# Patient Record
Sex: FEMALE | Race: OTHER | URBAN - METROPOLITAN AREA
[De-identification: names, ages, dates, MRNs, and addresses within clinical notes are randomized per-mention and may not be internally consistent; named-entity substitution may affect disease eponyms.]

---

## 2018-11-02 ENCOUNTER — EMERGENCY (EMERGENCY)
Facility: HOSPITAL | Age: 83
LOS: 1 days | Discharge: OTHER INSTITUTION | End: 2018-11-02
Attending: EMERGENCY MEDICINE
Payer: SELF-PAY

## 2018-11-02 ENCOUNTER — INPATIENT (INPATIENT)
Facility: HOSPITAL | Age: 83
LOS: 12 days | Discharge: HOME | DRG: 64 | End: 2018-11-15
Attending: PSYCHIATRY & NEUROLOGY | Admitting: PSYCHIATRY & NEUROLOGY
Payer: MEDICAID

## 2018-11-02 VITALS
WEIGHT: 135.58 LBS | TEMPERATURE: 98 F | RESPIRATION RATE: 18 BRPM | OXYGEN SATURATION: 100 % | SYSTOLIC BLOOD PRESSURE: 122 MMHG | DIASTOLIC BLOOD PRESSURE: 65 MMHG | HEART RATE: 70 BPM

## 2018-11-02 VITALS
RESPIRATION RATE: 16 BRPM | SYSTOLIC BLOOD PRESSURE: 125 MMHG | DIASTOLIC BLOOD PRESSURE: 55 MMHG | HEART RATE: 65 BPM | OXYGEN SATURATION: 97 %

## 2018-11-02 VITALS
OXYGEN SATURATION: 100 % | TEMPERATURE: 96 F | HEART RATE: 74 BPM | SYSTOLIC BLOOD PRESSURE: 147 MMHG | DIASTOLIC BLOOD PRESSURE: 76 MMHG

## 2018-11-02 DIAGNOSIS — I63.412 CEREBRAL INFARCTION DUE TO EMBOLISM OF LEFT MIDDLE CEREBRAL ARTERY: ICD-10-CM

## 2018-11-02 DIAGNOSIS — Z90.49 ACQUIRED ABSENCE OF OTHER SPECIFIED PARTS OF DIGESTIVE TRACT: Chronic | ICD-10-CM

## 2018-11-02 DIAGNOSIS — I63.9 CEREBRAL INFARCTION, UNSPECIFIED: ICD-10-CM

## 2018-11-02 LAB
ALBUMIN SERPL ELPH-MCNC: 3.8 G/DL — SIGNIFICANT CHANGE UP (ref 3.3–5)
ALBUMIN SERPL ELPH-MCNC: 4.1 G/DL — SIGNIFICANT CHANGE UP (ref 3.3–5.2)
ALP SERPL-CCNC: 87 U/L — SIGNIFICANT CHANGE UP (ref 40–120)
ALP SERPL-CCNC: 88 U/L — SIGNIFICANT CHANGE UP (ref 40–120)
ALT FLD-CCNC: 18 U/L — SIGNIFICANT CHANGE UP
ALT FLD-CCNC: 18 U/L — SIGNIFICANT CHANGE UP (ref 10–45)
ANION GAP SERPL CALC-SCNC: 10 MMOL/L — SIGNIFICANT CHANGE UP (ref 5–17)
ANION GAP SERPL CALC-SCNC: 13 MMOL/L — SIGNIFICANT CHANGE UP (ref 5–17)
APTT BLD: 19.5 SEC — LOW (ref 27.5–36.3)
APTT BLD: 25.7 SEC — LOW (ref 27.5–36.3)
AST SERPL-CCNC: 27 U/L — SIGNIFICANT CHANGE UP
AST SERPL-CCNC: 27 U/L — SIGNIFICANT CHANGE UP (ref 10–40)
BASOPHILS # BLD AUTO: 0 K/UL — SIGNIFICANT CHANGE UP (ref 0–0.2)
BASOPHILS # BLD AUTO: 0 K/UL — SIGNIFICANT CHANGE UP (ref 0–0.2)
BASOPHILS NFR BLD AUTO: 0.3 % — SIGNIFICANT CHANGE UP (ref 0–2)
BASOPHILS NFR BLD AUTO: 0.4 % — SIGNIFICANT CHANGE UP (ref 0–2)
BILIRUB SERPL-MCNC: 0.4 MG/DL — SIGNIFICANT CHANGE UP (ref 0.2–1.2)
BILIRUB SERPL-MCNC: 0.4 MG/DL — SIGNIFICANT CHANGE UP (ref 0.4–2)
BLD GP AB SCN SERPL QL: NEGATIVE — SIGNIFICANT CHANGE UP
BUN SERPL-MCNC: 26 MG/DL — HIGH (ref 7–23)
BUN SERPL-MCNC: 29 MG/DL — HIGH (ref 8–20)
CALCIUM SERPL-MCNC: 10.3 MG/DL — HIGH (ref 8.6–10.2)
CALCIUM SERPL-MCNC: 9.8 MG/DL — SIGNIFICANT CHANGE UP (ref 8.4–10.5)
CHLORIDE SERPL-SCNC: 104 MMOL/L — SIGNIFICANT CHANGE UP (ref 96–108)
CHLORIDE SERPL-SCNC: 107 MMOL/L — SIGNIFICANT CHANGE UP (ref 98–107)
CO2 SERPL-SCNC: 21 MMOL/L — LOW (ref 22–29)
CO2 SERPL-SCNC: 21 MMOL/L — LOW (ref 22–31)
CREAT SERPL-MCNC: 0.78 MG/DL — SIGNIFICANT CHANGE UP (ref 0.5–1.3)
CREAT SERPL-MCNC: 0.78 MG/DL — SIGNIFICANT CHANGE UP (ref 0.5–1.3)
EOSINOPHIL # BLD AUTO: 0 K/UL — SIGNIFICANT CHANGE UP (ref 0–0.5)
EOSINOPHIL # BLD AUTO: 0.1 K/UL — SIGNIFICANT CHANGE UP (ref 0–0.5)
EOSINOPHIL NFR BLD AUTO: 0.2 % — SIGNIFICANT CHANGE UP (ref 0–6)
EOSINOPHIL NFR BLD AUTO: 1.2 % — SIGNIFICANT CHANGE UP (ref 0–6)
GLUCOSE SERPL-MCNC: 103 MG/DL — HIGH (ref 70–99)
GLUCOSE SERPL-MCNC: 110 MG/DL — SIGNIFICANT CHANGE UP (ref 70–115)
HCT VFR BLD CALC: 35.2 % — SIGNIFICANT CHANGE UP (ref 34.5–45)
HCT VFR BLD CALC: 36 % — LOW (ref 37–47)
HGB BLD-MCNC: 11.7 G/DL — SIGNIFICANT CHANGE UP (ref 11.5–15.5)
HGB BLD-MCNC: 11.8 G/DL — LOW (ref 12–16)
INR BLD: 0.99 RATIO — SIGNIFICANT CHANGE UP (ref 0.88–1.16)
INR BLD: 1.02 RATIO — SIGNIFICANT CHANGE UP (ref 0.88–1.16)
LYMPHOCYTES # BLD AUTO: 1 K/UL — SIGNIFICANT CHANGE UP (ref 1–3.3)
LYMPHOCYTES # BLD AUTO: 3.1 K/UL — SIGNIFICANT CHANGE UP (ref 1–4.8)
LYMPHOCYTES # BLD AUTO: 43.4 % — SIGNIFICANT CHANGE UP (ref 20–55)
LYMPHOCYTES # BLD AUTO: 9.3 % — LOW (ref 13–44)
MCHC RBC-ENTMCNC: 32.2 PG — HIGH (ref 27–31)
MCHC RBC-ENTMCNC: 32.8 G/DL — SIGNIFICANT CHANGE UP (ref 32–36)
MCHC RBC-ENTMCNC: 32.9 PG — SIGNIFICANT CHANGE UP (ref 27–34)
MCHC RBC-ENTMCNC: 33.4 GM/DL — SIGNIFICANT CHANGE UP (ref 32–36)
MCV RBC AUTO: 98.4 FL — SIGNIFICANT CHANGE UP (ref 81–99)
MCV RBC AUTO: 98.6 FL — SIGNIFICANT CHANGE UP (ref 80–100)
MONOCYTES # BLD AUTO: 0.6 K/UL — SIGNIFICANT CHANGE UP (ref 0–0.9)
MONOCYTES # BLD AUTO: 0.7 K/UL — SIGNIFICANT CHANGE UP (ref 0–0.8)
MONOCYTES NFR BLD AUTO: 4.9 % — SIGNIFICANT CHANGE UP (ref 2–14)
MONOCYTES NFR BLD AUTO: 9.3 % — SIGNIFICANT CHANGE UP (ref 3–10)
NEUTROPHILS # BLD AUTO: 3.3 K/UL — SIGNIFICANT CHANGE UP (ref 1.8–8)
NEUTROPHILS # BLD AUTO: 9.6 K/UL — HIGH (ref 1.8–7.4)
NEUTROPHILS NFR BLD AUTO: 45.7 % — SIGNIFICANT CHANGE UP (ref 37–73)
NEUTROPHILS NFR BLD AUTO: 85.2 % — HIGH (ref 43–77)
PLATELET # BLD AUTO: 153 K/UL — SIGNIFICANT CHANGE UP (ref 150–400)
PLATELET # BLD AUTO: 191 K/UL — SIGNIFICANT CHANGE UP (ref 150–400)
POTASSIUM SERPL-MCNC: 4.2 MMOL/L — SIGNIFICANT CHANGE UP (ref 3.5–5.3)
POTASSIUM SERPL-MCNC: 4.5 MMOL/L — SIGNIFICANT CHANGE UP (ref 3.5–5.3)
POTASSIUM SERPL-SCNC: 4.2 MMOL/L — SIGNIFICANT CHANGE UP (ref 3.5–5.3)
POTASSIUM SERPL-SCNC: 4.5 MMOL/L — SIGNIFICANT CHANGE UP (ref 3.5–5.3)
PROT SERPL-MCNC: 6.4 G/DL — SIGNIFICANT CHANGE UP (ref 6–8.3)
PROT SERPL-MCNC: 6.7 G/DL — SIGNIFICANT CHANGE UP (ref 6.6–8.7)
PROTHROM AB SERPL-ACNC: 11.4 SEC — SIGNIFICANT CHANGE UP (ref 10–12.9)
PROTHROM AB SERPL-ACNC: 11.7 SEC — SIGNIFICANT CHANGE UP (ref 10–12.9)
RBC # BLD: 3.57 M/UL — LOW (ref 3.8–5.2)
RBC # BLD: 3.66 M/UL — LOW (ref 4.4–5.2)
RBC # FLD: 12.3 % — SIGNIFICANT CHANGE UP (ref 10.3–14.5)
RBC # FLD: 12.8 % — SIGNIFICANT CHANGE UP (ref 11–15.6)
RH IG SCN BLD-IMP: POSITIVE — SIGNIFICANT CHANGE UP
SODIUM SERPL-SCNC: 135 MMOL/L — SIGNIFICANT CHANGE UP (ref 135–145)
SODIUM SERPL-SCNC: 141 MMOL/L — SIGNIFICANT CHANGE UP (ref 135–145)
TROPONIN T SERPL-MCNC: <0.01 NG/ML — SIGNIFICANT CHANGE UP (ref 0–0.06)
WBC # BLD: 11.2 K/UL — HIGH (ref 3.8–10.5)
WBC # BLD: 7.2 K/UL — SIGNIFICANT CHANGE UP (ref 4.8–10.8)
WBC # FLD AUTO: 11.2 K/UL — HIGH (ref 3.8–10.5)
WBC # FLD AUTO: 7.2 K/UL — SIGNIFICANT CHANGE UP (ref 4.8–10.8)

## 2018-11-02 PROCEDURE — 93010 ELECTROCARDIOGRAM REPORT: CPT

## 2018-11-02 PROCEDURE — 70450 CT HEAD/BRAIN W/O DYE: CPT | Mod: 26,59,77

## 2018-11-02 PROCEDURE — T1013: CPT

## 2018-11-02 PROCEDURE — 71045 X-RAY EXAM CHEST 1 VIEW: CPT

## 2018-11-02 PROCEDURE — 93005 ELECTROCARDIOGRAM TRACING: CPT

## 2018-11-02 PROCEDURE — 85610 PROTHROMBIN TIME: CPT

## 2018-11-02 PROCEDURE — 71045 X-RAY EXAM CHEST 1 VIEW: CPT | Mod: 26

## 2018-11-02 PROCEDURE — 85027 COMPLETE CBC AUTOMATED: CPT

## 2018-11-02 PROCEDURE — 82962 GLUCOSE BLOOD TEST: CPT

## 2018-11-02 PROCEDURE — 99291 CRITICAL CARE FIRST HOUR: CPT | Mod: 25

## 2018-11-02 PROCEDURE — 84484 ASSAY OF TROPONIN QUANT: CPT

## 2018-11-02 PROCEDURE — 37195 THROMBOLYTIC THERAPY STROKE: CPT

## 2018-11-02 PROCEDURE — 70498 CT ANGIOGRAPHY NECK: CPT | Mod: 26

## 2018-11-02 PROCEDURE — 70450 CT HEAD/BRAIN W/O DYE: CPT | Mod: 26

## 2018-11-02 PROCEDURE — 99291 CRITICAL CARE FIRST HOUR: CPT

## 2018-11-02 PROCEDURE — 80053 COMPREHEN METABOLIC PANEL: CPT

## 2018-11-02 PROCEDURE — 36415 COLL VENOUS BLD VENIPUNCTURE: CPT

## 2018-11-02 PROCEDURE — 70496 CT ANGIOGRAPHY HEAD: CPT | Mod: 26

## 2018-11-02 PROCEDURE — 85730 THROMBOPLASTIN TIME PARTIAL: CPT

## 2018-11-02 PROCEDURE — 70450 CT HEAD/BRAIN W/O DYE: CPT

## 2018-11-02 PROCEDURE — 0042T: CPT

## 2018-11-02 RX ORDER — ALTEPLASE 100 MG
6 KIT INTRAVENOUS ONCE
Qty: 0 | Refills: 0 | Status: COMPLETED | OUTPATIENT
Start: 2018-11-02 | End: 2018-11-02

## 2018-11-02 RX ORDER — SODIUM CHLORIDE 9 MG/ML
1000 INJECTION INTRAMUSCULAR; INTRAVENOUS; SUBCUTANEOUS
Qty: 0 | Refills: 0 | Status: DISCONTINUED | OUTPATIENT
Start: 2018-11-02 | End: 2018-11-07

## 2018-11-02 RX ORDER — ALTEPLASE 100 MG
50 KIT INTRAVENOUS ONCE
Qty: 0 | Refills: 0 | Status: COMPLETED | OUTPATIENT
Start: 2018-11-02 | End: 2018-11-02

## 2018-11-02 RX ORDER — TELMISARTAN 20 MG/1
0 TABLET ORAL
Qty: 0 | Refills: 0 | COMMUNITY

## 2018-11-02 RX ORDER — SODIUM CHLORIDE 9 MG/ML
3 INJECTION INTRAMUSCULAR; INTRAVENOUS; SUBCUTANEOUS ONCE
Qty: 0 | Refills: 0 | Status: COMPLETED | OUTPATIENT
Start: 2018-11-02 | End: 2018-11-02

## 2018-11-02 RX ORDER — INFLUENZA VIRUS VACCINE 15; 15; 15; 15 UG/.5ML; UG/.5ML; UG/.5ML; UG/.5ML
0.5 SUSPENSION INTRAMUSCULAR ONCE
Qty: 0 | Refills: 0 | Status: DISCONTINUED | OUTPATIENT
Start: 2018-11-02 | End: 2018-11-15

## 2018-11-02 RX ADMIN — SODIUM CHLORIDE 3 MILLILITER(S): 9 INJECTION INTRAMUSCULAR; INTRAVENOUS; SUBCUTANEOUS at 18:48

## 2018-11-02 RX ADMIN — SODIUM CHLORIDE 125 MILLILITER(S): 9 INJECTION INTRAMUSCULAR; INTRAVENOUS; SUBCUTANEOUS at 19:02

## 2018-11-02 RX ADMIN — SODIUM CHLORIDE 75 MILLILITER(S): 9 INJECTION INTRAMUSCULAR; INTRAVENOUS; SUBCUTANEOUS at 23:10

## 2018-11-02 RX ADMIN — ALTEPLASE 360 MILLIGRAM(S): KIT at 18:59

## 2018-11-02 RX ADMIN — ALTEPLASE 6 MILLIGRAM(S): KIT at 19:00

## 2018-11-02 RX ADMIN — ALTEPLASE 50 MILLIGRAM(S): KIT at 19:01

## 2018-11-02 NOTE — ED PROVIDER NOTE - OBJECTIVE STATEMENT
87yo female pmh htn comes to ed last known normal 1745 when patient was drinking tea when she suddenly dropped cup and ? syncopal episode; as per ems, pt not responding and noted with weakness of rt upper extremity;   in ed; pt aphasic with left gax and paralysis or rt upper and lower extremity; code stroke called; blood glucose 131 by ems

## 2018-11-02 NOTE — ED ADULT NURSE NOTE - OBJECTIVE STATEMENT
86 y.o F presents to the ED via EMS as a transfer from [facility] s/p stroke. As per EMS, the patient was last seen normal at 1735 this evening; patient was brought to [facility] for aphasia and R facial droop and R sided weakness. As per EMS, TPA was initiated at [facility]- infusion started [id number] and infusion was completed at 1937. Patient transferred after infusion was complete; Patient arrived [id number] and Code Stroke called- Post TPA vitals resumed and patient sent directly to CT; labs re-drawn. Patient currently non-verbal and not answering questions or following commands. Patient still has R sided facial droop and right sided weakness. + perrl 3mm. Patient speaks Zimbabwean, Grandson, Parviz at the bedside to translate.

## 2018-11-02 NOTE — PHARMACOTHERAPY INTERVENTION NOTE - COMMENTS
Spoke to granddaughter at bedside. Pt is not on any anticoagulant or antiplatelets.
Code attended: Stroke  tPA administered

## 2018-11-02 NOTE — STROKE CODE NOTE - ASSESSMENT/PLAN
86 years old woman with vascular risk factors of age and hypertension is evaluated at OSH for acute onset of right-sided weakness and language disturbance, last known well time being 5:00-5:30 PM. Neurological examination shows decreased level of consciousness, right hemiplegia, right gaze palsy, right homonomous hemianopsia by blink to threat, global aphasia, and being mute. CT brain did not show any evidence of acute infarct or hemorrhage but was concerning for "dot sign" in the sylvian fissure.     Impression:  Cerebral embolism with cerebral infarction. Left MCA distribution stroke - likely etiology being embolic stroke from undetermined source versus large vessel disease

## 2018-11-02 NOTE — H&P ADULT - ASSESSMENT
87 yo right-handed Italian speaking woman who presents to [facility] as a transfer from [facility] for new and acute right-sided weakness and language disturbance (LWK 5-530pm, 11/2/18). ROS also revealed non-verbal, unresponsiveness. Otherwise limited due to patient's current cognitive state. Pertinent hx includes HTN and "heart condition" (unspecified w/ family at bedside). Patient's initial NIHSS score worsened to 28 upon arrival to [facility], was given tPA (1859pm, 11/2/18) and subsequently transferred for possible endovascular intervention. Patient's current NIHSS 22, pre-MRS 0, ASPECT > 6. 87 yo right-handed Thai speaking woman who presents to [facility] as a transfer from [facility] for new and acute right-sided weakness and language disturbance (LWK 5-530pm, 11/2/18). ROS also revealed non-verbal, unresponsiveness. Otherwise limited due to patient's current cognitive state. Pertinent hx includes HTN and "heart condition" (unspecified w/ family at bedside). Patient's initial NIHSS score worsened to 28 upon arrival to [facility], was given tPA (1859pm, 11/2/18) and subsequently transferred for possible endovascular intervention. Patient's current NIHSS 22, pre-MRS 0, no M1 occlusion.

## 2018-11-02 NOTE — ED ADULT NURSE NOTE - INTERVENTIONS DEFINITIONS
[place] to call system/Room bathroom lighting operational/Call bell, personal items and telephone within reach/Instruct patient to call for assistance

## 2018-11-02 NOTE — ED ADULT NURSE NOTE - OBJECTIVE STATEMENT
Pt BIBA nonverbal, s/p witnessed syncopal episode at home.  As per daughter, pt is normally a/ox3.  Pt presents only responsive to pain, less movement to right side.  Left sided gaze.  Unable to follow commmands.  Last seen normal at 1745 today.  Respirations even and unlabored.  Skin warm and dry.

## 2018-11-02 NOTE — ED PROVIDER NOTE - OBJECTIVE STATEMENT
87 y/o female hx of HTN presents as transfer from [facility] as stroke rescue. Bengali speaking 87 y/o female hx of HTN presents as transfer from [place] as stroke rescue. Wolof speaking patient w/ family at the bedside. Per granddaughter, patient was drinking tea at 5:30 pm when she dropped her tea out of R hand, became non verbal. NIH stroke scale initially 25 per documentation. Given TPA and driven here via EMS. Patient now moving her R leg, family states she was not doing that. Patient still non verbal w/ R sided neglect and L gaze preference. Mild increased movement of R hand. Not on any AC.

## 2018-11-02 NOTE — H&P ADULT - CRANIAL NERVE
left gaze preference not overcome w/ oculocephalics, PERRLA, right facial droop (mild), left blink reflex only,

## 2018-11-02 NOTE — ED PROVIDER NOTE - PROGRESS NOTE DETAILS
pt to have emergent ct angio of head for work up of stroke; overrriding need for renal function for test due to life threatening illness; ; pt to be transferred to [facility]; for further evaluation;

## 2018-11-02 NOTE — H&P ADULT - ATTENDING COMMENTS
I have seen and examined this patient with the stroke neurology team.     History was reviewed with the patient and/or available family members.   ROS: All negative except documented in the HPI.   Neurological exam was performed and agree with exam as documented above.   Laboratory results and imaging studies were reviewed by me.   I agree with the neurology resident note as documented above.    86 years old woman with multiple vascular risk factors, including age and HTN was transferred from [facility] to [facility] for acute onset of right-sided weakness. She was reported to be last seen to be normal, walking and talking, without any difficulties at around 5:00 to 5:30 PM. Soon after that. She was noted to have developed acute onset of right-sided weakness and language disturbance. She was initially evaluated at [facility] through tele-stroke and was treated with IV tPA. She was subsequently transferred to [facility] for further management. CT brain on arrival showed early changes of ischemia in the left MCA and CYNDEE distribution. CTA head and neck to my eye showed left MCA M3 (likely angular division) and left CYNDEE (A2/A3) pericallosal branch occlusions. CT perfusion showed cord infarct being 26 mL and minimally hypoperfused brain tissue being 47 mL. Neurological examination today shows a right gaze palsy, UMN type, right facial droop, right hemiplegia, global aphasia and being mute.     Impression:  Cerebral embolism with cerebral infarction. Left ICA (MCA+CYNDEE) distribution stroke - likely etiology being cryptogenic stroke, probably related to embolism from a proximal source like cardiac/paradoxical source of embolism    Plan:   - Case discussed with Dr. Barakat; based on discussion - risks for undergoing mechanical embolectomy are thought to outweigh potential benefits considering distal occlusions noted on CTA head and neck and CT perfusion findings thus patient was not deemed to be a candidate for mechanical embolectomy  - Continue with  24 hours post IV tPA precautions including BP goal<180/105 mmHg for first 24 hours followed by gradual normotension as per protocol  - MRI brain without contrast   - Aspirin and pharmacological DVT prophylaxis to be considered at 24 hours after the IV tPA and after repeating brain imaging, SCDs for DVT prophylaxis in the interim  - Atorvastatin 80 mg at bedtime after establishing enteral access or passing swallow evaluation  - TTE with bubble study and continue with telemetry to screen for possible cardiac source of embolism  - Prolonged cardiac monitoring with ICM to screen for occult cardiac arrythmia being the cardiac source of embolism, to be considered based on results of above mentioned tests   - HbA1C and LDL, continue with aggressive vascular risk factors modifications   - PT/OT/Speech and swallow evaluation   - Stroke education    Above mentioned plan was discussed with patient and available family member in detail. All the questions were answered and concerns were addressed. More than 86 minutes were spent in evaluation and management of this critically ill and unstable patient with acute stroke.

## 2018-11-02 NOTE — H&P ADULT - NSHPLABSRESULTS_GEN_ALL_CORE
Labs:    11-02    135  |  104  |  26<H>  ----------------------------<  103<H>  4.5   |  21<L>  |  0.78    Ca    9.8      02 Nov 2018 21:39    TPro  6.4  /  Alb  3.8  /  TBili  0.4  /  DBili  x   /  AST  27  /  ALT  18  /  AlkPhos  87  11-02    LIVER FUNCTIONS - ( 02 Nov 2018 21:39 )  Alb: 3.8 g/dL / Pro: 6.4 g/dL / ALK PHOS: 87 U/L / ALT: 18 U/L / AST: 27 U/L / GGT: x           CBC Full  -  ( 02 Nov 2018 21:39 )  WBC Count : 11.2 K/uL  Hemoglobin : 11.7 g/dL  Hematocrit : 35.2 %  Platelet Count - Automated : 153 K/uL  Mean Cell Volume : 98.6 fl  Mean Cell Hemoglobin : 32.9 pg  Mean Cell Hemoglobin Concentration : 33.4 gm/dL  Auto Neutrophil # : 9.6 K/uL  Auto Lymphocyte # : 1.0 K/uL  Auto Monocyte # : 0.6 K/uL  Auto Eosinophil # : 0.0 K/uL  Auto Basophil # : 0.0 K/uL  Auto Neutrophil % : 85.2 %  Auto Lymphocyte % : 9.3 %  Auto Monocyte % : 4.9 %  Auto Eosinophil % : 0.2 %  Auto Basophil % : 0.4 %    Radiology  < from: CT Head No Cont (11.02.18 @ 21:08) >    IMPRESSION:   Large infarcts in the left anterior and middle cerebral artery   distribution. No acute intracranial hemorrhage, midline shift or mass   effect.     Occlusion of the left internal carotid artery and/or thromboembolic   etiology suspected. Consider echocardiography and carotid Doppler for   further evaluation.    < end of copied text >

## 2018-11-02 NOTE — H&P ADULT - PROBLEM SELECTOR PLAN 1
right hemiparesis w/ global aphasia and right-side neglect 2/2 left multi territorial (distal CYNDEE, distal M2/3) acute ischemic infarcts of unknown etiology. Patient not candidate for endovascular at this time. s/p tPA 11/2/18 (1859)   Plan:   [x]CTH  [x]CTA H/N  [x]CT Perfusion  []MRI brain w/o contrast   []TTE   [x]tele  Meds: [hold 24hours s/p tpa]ASA/DVT prophylaxis [x]statin   Labs: []A1c []LDL []PT/INR   Consults: []SS/PT/OT   Recommendations  -NPO including meds, pending dysphagia screen   -Permissive /105   -hold all blood thinners for 24 hours s/p tPA  -repeat CTH s/p 24hrs tpa  -neuro checks     Med Rec: completed  Dispo:

## 2018-11-02 NOTE — ED PROVIDER NOTE - CRITICAL CARE PROVIDED
direct patient care (not related to procedure)/consultation with other physicians/consult w/ pt's family directly relating to pts condition/documentation

## 2018-11-02 NOTE — ED ADULT NURSE NOTE - NSIMPLEMENTINTERV_GEN_ALL_ED
Implemented All Fall with Harm Risk Interventions:  [place] to call system. Call bell, personal items and telephone within reach. Instruct patient to call for assistance. Room bathroom lighting operational. Non-slip footwear when patient is off stretcher. Physically safe environment: no spills, clutter or unnecessary equipment. Stretcher in lowest position, wheels locked, appropriate side rails in place. Provide visual cue, wrist band, yellow gown, etc. Monitor gait and stability. Monitor for mental status changes and reorient to person, place, and time. Review medications for side effects contributing to fall risk. Reinforce activity limits and safety measures with patient and family. Provide visual clues: red socks.

## 2018-11-02 NOTE — STROKE CODE NOTE - PROBLEM SELECTOR PLAN 1
Plan:   - Risks, benefits and adverse reactions associated with IV tPA including hemorrhagic stroke were discussed with patient and available family member in detail. After ruling out contraindications and obtaining verbal consent, patient would be treated with IV tPA in the ED  - Cont with IV tPA precautions including BP goal<185/110 mmHg before the bolus and <180/105 mmHg for first 24 hours after bolus followed by gradual normotension  - Transfer to [facility] for evaluation for consideration of neuro-interventional surgery   - Aspirin and pharmacological DVT prophylaxis to be considered 24 hours after the IV tPA and repeat brain imaging. SCDs for DVT prophylaxis in the interim   - Atorvastatin 80 mg after establishing enteral access or passing swallow evaluation  - TTE with bubble study and telemetry to screen for possible cardiac source of embolism  - LDL and HbA1C, continue with aggressive vascular risk factors modifications   - PT/OT/Speech and swallow evaluation     Above mentioned plan was discussed with patient, available family member and [facility] ED MD in detail. All the questions were answered and concerns were addressed.

## 2018-11-02 NOTE — ED PROVIDER NOTE - ATTENDING CONTRIBUTION TO CARE
87 y/o f with pmhx HTN, presents by EMS as a stroke transfer from [facility] for further care. patient had onset of symptoms at around 5:45 this evening, weakness noted while she was drinking and taken to hospital.  Per granddaughter, patient was drinking tea at 5:30 pm when she dropped her tea out of R hand, became non verbal. NIH stroke scale initially 25 per documentation. Given TPA at around 6-7 pm and driven here via EMS. Patient now moving her R leg, family states she was not doing that. Patient still non verbal w/ R sided neglect and L gaze preference. Mild increased movement of R hand. Not on any AC. Neurology at bedside upon patient arrival.   Gen. no acute distress   HEENT: pupils 2 mm b/l and reactive, left lateral gaze pref.   Lungs:  b/l bs  CVS: S1S2   Abd;  soft non tender  Ext: no edema no erythema  Neuro: non verbal. limited exam patient not following commands. speech not comprehensible  MSK: right side upper 0/5 right leg 2/5, left moving spon.

## 2018-11-02 NOTE — PATIENT PROFILE ADULT - STATED REASON FOR ADMISSION
pt was having dinner with family and became unresponsive , was take by ambulance to [facility]

## 2018-11-02 NOTE — ED ADULT NURSE REASSESSMENT NOTE - NS ED NURSE REASSESS COMMENT FT1
IV to left forearm appearing to be mildly infiltrated, cool to touch from normal saline running through.  IV stopped at this time.

## 2018-11-02 NOTE — ED PROVIDER NOTE - MEDICAL DECISION MAKING DETAILS
Connor Herrera (Resident): 87 y/o HTN w/ stroke rescue from OSH - mild increase in movement of her R leg. still no movement of R arm - will monitor BP, CT Head and neck, and admit to Stroke unit

## 2018-11-02 NOTE — STROKE CODE NOTE - SUBJECTIVE
86 years old woman was reported to be last normal at around 5:00-5:30 PM. Soon after that she sat down to drink tea. She developed acute onset of right sided weakness resulting in dropping of the cup with the right hand. She was also noted to have language disturbance as she was "unresponsive" to family members. She was brought to OSH for further evaluation.

## 2018-11-03 LAB
ANION GAP SERPL CALC-SCNC: 10 MMOL/L — SIGNIFICANT CHANGE UP (ref 5–17)
BASOPHILS # BLD AUTO: 0 K/UL — SIGNIFICANT CHANGE UP (ref 0–0.2)
BASOPHILS NFR BLD AUTO: 0.1 % — SIGNIFICANT CHANGE UP (ref 0–2)
BUN SERPL-MCNC: 22 MG/DL — SIGNIFICANT CHANGE UP (ref 7–23)
CALCIUM SERPL-MCNC: 9.7 MG/DL — SIGNIFICANT CHANGE UP (ref 8.4–10.5)
CHLORIDE SERPL-SCNC: 108 MMOL/L — SIGNIFICANT CHANGE UP (ref 96–108)
CHOLEST SERPL-MCNC: 169 MG/DL — SIGNIFICANT CHANGE UP (ref 10–199)
CO2 SERPL-SCNC: 22 MMOL/L — SIGNIFICANT CHANGE UP (ref 22–31)
CREAT SERPL-MCNC: 0.8 MG/DL — SIGNIFICANT CHANGE UP (ref 0.5–1.3)
EOSINOPHIL # BLD AUTO: 0 K/UL — SIGNIFICANT CHANGE UP (ref 0–0.5)
EOSINOPHIL NFR BLD AUTO: 0.3 % — SIGNIFICANT CHANGE UP (ref 0–6)
GLUCOSE SERPL-MCNC: 157 MG/DL — HIGH (ref 70–99)
HBA1C BLD-MCNC: 5.4 % — SIGNIFICANT CHANGE UP (ref 4–5.6)
HCT VFR BLD CALC: 33.3 % — LOW (ref 34.5–45)
HDLC SERPL-MCNC: 54 MG/DL — SIGNIFICANT CHANGE UP
HGB BLD-MCNC: 11.5 G/DL — SIGNIFICANT CHANGE UP (ref 11.5–15.5)
LIPID PNL WITH DIRECT LDL SERPL: 104 MG/DL — SIGNIFICANT CHANGE UP
LYMPHOCYTES # BLD AUTO: 1.2 K/UL — SIGNIFICANT CHANGE UP (ref 1–3.3)
LYMPHOCYTES # BLD AUTO: 14.6 % — SIGNIFICANT CHANGE UP (ref 13–44)
MCHC RBC-ENTMCNC: 33.7 PG — SIGNIFICANT CHANGE UP (ref 27–34)
MCHC RBC-ENTMCNC: 34.4 GM/DL — SIGNIFICANT CHANGE UP (ref 32–36)
MCV RBC AUTO: 97.8 FL — SIGNIFICANT CHANGE UP (ref 80–100)
MONOCYTES # BLD AUTO: 0.3 K/UL — SIGNIFICANT CHANGE UP (ref 0–0.9)
MONOCYTES NFR BLD AUTO: 4.1 % — SIGNIFICANT CHANGE UP (ref 2–14)
NEUTROPHILS # BLD AUTO: 6.8 K/UL — SIGNIFICANT CHANGE UP (ref 1.8–7.4)
NEUTROPHILS NFR BLD AUTO: 80.9 % — HIGH (ref 43–77)
PLATELET # BLD AUTO: 181 K/UL — SIGNIFICANT CHANGE UP (ref 150–400)
POTASSIUM SERPL-MCNC: 4.2 MMOL/L — SIGNIFICANT CHANGE UP (ref 3.5–5.3)
POTASSIUM SERPL-SCNC: 4.2 MMOL/L — SIGNIFICANT CHANGE UP (ref 3.5–5.3)
RBC # BLD: 3.4 M/UL — LOW (ref 3.8–5.2)
RBC # FLD: 12.2 % — SIGNIFICANT CHANGE UP (ref 10.3–14.5)
SODIUM SERPL-SCNC: 140 MMOL/L — SIGNIFICANT CHANGE UP (ref 135–145)
TOTAL CHOLESTEROL/HDL RATIO MEASUREMENT: 3.1 RATIO — LOW (ref 3.3–7.1)
TRIGL SERPL-MCNC: 57 MG/DL — SIGNIFICANT CHANGE UP (ref 10–149)
WBC # BLD: 8.4 K/UL — SIGNIFICANT CHANGE UP (ref 3.8–10.5)
WBC # FLD AUTO: 8.4 K/UL — SIGNIFICANT CHANGE UP (ref 3.8–10.5)

## 2018-11-03 PROCEDURE — 70551 MRI BRAIN STEM W/O DYE: CPT | Mod: 26

## 2018-11-03 PROCEDURE — 99291 CRITICAL CARE FIRST HOUR: CPT

## 2018-11-03 PROCEDURE — 70450 CT HEAD/BRAIN W/O DYE: CPT | Mod: 26

## 2018-11-03 NOTE — PROVIDER CONTACT NOTE (OTHER) - ASSESSMENT
pt globally aphasic, asleep. HR bradicardia low 40's (43 lowest) pt globally aphasic, asleep. HR bradycardia low 40's (43 lowest)

## 2018-11-04 DIAGNOSIS — R00.1 BRADYCARDIA, UNSPECIFIED: ICD-10-CM

## 2018-11-04 DIAGNOSIS — I63.9 CEREBRAL INFARCTION, UNSPECIFIED: ICD-10-CM

## 2018-11-04 LAB
ANION GAP SERPL CALC-SCNC: 10 MMOL/L — SIGNIFICANT CHANGE UP (ref 5–17)
BASOPHILS # BLD AUTO: 0 K/UL — SIGNIFICANT CHANGE UP (ref 0–0.2)
BASOPHILS NFR BLD AUTO: 0.6 % — SIGNIFICANT CHANGE UP (ref 0–2)
BUN SERPL-MCNC: 13 MG/DL — SIGNIFICANT CHANGE UP (ref 7–23)
CALCIUM SERPL-MCNC: 9.3 MG/DL — SIGNIFICANT CHANGE UP (ref 8.4–10.5)
CHLORIDE SERPL-SCNC: 113 MMOL/L — HIGH (ref 96–108)
CO2 SERPL-SCNC: 20 MMOL/L — LOW (ref 22–31)
CREAT SERPL-MCNC: 0.74 MG/DL — SIGNIFICANT CHANGE UP (ref 0.5–1.3)
EOSINOPHIL # BLD AUTO: 0.1 K/UL — SIGNIFICANT CHANGE UP (ref 0–0.5)
EOSINOPHIL NFR BLD AUTO: 1.4 % — SIGNIFICANT CHANGE UP (ref 0–6)
GLUCOSE SERPL-MCNC: 84 MG/DL — SIGNIFICANT CHANGE UP (ref 70–99)
HCT VFR BLD CALC: 32 % — LOW (ref 34.5–45)
HGB BLD-MCNC: 11.1 G/DL — LOW (ref 11.5–15.5)
LYMPHOCYTES # BLD AUTO: 1.4 K/UL — SIGNIFICANT CHANGE UP (ref 1–3.3)
LYMPHOCYTES # BLD AUTO: 26.6 % — SIGNIFICANT CHANGE UP (ref 13–44)
MCHC RBC-ENTMCNC: 34.4 PG — HIGH (ref 27–34)
MCHC RBC-ENTMCNC: 34.8 GM/DL — SIGNIFICANT CHANGE UP (ref 32–36)
MCV RBC AUTO: 98.9 FL — SIGNIFICANT CHANGE UP (ref 80–100)
MONOCYTES # BLD AUTO: 0.5 K/UL — SIGNIFICANT CHANGE UP (ref 0–0.9)
MONOCYTES NFR BLD AUTO: 10.3 % — SIGNIFICANT CHANGE UP (ref 2–14)
NEUTROPHILS # BLD AUTO: 3.3 K/UL — SIGNIFICANT CHANGE UP (ref 1.8–7.4)
NEUTROPHILS NFR BLD AUTO: 61.1 % — SIGNIFICANT CHANGE UP (ref 43–77)
PLATELET # BLD AUTO: 162 K/UL — SIGNIFICANT CHANGE UP (ref 150–400)
POTASSIUM SERPL-MCNC: 3.7 MMOL/L — SIGNIFICANT CHANGE UP (ref 3.5–5.3)
POTASSIUM SERPL-SCNC: 3.7 MMOL/L — SIGNIFICANT CHANGE UP (ref 3.5–5.3)
RBC # BLD: 3.24 M/UL — LOW (ref 3.8–5.2)
RBC # FLD: 11.9 % — SIGNIFICANT CHANGE UP (ref 10.3–14.5)
SODIUM SERPL-SCNC: 143 MMOL/L — SIGNIFICANT CHANGE UP (ref 135–145)
WBC # BLD: 5.3 K/UL — SIGNIFICANT CHANGE UP (ref 3.8–10.5)
WBC # FLD AUTO: 5.3 K/UL — SIGNIFICANT CHANGE UP (ref 3.8–10.5)

## 2018-11-04 PROCEDURE — 99233 SBSQ HOSP IP/OBS HIGH 50: CPT

## 2018-11-04 RX ORDER — ENOXAPARIN SODIUM 100 MG/ML
40 INJECTION SUBCUTANEOUS DAILY
Qty: 0 | Refills: 0 | Status: DISCONTINUED | OUTPATIENT
Start: 2018-11-04 | End: 2018-11-15

## 2018-11-04 RX ORDER — ASPIRIN/CALCIUM CARB/MAGNESIUM 324 MG
300 TABLET ORAL DAILY
Qty: 0 | Refills: 0 | Status: DISCONTINUED | OUTPATIENT
Start: 2018-11-04 | End: 2018-11-05

## 2018-11-04 RX ADMIN — Medication 300 MILLIGRAM(S): at 11:15

## 2018-11-04 RX ADMIN — SODIUM CHLORIDE 75 MILLILITER(S): 9 INJECTION INTRAMUSCULAR; INTRAVENOUS; SUBCUTANEOUS at 13:03

## 2018-11-04 RX ADMIN — ENOXAPARIN SODIUM 40 MILLIGRAM(S): 100 INJECTION SUBCUTANEOUS at 11:15

## 2018-11-04 NOTE — PHYSICAL THERAPY INITIAL EVALUATION ADULT - ADDITIONAL COMMENTS
Per chart, pt lives in 2 story home with grandkids. Prior to admission, pt was independent without device.

## 2018-11-04 NOTE — SWALLOW BEDSIDE ASSESSMENT ADULT - SLP PERTINENT HISTORY OF CURRENT PROBLEM
85 yo right-handed Upper sorbian speaking woman who presents to [facility] as a transfer from [facility] for new and acute right-sided weakness and language disturbance (LWK 5-530pm, 11/2/18). ROS also revealed non-verbal, unresponsiveness. Otherwise limited due to patient's current cognitive state. Pertinent hx includes HTN and "heart condition" (unspecified w/ family at bedside). Patient's initial NIHSS score worsened to 28 upon arrival to [facility], was given tPA (1859pm, 11/2/18) and subsequently transferred for possible endovascular intervention. Patient's current NIHSS 22, pre-MRS 0, no M1 occlusion.

## 2018-11-04 NOTE — PHYSICAL THERAPY INITIAL EVALUATION ADULT - IMPAIRMENTS CONTRIBUTING IMPAIRED BED MOBILITY, REHAB EVAL
impaired postural control/impaired coordination/decreased flexibility/abnormal muscle tone/impaired balance/impaired motor control/decreased strength

## 2018-11-04 NOTE — SWALLOW BEDSIDE ASSESSMENT ADULT - ASR SWALLOW DENTITION
lower dentures/upper dentures lower dentures/upper dentures/moving in oral cavity->per family pt uses adhesive. Advised family to bring in adhesive or remove dentures for comfort

## 2018-11-04 NOTE — SWALLOW BEDSIDE ASSESSMENT ADULT - COMMENTS
NEUROLOGICAL EXAM: Mental status: Awake, alert, mute, globally aphasic, unable to follow commands, unable to mimic Cranial Nerves: right facial droop,  right gaze palsy, Motor exam: Right side hemiplegia RUE flaccid 0/5, RLE increased tone 0/5, triple flexion, LUE 5/ Per attending statement: he was initially evaluated at [facility] through tele-stroke and was treated with IV tPA. She was subsequently transferred to [facility] for further management. CT brain on arrival showed early changes of ischemia in the left MCA and CYNDEE distribution. CTA head and neck to my eye showed left MCA M3 (likely angular division) and left CYNDEE (A2/A3) pericallosal branch occlusions. CT perfusion showed cord infarct being 26 mL and minimally hypoperfused brain tissue being 47 mL. Case discussed with Dr. Barakat; based on discussion - risks for undergoing mechanical embolectomy are thought to outweigh potential benefits considering distal occlusions noted on CTA head and neck and CT perfusion findings thus patient was not deemed to be a candidate for mechanical embolectomy. Impression: Cerebral embolism with cerebral infarction. Left ICA (MCA+CYNDEE) distribution stroke - likely etiology being cryptogenic stroke, probably related to embolism from a proximal source like cardiac/paradoxical source of embolism. Cards on board. Failed dysphagia screen Mental status: Awake, alert, mute, globally aphasic, unable to follow commands, unable to mimic Cranial Nerves: right facial droop,  right gaze palsy, Motor exam: Right side hemiplegia Per attending statement: she was initially evaluated at [facility] through tele-stroke and was treated with IV tPA. She was subsequently transferred to [facility] for further management. CT brain on arrival showed early changes of ischemia in the left MCA and CYNDEE distribution. CTA head and neck to my eye showed left MCA M3 (likely angular division) and left CYNDEE (A2/A3) pericallosal branch occlusions. CT perfusion showed cord infarct being 26 mL and minimally hypoperfused brain tissue being 47 mL. Case discussed with Dr. Barakat; based on discussion risks for undergoing mechanical embolectomy are thought to outweigh potential benefits considering distal occlusions noted on CTA head and neck and CT perfusion findings thus patient was not deemed to be a candidate for mechanical embolectomy. Impression: Cerebral embolism with cerebral infarction. Left ICA (MCA+CYNDEE) distribution stroke, likely etiology being cryptogenic stroke, probably related to embolism from a proximal source like cardiac/paradoxical source of embolism. Cards on board. Failed dysphagia screen. 11/2 CT: Large infarcts in the left anterior and middle cerebral artery distribution. No acute intracranial hemorrhage, midline shift or mass effect. Occlusion of the left internal carotid artery and/or thromboembolic etiology suspected. Consider echocardiography and carotid Doppler for further evaluation. 11/3 Prelim MRI: eft CYNDEE and left MCA territory infarcts with small amount of cortical hemorrhage in the left MCA infarct. CT:Atrophy. 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 CTA demonstrates decreased number of left middle cerebral artery branches in the sylvian fissure compared with the right consistent with a distal branch occlusion. There is a left distal A2 occlusion.

## 2018-11-04 NOTE — SWALLOW BEDSIDE ASSESSMENT ADULT - SLP GENERAL OBSERVATIONS
Pt supine in bed, non-verbal, reportedly following commands; however, independently able to follow 1/10 commands ("close your eyes") relayed in [place]ic on my assessment. Pt supine in bed, Czech speaking, family preferred to serve as  for assessment, non-verbal and not attempting to communicate, reportedly following commands; however, independently able to follow 1/10 commands ("close your eyes") relayed in Czech on my assessment. Unable to assess V1-V3.

## 2018-11-04 NOTE — CONSULT NOTE ADULT - SUBJECTIVE AND OBJECTIVE BOX
CHIEF COMPLAINT: CVA / Bradycardia      HISTORY OF PRESENT ILLNESS:  87 yo right-handed Slovenian speaking woman who presents to [facility] as a transfer from [facility] for new and acute right-sided weakness and language disturbance (LWK 5-530pm, 11/2/18). ROS also revealed non-verbal, unresponsiveness. Otherwise limited due to patient's current cognitive state. Pertinent hx includes HTN and "heart condition" (unspecified w/ family at bedside). Patient's initial NIHSS score worsened to 28 upon arrival to [facility], was given tPA (1859pm, 11/2/18) and subsequently transferred for possible endovascular intervention. Patient's current NIHSS 22, pre-MRS 0, no M1 occlusion. (02 Nov 2018 22:01)      PAST MEDICAL & SURGICAL HISTORY:  HTN (hypertension)  S/P appendectomy          MEDICATIONS:              influenza   Vaccine 0.5 milliLiter(s) IntraMuscular once  sodium chloride 0.9%. 1000 milliLiter(s) IV Continuous <Continuous>      FAMILY HISTORY:  No pertinent family history in first degree relatives      SOCIAL HISTORY:    [ ] Non-smoker  [ ] Smoker  [ ] Alcohol    Allergies    No Known Allergies    Intolerances    	    REVIEW OF SYSTEMS:  CONSTITUTIONAL: No fever, weight loss, + fatigue  EYES: No eye pain, visual disturbances, or discharge  ENMT:  No difficulty hearing, tinnitus, vertigo; No sinus or throat pain  NECK: No pain or stiffness  RESPIRATORY: No cough, wheezing, chills or hemoptysis; No Shortness of Breath  CARDIOVASCULAR: No chest pain, palpitations, passing out, dizziness, or leg swelling  GASTROINTESTINAL: No abdominal or epigastric pain. No nausea, vomiting, or hematemesis; No diarrhea or constipation. No melena or hematochezia.  GENITOURINARY: No dysuria, frequency, hematuria, or incontinence  NEUROLOGICAL: + headaches, memory loss, loss of strength, numbness, or tremors  SKIN: No itching, burning, rashes, or lesions   LYMPH Nodes: No enlarged glands  ENDOCRINE: No heat or cold intolerance; No hair loss  MUSCULOSKELETAL: + joint pain or swelling; No muscle, back, or extremity pain  PSYCHIATRIC: No depression, anxiety, mood swings, or difficulty sleeping  HEME/LYMPH: No easy bruising, or bleeding gums  ALLERY AND IMMUNOLOGIC: No hives or eczema	    [ ] All others negative	  [ ] Unable to obtain    PHYSICAL EXAM:  T(C): 36.8 (11-04-18 @ 08:00), Max: 37.8 (11-03-18 @ 22:00)  HR: 59 (11-04-18 @ 10:00) (53 - 63)  BP: 142/54 (11-04-18 @ 10:00) (124/65 - 153/54)  RR: 15 (11-04-18 @ 10:00) (14 - 18)  SpO2: 100% (11-04-18 @ 10:00) (97% - 100%)  Wt(kg): --  I&O's Summary    03 Nov 2018 08:01  -  04 Nov 2018 07:00  --------------------------------------------------------  IN: 1762.5 mL / OUT: 1525 mL / NET: 237.5 mL    04 Nov 2018 07:01  -  04 Nov 2018 11:06  --------------------------------------------------------  IN: 300 mL / OUT: 0 mL / NET: 300 mL        Appearance: NAD aphasic   HEENT:   dry oral mucosa, PERRL, EOMI	  Lymphatic: No lymphadenopathy  Cardiovascular: Normal S1 S2, No JVD, No murmurs, No edema  Respiratory: Lungs clear to auscultation	  Psychiatry: A & O x 0, Mood & affect appropriate  Gastrointestinal:  Soft, Non-tender, + BS	  Skin: No rashes, No ecchymoses, No cyanosis	  Neurologic: aphasic   Extremities: decreased range of motion, No clubbing, cyanosis or edema  Vascular: Peripheral pulses palpable 2+ bilaterally    TELEMETRY: 	  Sinus bradycardia   ECG:  Sinus bradycardia, no acute ischemic stt changes 	  RADIOLOGY: Sinus bradycardia   OTHER: 	  	  LABS:	 	    CARDIAC MARKERS:                                  11.1   5.3   )-----------( 162      ( 04 Nov 2018 03:51 )             32.0     11-04    143  |  113<H>  |  13  ----------------------------<  84  3.7   |  20<L>  |  0.74    Ca    9.3      04 Nov 2018 03:51    TPro  6.4  /  Alb  3.8  /  TBili  0.4  /  DBili  x   /  AST  27  /  ALT  18  /  AlkPhos  87  11-02    proBNP:   Lipid Profile:   HgA1c:   TSH:

## 2018-11-04 NOTE — SWALLOW BEDSIDE ASSESSMENT ADULT - ASR SWALLOW RECOMMEND DIAG
Ordered and scheduled for 11am 11/5 per family request./VFSS/MBS Ordered and scheduled for 11am on 11/5./SS/MBS

## 2018-11-04 NOTE — PROGRESS NOTE ADULT - SUBJECTIVE AND OBJECTIVE BOX
THE PATIENT WAS SEEN AND EXAMINED BY ME WITH THE HOUSESTAFF AND STROKE TEAM DURING MORNING ROUNDS.   HPI:  85 yo right-handed Australian speaking woman who presents to [facility] as a transfer from [facility] for new and acute right-sided weakness and language disturbance (LWK 5-530pm, 11/2/18). ROS also revealed non-verbal, unresponsiveness. Otherwise limited due to patient's current cognitive state. Pertinent hx includes HTN and "heart condition" (unspecified w/ family at bedside). Patient's initial NIHSS score worsened to 28 upon arrival to [facility], was given tPA (1859pm, 11/2/18) and subsequently transferred for possible endovascular intervention. Patient's current NIHSS 22, pre-MRS 0, no M1 occlusion. (02 Nov 2018 22:01)      SUBJECTIVE: No events overnight.  No new neurologic complaints.      influenza   Vaccine 0.5 milliLiter(s) IntraMuscular once  sodium chloride 0.9%. 1000 milliLiter(s) IV Continuous <Continuous>      PHYSICAL EXAM:   Vital Signs Last 24 Hrs  T(C): 36.8 (04 Nov 2018 08:00), Max: 37.8 (03 Nov 2018 22:00)  T(F): 98.2 (04 Nov 2018 08:00), Max: 100 (03 Nov 2018 22:00)  HR: 59 (04 Nov 2018 10:00) (53 - 63)  BP: 142/54 (04 Nov 2018 10:00) (124/65 - 153/54)  BP(mean): 78 (04 Nov 2018 10:00) (78 - 98)  RR: 15 (04 Nov 2018 10:00) (14 - 18)  SpO2: 100% (04 Nov 2018 10:00) (97% - 100%)    General: No acute distress  HEENT: EOM intact, visual fields full  Abdomen: Soft, nontender, nondistended   Extremities: No edema    NEUROLOGICAL EXAM:  Mental status: Awake, alert, mute, globally aphasic, unable to follow commands,  unable to mimic  Cranial Nerves: No facial asymmetry, no nystagmus  Motor exam: Right side hemiplegia RUE flaccid 0/5, RLE increased tone0/5, triple flexion, LUE 5/5, LLE 5/5.  Sensation: grimaces to noxious stimuli  Coordination/ Gait: unable to participate with exam, pt aphasic     LABS:                        11.1   5.3   )-----------( 162      ( 04 Nov 2018 03:51 )             32.0    11-04    143  |  113<H>  |  13  ----------------------------<  84  3.7   |  20<L>  |  0.74    Ca    9.3      04 Nov 2018 03:51    TPro  6.4  /  Alb  3.8  /  TBili  0.4  /  DBili  x   /  AST  27  /  ALT  18  /  AlkPhos  87  11-02  PT/INR - ( 02 Nov 2018 21:39 )   PT: 11.7 sec;   INR: 1.02 ratio         PTT - ( 02 Nov 2018 21:39 )  PTT:19.5 sec  Hemoglobin A1C, Whole Blood: 5.4 % (11-03 @ 05:06)      IMAGING: Reviewed by me. THE PATIENT WAS SEEN AND EXAMINED BY ME WITH THE HOUSESTAFF AND STROKE TEAM DURING MORNING ROUNDS.   HPI:  87 yo right-handed Australian speaking woman who presents to [facility] as a transfer from [facility] for new and acute right-sided weakness and language disturbance (LWK 5-530pm, 11/2/18). ROS also revealed non-verbal, unresponsiveness. Otherwise limited due to patient's current cognitive state. Pertinent hx includes HTN and "heart condition" (unspecified w/ family at bedside). Patient's initial NIHSS score worsened to 28 upon arrival to [facility], was given tPA (1859pm, 11/2/18) and subsequently transferred for possible endovascular intervention. Patient's on arrival to [facility] ER NIHSS 22, pre-MRS 0, no M1 occlusion.     SUBJECTIVE: No events overnight.  No new neurologic complaints.      influenza   Vaccine 0.5 milliLiter(s) IntraMuscular once  sodium chloride 0.9%. 1000 milliLiter(s) IV Continuous <Continuous>      PHYSICAL EXAM:   Vital Signs Last 24 Hrs  T(C): 36.8 (04 Nov 2018 08:00), Max: 37.8 (03 Nov 2018 22:00)  T(F): 98.2 (04 Nov 2018 08:00), Max: 100 (03 Nov 2018 22:00)  HR: 59 (04 Nov 2018 10:00) (53 - 63)  BP: 142/54 (04 Nov 2018 10:00) (124/65 - 153/54)  BP(mean): 78 (04 Nov 2018 10:00) (78 - 98)  RR: 15 (04 Nov 2018 10:00) (14 - 18)  SpO2: 100% (04 Nov 2018 10:00) (97% - 100%)    General: No acute distress  HEENT: EOM intact, visual fields full  Abdomen: Soft, nontender, nondistended   Extremities: No edema    NEUROLOGICAL EXAM:  Mental status: Awake, alert, mute, globally aphasic, unable to follow commands, unable to mimic  Cranial Nerves: No facial asymmetry, no nystagmus  Motor exam: Right side hemiplegia RUE flaccid 0/5, RLE increased tone0/5, triple flexion, LUE 5/5, LLE 5/5.  Sensation: grimaces to noxious stimuli  Coordination/ Gait: unable to participate with exam, pt aphasic     LABS:                        11.1   5.3   )-----------( 162      ( 04 Nov 2018 03:51 )             32.0    11-04    143  |  113<H>  |  13  ----------------------------<  84  3.7   |  20<L>  |  0.74    Ca    9.3      04 Nov 2018 03:51    TPro  6.4  /  Alb  3.8  /  TBili  0.4  /  DBili  x   /  AST  27  /  ALT  18  /  AlkPhos  87  11-02  PT/INR - ( 02 Nov 2018 21:39 )   PT: 11.7 sec;   INR: 1.02 ratio         PTT - ( 02 Nov 2018 21:39 )  PTT:19.5 sec  Hemoglobin A1C, Whole Blood: 5.4 % (11-03 @ 05:06)      IMAGING: Reviewed by me.     Head CT, CT perfusion, CTA head/Neck  (11/03/18) Atrophy. 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     CT perfusion as above.    CTA demonstrates decreased number of left middle cerebral artery branches   in the sylvian fissure compared with the right consistent with a distal   branch occlusion. There is a left distal A2 occlusion. THE PATIENT WAS SEEN AND EXAMINED BY ME WITH THE HOUSESTAFF AND STROKE TEAM DURING MORNING ROUNDS.   HPI:  85 yo right-handed Omani speaking woman who presents to [facility] as a transfer from [facility] for new and acute right-sided weakness and language disturbance (LWK 5-530pm, 11/2/18). ROS also revealed non-verbal, unresponsiveness. Otherwise limited due to patient's current cognitive state. Pertinent hx includes HTN and "heart condition" (unspecified w/ family at bedside). Patient's initial NIHSS score worsened to 28 upon arrival to [facility], was given tPA (1859pm, 11/2/18) and subsequently transferred for possible endovascular intervention. Patient's on arrival to [facility] ER NIHSS 22, pre-MRS 0, no M1 occlusion.     SUBJECTIVE: No events overnight.  No new neurologic complaints.      influenza   Vaccine 0.5 milliLiter(s) IntraMuscular once  sodium chloride 0.9%. 1000 milliLiter(s) IV Continuous <Continuous>      PHYSICAL EXAM:   Vital Signs Last 24 Hrs  T(C): 36.8 (04 Nov 2018 08:00), Max: 37.8 (03 Nov 2018 22:00)  T(F): 98.2 (04 Nov 2018 08:00), Max: 100 (03 Nov 2018 22:00)  HR: 59 (04 Nov 2018 10:00) (53 - 63)  BP: 142/54 (04 Nov 2018 10:00) (124/65 - 153/54)  BP(mean): 78 (04 Nov 2018 10:00) (78 - 98)  RR: 15 (04 Nov 2018 10:00) (14 - 18)  SpO2: 100% (04 Nov 2018 10:00) (97% - 100%)    General: No acute distress  HEENT:  right gaze palsy,  Abdomen: Soft, nontender, nondistended   Extremities: No edema    NEUROLOGICAL EXAM:  Mental status: Awake, alert, mute, globally aphasic, unable to follow commands, unable to mimic  Cranial Nerves: right facial droop,  right gaze palsy,  Motor exam: Right side hemiplegia RUE flaccid 0/5, RLE increased tone 0/5, triple flexion, LUE 5/5, LLE 5/5.  Sensation: grimaces to noxious stimuli  Coordination/ Gait: unable to participate with exam, pt aphasic     LABS:                        11.1   5.3   )-----------( 162      ( 04 Nov 2018 03:51 )             32.0    11-04    143  |  113<H>  |  13  ----------------------------<  84  3.7   |  20<L>  |  0.74    Ca    9.3      04 Nov 2018 03:51    TPro  6.4  /  Alb  3.8  /  TBili  0.4  /  DBili  x   /  AST  27  /  ALT  18  /  AlkPhos  87  11-02  PT/INR - ( 02 Nov 2018 21:39 )   PT: 11.7 sec;   INR: 1.02 ratio         PTT - ( 02 Nov 2018 21:39 )  PTT:19.5 sec  Hemoglobin A1C, Whole Blood: 5.4 % (11-03 @ 05:06)      IMAGING: Reviewed by me.   Brain MRI (11/03/18) left CYNDEE and left MCA territory infarcts with small amount of cortical hemorrhage in the left MCA infarct    Head CT, CT perfusion, CTA head/Neck  (11/03/18) Atrophy. 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     CTA demonstrates decreased number of left middle cerebral artery branches in the sylvian fissure compared with the right consistent with a distal branch occlusion. There is a left distal A2 occlusion. THE PATIENT WAS SEEN AND EXAMINED BY ME WITH THE HOUSESTAFF AND STROKE TEAM DURING MORNING ROUNDS.   HPI:  85 yo right-handed Scottish speaking woman who presents to [facility] as a transfer from [facility] for new and acute right-sided weakness and language disturbance (LWK 5-530pm, 11/2/18). ROS also revealed non-verbal, unresponsiveness. Otherwise limited due to patient's current cognitive state. Pertinent hx includes HTN and "heart condition" (unspecified w/ family at bedside). Patient's initial NIHSS score worsened to 28 upon arrival to [facility], was given tPA (1859pm, 11/2/18) and subsequently transferred for possible endovascular intervention. Patient's on arrival to [facility] ER NIHSS 22, pre-MRS 0, no M1 occlusion.     SUBJECTIVE: No events overnight.  No new neurologic complaints.      influenza   Vaccine 0.5 milliLiter(s) IntraMuscular once  sodium chloride 0.9%. 1000 milliLiter(s) IV Continuous <Continuous>      PHYSICAL EXAM:   Vital Signs Last 24 Hrs  T(C): 36.8 (04 Nov 2018 08:00), Max: 37.8 (03 Nov 2018 22:00)  T(F): 98.2 (04 Nov 2018 08:00), Max: 100 (03 Nov 2018 22:00)  HR: 59 (04 Nov 2018 10:00) (53 - 63)  BP: 142/54 (04 Nov 2018 10:00) (124/65 - 153/54)  BP(mean): 78 (04 Nov 2018 10:00) (78 - 98)  RR: 15 (04 Nov 2018 10:00) (14 - 18)  SpO2: 100% (04 Nov 2018 10:00) (97% - 100%)    General: No acute distress  HEENT:  right gaze palsy,  Abdomen: Soft, nontender, nondistended   Extremities: No edema    NEUROLOGICAL EXAM:  Mental status: Awake, alert, mute, globally aphasic, able to follow command close your eyes commands only, unable to mimic  Cranial Nerves: right facial droop,  right gaze palsy,  Motor exam: Right side hemiplegia RUE flaccid 0/5, RLE increased tone 0/5, triple flexion, LUE 5/5, LLE 5/5.  Sensation: grimaces to noxious stimuli  Coordination/ Gait: unable to participate with exam, pt aphasic     LABS:                        11.1   5.3   )-----------( 162      ( 04 Nov 2018 03:51 )             32.0    11-04    143  |  113<H>  |  13  ----------------------------<  84  3.7   |  20<L>  |  0.74    Ca    9.3      04 Nov 2018 03:51    TPro  6.4  /  Alb  3.8  /  TBili  0.4  /  DBili  x   /  AST  27  /  ALT  18  /  AlkPhos  87  11-02  PT/INR - ( 02 Nov 2018 21:39 )   PT: 11.7 sec;   INR: 1.02 ratio         PTT - ( 02 Nov 2018 21:39 )  PTT:19.5 sec  Hemoglobin A1C, Whole Blood: 5.4 % (11-03 @ 05:06)      IMAGING: Reviewed by me.   Brain MRI (11/03/18) left CYNDEE and left MCA territory infarcts with small amount of cortical hemorrhage in the left MCA infarct    Head CT, CT perfusion, CTA head/Neck  (11/03/18) Atrophy. 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     CTA demonstrates decreased number of left middle cerebral artery branches in the sylvian fissure compared with the right consistent with a distal branch occlusion. There is a left distal A2 occlusion. THE PATIENT WAS SEEN AND EXAMINED BY ME WITH THE HOUSESTAFF AND STROKE TEAM DURING MORNING ROUNDS.     HPI:  87 yo right-handed Japanese speaking woman who presents to [facility] as a transfer from [facility] for new and acute right-sided weakness and language disturbance (LWK 5-530pm, 11/2/18). ROS also revealed non-verbal, unresponsiveness. Otherwise limited due to patient's current cognitive state. Pertinent hx includes HTN and "heart condition" (unspecified w/ family at bedside). Patient's initial NIHSS score worsened to 28 upon arrival to [facility], was given tPA (1859pm, 11/2/18) and subsequently transferred for possible endovascular intervention. Patient's on arrival to [facility] ER NIHSS 22, pre-MRS 0, no M1 occlusion.     SUBJECTIVE: No events overnight.  No new neurologic complaints.      ROS: All negative except documented above.    influenza   Vaccine 0.5 milliLiter(s) IntraMuscular once  sodium chloride 0.9%. 1000 milliLiter(s) IV Continuous <Continuous>    PHYSICAL EXAM:   Vital Signs Last 24 Hrs  T(C): 36.8 (04 Nov 2018 08:00), Max: 37.8 (03 Nov 2018 22:00)  T(F): 98.2 (04 Nov 2018 08:00), Max: 100 (03 Nov 2018 22:00)  HR: 59 (04 Nov 2018 10:00) (53 - 63)  BP: 142/54 (04 Nov 2018 10:00) (124/65 - 153/54)  BP(mean): 78 (04 Nov 2018 10:00) (78 - 98)  RR: 15 (04 Nov 2018 10:00) (14 - 18)  SpO2: 100% (04 Nov 2018 10:00) (97% - 100%)    General: No acute distress  HEENT:  right gaze palsy,  Abdomen: Soft, nontender, nondistended   Extremities: No edema    NEUROLOGICAL EXAM:  Mental status: Awake, alert, mute, globally aphasic, able to follow command close your eyes commands only, unable to mimic  Cranial Nerves: right facial droop, right gaze palsy,  Motor exam: right hypotonic hemiplegia and was able to move LUE and LLE against gravity without noticeable weakness   Sensation: grimaces to noxious stimuli  Coordination/ Gait: unable to participate with exam, pt aphasic     LABS:                        11.1   5.3   )-----------( 162      ( 04 Nov 2018 03:51 )             32.0    11-04    143  |  113<H>  |  13  ----------------------------<  84  3.7   |  20<L>  |  0.74    Ca    9.3      04 Nov 2018 03:51    TPro  6.4  /  Alb  3.8  /  TBili  0.4  /  DBili  x   /  AST  27  /  ALT  18  /  AlkPhos  87  11-02  PT/INR - ( 02 Nov 2018 21:39 )   PT: 11.7 sec;   INR: 1.02 ratio         PTT - ( 02 Nov 2018 21:39 )  PTT:19.5 sec  Hemoglobin A1C, Whole Blood: 5.4 % (11-03 @ 05:06)      IMAGING: Reviewed by me.   Brain MRI (11/03/18) left CYNDEE and left MCA territory infarcts with small amount of cortical hemorrhage in the left MCA infarct    Head CT, CT perfusion, CTA head/Neck  (11/03/18) Atrophy. 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     CTA demonstrates decreased number of left middle cerebral artery branches in the sylvian fissure compared with the right consistent with a distal branch occlusion. There is a left distal A2 occlusion.

## 2018-11-04 NOTE — SWALLOW BEDSIDE ASSESSMENT ADULT - SWALLOW EVAL: DIAGNOSIS
Pt is nonverbal and unable to fully participate in bedside assessment.  Pt able to strip utensil and adequately form and transfer bolus and trigger pharyngeal swallow. Would recommend a MBS to formally assess and determine candidacy for oral diet. Pt is nonverbal and unable to fully participate in bedside assessment. Given honey thickened fluid, Pt able to strip utensil, adequately form and transfer bolus, and trigger pharyngeal swallow. Would recommend a MBS to formally assess and determine candidacy for oral diet. Suspect global aphasia. Consider formal speech and language evaluation.

## 2018-11-04 NOTE — PHYSICAL THERAPY INITIAL EVALUATION ADULT - PRECAUTIONS/LIMITATIONS, REHAB EVAL
CONT: in the sylvian fissure compared with the right consistent with a distal branch occlusion. There is a left distal A2 occlusion. MRI Head 11/3:  left CYNDEE and left MCA territory infarcts with small amount of cortical hemorrhage in the left MCA infarct. CONT: in the sylvian fissure compared with the right consistent with a distal branch occlusion. There is a left distal A2 occlusion. MRI Head 11/3:  left CYNDEE and left MCA territory infarcts with small amount of cortical hemorrhage in the left MCA infarct./fall precautions

## 2018-11-04 NOTE — PHYSICAL THERAPY INITIAL EVALUATION ADULT - BALANCE TRAINING, PT EVAL
GOAL: Pt will increase static/ dynamic standing balance to [place] with RW to improve safety with functional activities in 4 weeks.

## 2018-11-04 NOTE — PROGRESS NOTE ADULT - ASSESSMENT
ASSESSMENT: This is a 86ywoman with    NEURO: Continue close monitoring for neurologic deterioration, permissive HTN, titrate statin to LDL goal less than 70, MRI Brain w/o, MRA Head w/o and Neck w/contrast. Physical therapy/OT/Speech eval/treatment.     ANTITHROMBOTIC THERAPY:     PULMONARY: CXR clear, protecting airway, saturating well     CARDIOVASCULAR: check TTE, cardiac monitoring                              SBP goal:     GASTROINTESTINAL: Ulcer prophylaxis, dysphagia screen       Diet:     RENAL: BUN/Cr stable, good urine output      Na Goal: Greater than 135     Matias:    HEMATOLOGY: H/H stable, Platelets normal      DVT ppx: Heparin s.c [] LMWH []     ID: afebrile, no leukocytosis     OTHER:     DISPOSITION: Rehab or home depending on PT eval once stable and workup is complete    CORE MEASURES:        Admission NIHSS:      TPA: [] YES [] NO      LDL/HDL:     Depression Screen:      Statin Therapy:     Dysphagia Screen: [] PASS [] FAIL     Smoking [] YES [] NO      Afib [] YES [] NO     Stoke Education [] YES [] NO ASSESSMENT: This is a 86ywoman with    NEURO: Continue close monitoring for neurologic deterioration, permissive HTN with slow titration to normotensive, ASA and plan to start statin once PO access obtained, titrate statin to LDL goal less than 70,  MRI Brain w/o, MRA Head w/o and Neck w/contrast results as above. Pending Physical therapy/OT/Speech eval/treatment.     ANTITHROMBOTIC THERAPY: ASA LA    PULMONARY: CXR clear, protecting airway, saturating well     CARDIOVASCULAR: check TTE, cardiac monitoring                              SBP goal:     GASTROINTESTINAL: Ulcer prophylaxis, dysphagia screen       Diet:     RENAL: BUN/Cr stable, good urine output      Na Goal: Greater than 135     Matias:    HEMATOLOGY: H/H stable, Platelets normal      DVT ppx:  LMWH     ID: afebrile, no leukocytosis     OTHER:     DISPOSITION: Rehab or home depending on PT eval once stable and workup is complete    CORE MEASURES:        Admission NIHSS: 22     TPA: YES       LDL/HDL: 104/54     Depression Screen: NA     Statin Therapy: Once PO access obtained     Dysphagia Screen: FAIL     Smoking  NO      Afib NO     Stroke Education  YES to family ASSESSMENT: 86 years old woman with multiple vascular risk factors, including age and HTN was transferred from [facility] to [facility] for acute onset of right-sided weakness. She was reported to be last seen to be normal, walking and talking, without any difficulties at around 5:00 to 5:30 PM. Soon after that. She was noted to have developed acute onset of right-sided weakness and language disturbance. She was initially evaluated at [facility] through tele-stroke and was treated with IV tPA. She was subsequently transferred to [facility] for further management. CT brain on arrival showed early changes of ischemia in the left MCA and CYNDEE distribution. CTA head and neck to my eye showed left MCA M3 (likely angular division) and left CYNDEE (A2/A3) pericallosal branch occlusions. CT perfusion showed cord infarct being 26 mL and minimally hypoperfused brain tissue being 47 mL. Case discussed with Dr. Barakat; based on discussion - risks for undergoing mechanical embolectomy are thought to outweigh potential benefits considering distal occlusions noted on CTA head and neck and CT perfusion findings thus patient was not deemed to be a candidate for mechanical embolectomy.    Impression:  Cerebral embolism with cerebral infarction. 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 Pending Physical therapy/OT/Speech eval/treatment.     ANTITHROMBOTIC THERAPY: ASA MI    PULMONARY: Protecting airway, saturating well     CARDIOVASCULAR: check TTE, cardiac monitoring no events, plan for possible LOOP to monitor for occult arrhythmias like afib to be considered based on results of TTE                             SBP goal: -180    GASTROINTESTINAL: dysphagia screen failed. Pt made NPO, awaiting official S/S eval      Diet: NPO    RENAL: BUN/Cr stable, good urine output      Na Goal: Greater than 135     Matias:    HEMATOLOGY: H/H stable, Platelets normal      DVT ppx:  LMWH     ID: afebrile, no leukocytosis     OTHER:     DISPOSITION: Rehab or home depending on PT eval once stable and workup is complete    CORE MEASURES:        Admission NIHSS: 22     TPA: YES       LDL/HDL: 104/54     Depression Screen: NA     Statin Therapy: Once PO access obtained     Dysphagia Screen: FAIL     Smoking  NO      Afib NO     Stroke Education  YES to family ASSESSMENT: 86 years old woman with multiple vascular risk factors, including age and HTN was transferred from [facility] to [facility] for acute onset of right-sided weakness. She was reported to be last seen to be normal, walking and talking, without any difficulties at around 5:00 to 5:30 PM. Soon after that. She was noted to have developed acute onset of right-sided weakness and language disturbance. She was initially evaluated at [facility] through tele-stroke and was treated with IV tPA. She was subsequently transferred to [facility] for further management. CT brain on arrival showed early changes of ischemia in the left MCA and CYNDEE distribution. CTA head and neck to my eye showed left MCA M3 (likely angular division) and left CYNDEE (A2/A3) pericallosal branch occlusions. CT perfusion showed cord infarct being 26 mL and minimally hypoperfused brain tissue being 47 mL. Case discussed with Dr. Barakat; based on discussion - risks for undergoing mechanical embolectomy are thought to outweigh potential benefits considering distal occlusions noted on CTA head and neck and CT perfusion findings thus patient was not deemed to be a candidate for mechanical embolectomy.    Impression:  Cerebral embolism with cerebral infarction. 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 Pending Physical therapy/OT/Speech eval/treatment.     ANTITHROMBOTIC THERAPY: ASA IN    PULMONARY: Protecting airway, saturating well     CARDIOVASCULAR: check TTE, cardiac monitoring no events, plan for possible LOOP to monitor for occult arrhythmias like afib to be considered based on results of TTE                             SBP goal: -180    GASTROINTESTINAL: dysphagia screen failed. Pt made NPO, awaiting official S/S eval. Discussed with pt's family about NGT and PEG, they will discuss among themselves and will let us know about their decision     Diet: NPO    RENAL: BUN/Cr stable, urinary retention: 500ml retention, straight cath PRN     Na Goal: Greater than 135     Matias: N    HEMATOLOGY: H/H stable, Platelets normal      DVT ppx:  LMWH     ID: afebrile, no leukocytosis     OTHER:     DISPOSITION: Rehab or home depending on PT eval once stable and workup is complete    CORE MEASURES:        Admission NIHSS: 22     TPA: YES       LDL/HDL: 104/54     Depression Screen: NA     Statin Therapy: Once PO access obtained     Dysphagia Screen: FAIL     Smoking  NO      Afib NO     Stroke Education  YES to family ASSESSMENT:  86 years old woman with multiple vascular risk factors, including age and HTN was transferred from [facility] to [facility] for acute onset of right-sided weakness. She was reported to be last seen to be normal, walking and talking, without any difficulties at around 5:00 to 5:30 PM. Soon after that. She was noted to have developed acute onset of right-sided weakness and language disturbance. She was initially evaluated at [facility] through tele-stroke and was treated with IV tPA. She was subsequently transferred to [facility] for further management. CT brain on arrival showed early changes of ischemia in the left MCA and CYNDEE distribution. CTA head and neck to my eye showed left MCA M3 (likely angular division) and left CYNDEE (A2/A3) pericallosal branch occlusions. CT perfusion showed cord infarct being 26 mL and minimally hypoperfused brain tissue being 47 mL. Case discussed with Dr. Barakat; based on discussion - risks for undergoing mechanical embolectomy are thought to outweigh potential benefits considering distal occlusions noted on CTA head and neck and CT perfusion findings thus patient was not deemed to be a candidate for mechanical embolectomy. MRI brain showed left CYNDEE and MCA distribution infarct with mild hemorrhagic transformation (HI 1).     Impression:  Cerebral embolism with cerebral infarction. 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 Pending Physical therapy/OT/Speech eval/treatment.     ANTITHROMBOTIC THERAPY: ASA WA for second stroke prevention    PULMONARY: Protecting airway, saturating well     CARDIOVASCULAR: check TTE with bubble study, cardiac monitoring no events, plan for prolonged current monitoring with ICM to screen for occult cardiac arrhythmias like atrial fibrillation being the cardiac source of embolism to be considered based on results of TTE                             SBP goal: gradual normotension    GASTROINTESTINAL: dysphagia screen failed. Pt made NPO, awaiting official S/S eval. Discussed with pt's family about NGT and PEG, they will discuss among themselves and will let us know about their decision     Diet: NPO    RENAL: BUN/Cr stable, urinary retention: 500ml retention, straight cath PRN     Na Goal: Greater than 135     Matias: N    HEMATOLOGY: H/H stable, Platelets normal      DVT ppx:  LMWH     ID: afebrile, no leukocytosis     OTHER: above-mentioned plan was discussed with patient and available family members at bedside in detail. All the questions were answered and concerns were addressed.    DISPOSITION: Rehab or home depending on PT eval once stable and workup is complete    CORE MEASURES:        Admission NIHSS: 22     TPA: YES       LDL/HDL: 104/54     Depression Screen: NA     Statin Therapy: Once PO access obtained     Dysphagia Screen: FAIL     Smoking  NO      Afib NO     Stroke Education  YES to family

## 2018-11-04 NOTE — PHYSICAL THERAPY INITIAL EVALUATION ADULT - PERTINENT HX OF CURRENT PROBLEM, REHAB EVAL
87 yo F from [facility] as stroke rescue. Per granddaughter, patient was drinking tea at 5:30 pm when she dropped her tea out of R hand, became non verbal. NIH stroke scale initially 25 per documentation, worsened to 28 upon arrival to [place]. Given TPA and driven here via EMS.  CT Head 11/3: Small to moderate-sized acute left middle cerebral artery infarct involving the left frontal parietal cortex. CT perfusion as above. CTA demonstrates decreased number of left middle cerebral artery branches

## 2018-11-05 LAB
ANION GAP SERPL CALC-SCNC: 16 MMOL/L — SIGNIFICANT CHANGE UP (ref 5–17)
BUN SERPL-MCNC: 18 MG/DL — SIGNIFICANT CHANGE UP (ref 7–23)
CALCIUM SERPL-MCNC: 9.3 MG/DL — SIGNIFICANT CHANGE UP (ref 8.4–10.5)
CHLORIDE SERPL-SCNC: 112 MMOL/L — HIGH (ref 96–108)
CO2 SERPL-SCNC: 14 MMOL/L — LOW (ref 22–31)
CREAT SERPL-MCNC: 0.73 MG/DL — SIGNIFICANT CHANGE UP (ref 0.5–1.3)
GLUCOSE BLDC GLUCOMTR-MCNC: 148 MG/DL — HIGH (ref 70–99)
GLUCOSE BLDC GLUCOMTR-MCNC: 52 MG/DL — LOW (ref 70–99)
GLUCOSE BLDC GLUCOMTR-MCNC: 94 MG/DL — SIGNIFICANT CHANGE UP (ref 70–99)
GLUCOSE SERPL-MCNC: 59 MG/DL — LOW (ref 70–99)
HCT VFR BLD CALC: 33.8 % — LOW (ref 34.5–45)
HGB BLD-MCNC: 11.2 G/DL — LOW (ref 11.5–15.5)
MCHC RBC-ENTMCNC: 32.8 PG — SIGNIFICANT CHANGE UP (ref 27–34)
MCHC RBC-ENTMCNC: 33.2 GM/DL — SIGNIFICANT CHANGE UP (ref 32–36)
MCV RBC AUTO: 98.9 FL — SIGNIFICANT CHANGE UP (ref 80–100)
PLATELET # BLD AUTO: 167 K/UL — SIGNIFICANT CHANGE UP (ref 150–400)
POTASSIUM SERPL-MCNC: 3.9 MMOL/L — SIGNIFICANT CHANGE UP (ref 3.5–5.3)
POTASSIUM SERPL-SCNC: 3.9 MMOL/L — SIGNIFICANT CHANGE UP (ref 3.5–5.3)
RBC # BLD: 3.42 M/UL — LOW (ref 3.8–5.2)
RBC # FLD: 12 % — SIGNIFICANT CHANGE UP (ref 10.3–14.5)
SODIUM SERPL-SCNC: 142 MMOL/L — SIGNIFICANT CHANGE UP (ref 135–145)
WBC # BLD: 6 K/UL — SIGNIFICANT CHANGE UP (ref 3.8–10.5)
WBC # FLD AUTO: 6 K/UL — SIGNIFICANT CHANGE UP (ref 3.8–10.5)

## 2018-11-05 PROCEDURE — 99254 IP/OBS CNSLTJ NEW/EST MOD 60: CPT

## 2018-11-05 PROCEDURE — 74230 X-RAY XM SWLNG FUNCJ C+: CPT | Mod: 26

## 2018-11-05 PROCEDURE — 99233 SBSQ HOSP IP/OBS HIGH 50: CPT

## 2018-11-05 RX ORDER — DEXTROSE 50 % IN WATER 50 %
25 SYRINGE (ML) INTRAVENOUS ONCE
Qty: 0 | Refills: 0 | Status: COMPLETED | OUTPATIENT
Start: 2018-11-05 | End: 2018-11-05

## 2018-11-05 RX ORDER — ATORVASTATIN CALCIUM 80 MG/1
80 TABLET, FILM COATED ORAL AT BEDTIME
Qty: 0 | Refills: 0 | Status: DISCONTINUED | OUTPATIENT
Start: 2018-11-05 | End: 2018-11-06

## 2018-11-05 RX ORDER — ASPIRIN/CALCIUM CARB/MAGNESIUM 324 MG
81 TABLET ORAL DAILY
Qty: 0 | Refills: 0 | Status: DISCONTINUED | OUTPATIENT
Start: 2018-11-05 | End: 2018-11-15

## 2018-11-05 RX ADMIN — Medication 25 MILLILITER(S): at 04:21

## 2018-11-05 RX ADMIN — ATORVASTATIN CALCIUM 80 MILLIGRAM(S): 80 TABLET, FILM COATED ORAL at 21:47

## 2018-11-05 RX ADMIN — SODIUM CHLORIDE 75 MILLILITER(S): 9 INJECTION INTRAMUSCULAR; INTRAVENOUS; SUBCUTANEOUS at 13:08

## 2018-11-05 RX ADMIN — Medication 81 MILLIGRAM(S): at 13:07

## 2018-11-05 RX ADMIN — ENOXAPARIN SODIUM 40 MILLIGRAM(S): 100 INJECTION SUBCUTANEOUS at 13:08

## 2018-11-05 RX ADMIN — SODIUM CHLORIDE 75 MILLILITER(S): 9 INJECTION INTRAMUSCULAR; INTRAVENOUS; SUBCUTANEOUS at 06:33

## 2018-11-05 NOTE — DIETITIAN INITIAL EVALUATION ADULT. - OTHER INFO
Pt seen for: Length Of Stay   Adm dx: CVA    GI issues: no N/V  Last BM:  11/3   Food allergies:  NKFA  Vit/supplement PTA: none

## 2018-11-05 NOTE — OCCUPATIONAL THERAPY INITIAL EVALUATION ADULT - TRANSFER TRAINING, PT EVAL
Pt will complete sit to stand transfers with min A within 2 weeks Pt will complete sit to stand transfers with min A X 2 within 2 weeks

## 2018-11-05 NOTE — PROGRESS NOTE ADULT - SUBJECTIVE AND OBJECTIVE BOX
Subjective: Patient seen and examined. No new events except as noted.   remains in stroke unit   remains aphasic     REVIEW OF SYSTEMS:    Unable to obtain       MEDICATIONS:  MEDICATIONS  (STANDING):  aspirin  chewable 81 milliGRAM(s) Oral daily  atorvastatin 80 milliGRAM(s) Oral at bedtime  enoxaparin Injectable 40 milliGRAM(s) SubCutaneous daily  influenza   Vaccine 0.5 milliLiter(s) IntraMuscular once  sodium chloride 0.9%. 1000 milliLiter(s) (75 mL/Hr) IV Continuous <Continuous>      PHYSICAL EXAM:  T(C): 37.1 (11-05-18 @ 16:00), Max: 37.1 (11-04-18 @ 20:00)  HR: 56 (11-05-18 @ 16:00) (56 - 82)  BP: 132/47 (11-05-18 @ 16:00) (112/99 - 155/53)  RR: 19 (11-05-18 @ 16:00) (13 - 23)  SpO2: 100% (11-05-18 @ 16:00) (97% - 100%)  Wt(kg): --  I&O's Summary    04 Nov 2018 07:01  -  05 Nov 2018 07:00  --------------------------------------------------------  IN: 1350 mL / OUT: 1350 mL / NET: 0 mL    05 Nov 2018 07:01  -  05 Nov 2018 19:53  --------------------------------------------------------  IN: 0 mL / OUT: 300 mL / NET: -300 mL          Appearance: NAD aphasic   HEENT:   dry oral mucosa, PERRL, EOMI	  Lymphatic: No lymphadenopathy  Cardiovascular: Normal S1 S2, No JVD, No murmurs, No edema  Respiratory: Lungs clear to auscultation	  Psychiatry: A & O x 0, Mood & affect appropriate  Gastrointestinal:  Soft, Non-tender, + BS	  Skin: No rashes, No ecchymoses, No cyanosis	  Extremities: decreased range of motion, No clubbing, cyanosis or edema  Vascular: Peripheral pulses palpable 2+ bilaterally  NEUROLOGICAL EXAM:  Mental status: Awake, alert, mute, globally aphasic, able to follow some simple commands, unable to mimic, left hemineglect   Cranial Nerves: right facial droop, right gaze palsy,  Motor exam: right hypotonic hemiplegia and was able to move LUE and LLE against gravity without noticeable weakness   Sensation: grimaces to noxious stimuli  Coordination/ Gait: unable to participate with exam due to aphasic       LABS:    CARDIAC MARKERS:                                11.2   6.0   )-----------( 167      ( 05 Nov 2018 03:10 )             33.8     11-05    142  |  112<H>  |  18  ----------------------------<  59<L>  3.9   |  14<L>  |  0.73    Ca    9.3      05 Nov 2018 03:10      proBNP:   Lipid Profile:   HgA1c:   TSH:             TELEMETRY: 	    ECG:  	  RADIOLOGY:   DIAGNOSTIC TESTING:  [ ] Echocardiogram:  [ ]  Catheterization:  [ ] Stress Test:    OTHER:

## 2018-11-05 NOTE — OCCUPATIONAL THERAPY INITIAL EVALUATION ADULT - ADL RETRAINING, OT EVAL
Patient will dress upper body with minimal assistance in 2 weeks. Patient will dress lower body with minimal assistance, AE as needed in 2 weeks

## 2018-11-05 NOTE — OCCUPATIONAL THERAPY INITIAL EVALUATION ADULT - IMPAIRED TRANSFERS: SIT/STAND, REHAB EVAL
decreased sensation/decreased ROM/impaired balance/cognition/impaired coordination/decreased strength

## 2018-11-05 NOTE — PROGRESS NOTE ADULT - ASSESSMENT
86 years old woman with multiple vascular risk factors, including age and HTN was transferred from [facility] to [facility] for acute onset of right-sided weakness. She was reported to be last seen to be normal, walking and talking, without any difficulties at around 5:00 to 5:30 PM. Soon after that. She was noted to have developed acute onset of right-sided weakness and language disturbance. She was initially evaluated at [facility] through tele-stroke and was treated with IV tPA. She was subsequently transferred to [facility] for further management. CT brain on arrival showed early changes of ischemia in the left MCA and CYNDEE distribution. CTA head and neck to my eye showed left MCA M3 (likely angular division) and left CYNDEE (A2/A3) pericallosal branch occlusions. CT perfusion showed cord infarct being 26 mL and minimally hypoperfused brain tissue being 47 mL. Case discussed with Dr. Barakat; based on discussion - risks for undergoing mechanical embolectomy are thought to outweigh potential benefits considering distal occlusions noted on CTA head and neck and CT perfusion findings thus patient was not deemed to be a candidate for mechanical embolectomy. MRI brain showed left CYNDEE and MCA distribution infarct with mild hemorrhagic transformation (HI 1).     Impression:  Cerebral embolism with cerebral infarction. 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 Pending Physical therapy/OT recommend AR.     ANTITHROMBOTIC THERAPY: ASA ND for second stroke prevention    PULMONARY: Protecting airway, saturating well     CARDIOVASCULAR: check TTE with bubble study, cardiac monitoring no events, plan for prolonged current monitoring with ICM to screen for occult cardiac arrhythmias like atrial fibrillation being the cardiac source of embolism to be considered based on results of TTE                             SBP goal: gradual normotension    GASTROINTESTINAL: dysphagia screen failed. Pt made NPO, awaiting official S/S eval. Discussed with pt's family about NGT and PEG, they will discuss among themselves and will let us know about their decision     Diet: NPO    RENAL: BUN/Cr stable, urinary retention: 500ml retention, straight cath PRN     Na Goal: Greater than 135     Matias: N    HEMATOLOGY: H/H without acute change, Platelets 167     DVT ppx:  LMWH     ID: afebrile, no leukocytosis     OTHER: above-mentioned plan was discussed with patient and available family members at bedside in detail. All the questions were answered and concerns were addressed.    DISPOSITION: Rehab or home depending on PT eval once stable and workup is complete    CORE MEASURES:        Admission NIHSS: 22     TPA: YES       LDL/HDL: 104/54     Depression Screen: NA     Statin Therapy: Once PO access obtained     Dysphagia Screen: FAIL     Smoking  NO      Afib NO     Stroke Education  YES to family 86 years old woman with multiple vascular risk factors, including age and HTN was transferred from [facility] to [facility] for acute onset of right-sided weakness. She was noted to have developed acute onset of right-sided weakness and language disturbance. She was initially evaluated at [facility] through tele-stroke and was treated with IV tPA. She was subsequently transferred to [facility] for further management. MRI brain showed left CYNDEE and MCA distribution infarct with mild hemorrhagic transformation (HI 1).     Impression:  Cerebral embolism with cerebral infarction. Left ICA (MCA+CYNDEE) distribution stroke - likely etiology embolic stroke of unknown source    NEURO: Neurologically without acute change- remains with global aphasia and right hemiplegia. Continue close monitoring for neurologic deterioration, permissive HTN with slow titration to normotensive, Admission -  titrate statin to LDL goal less than 70,  MRI Brain w/o, MRA Head w/o and Neck w/contrast results as above. Pending Physical therapy recommend AR. OT consulted for RUE/RLE splints.     ANTITHROMBOTIC THERAPY: ASA for second stroke prevention    PULMONARY: Protecting airway, saturating well     CARDIOVASCULAR: check TTE with bubble study, cardiac monitoring no events;                           SBP goal: gradual normotension    GASTROINTESTINAL: dysphagia screen failed. Pt made NPO, s/p B     Diet: NPO    RENAL: BUN/Cr without acute change, urinary retention: 500ml retention, straight cath PRN     Na Goal: Greater than 135     Matias: N    HEMATOLOGY: H/H without acute change, Platelets 167     DVT ppx:  LMWH     ID: afebrile, no leukocytosis     OTHER: above-mentioned plan was discussed with patient and available family members at bedside in detail. All the questions were answered and concerns were addressed.    DISPOSITION: Rehab once workup is complete    CORE MEASURES:        Admission NIHSS: 22     TPA: YES       LDL/HDL: 104/54     Depression Screen: NA due to global aphasia     Statin Therapy: yes     Dysphagia Screen: FAIL     Smoking  NO      Afib NO     Stroke Education  YES to family 86 years old woman with multiple vascular risk factors, including age and HTN was transferred from [facility] to [facility] for acute onset of right-sided weakness. She was noted to have developed acute onset of right-sided weakness and language disturbance. She was initially evaluated at [facility] through tele-stroke and was treated with IV tPA. She was subsequently transferred to [facility] for further management. MRI brain showed left CYNDEE and MCA distribution infarct with mild hemorrhagic transformation (HI 1). Impression: Cerebral embolism with cerebral infarction. Left ICA (MCA+CYNDEE) distribution stroke - likely etiology embolic stroke of unknown source    NEURO: Neurologically without acute change- remains with global aphasia and right hemiplegia. Continue close monitoring for neurologic deterioration, permissive HTN with slow titration to normotensive, Admission -  titrate statin to LDL goal less than 70,  MRI Brain w/o, MRA Head w/o and Neck w/contrast results as above. Pending Physical therapy recommend AR. OT consulted for RUE/RLE splints.     ANTITHROMBOTIC THERAPY: ASA for second stroke prevention    PULMONARY: Protecting airway, saturating well     CARDIOVASCULAR: No TTE needed. Cardiac monitoring no events;                           SBP goal: gradual normotension    GASTROINTESTINAL: dysphagia screen failed. Pt made NPO, s/p MBS- passed, transition to Dysphagia 1 diet.     Diet: NPO    RENAL: BUN/Cr without acute change, urinary retention: 500ml retention, straight cath PRN     Na Goal: Greater than 135     Matias: N    HEMATOLOGY: H/H without acute change, Platelets 167     DVT ppx:  LMWH     ID: afebrile, no leukocytosis     OTHER: above-mentioned plan was discussed with patient and available family members at bedside in detail. All the questions were answered and concerns were addressed.    DISPOSITION: Poor prognosis for meaningful recovery. Discuss goals of care with family. Palliative consult. Rehab once workup is complete    CORE MEASURES:        Admission NIHSS: 22     TPA: YES       LDL/HDL: 104/54     Depression Screen: NA due to global aphasia     Statin Therapy: yes     Dysphagia Screen: FAIL     Smoking  NO      Afib NO     Stroke Education  YES to family

## 2018-11-05 NOTE — SWALLOW VFSS/MBS ASSESSMENT ADULT - SLP PERTINENT HISTORY OF CURRENT PROBLEM
85 yo right-handed Malay speaking woman who presents to [facility] as a transfer from [facility] for new and acute right-sided weakness and language disturbance (LWK 5-530pm, 11/2/18). ROS also revealed non-verbal, unresponsiveness. Otherwise limited due to patient's current cognitive state. Pertinent hx includes HTN and "heart condition" (unspecified w/ family at bedside). Patient's initial NIHSS score worsened to 28 upon arrival to [facility], was given tPA (1859pm, 11/2/18) and subsequently transferred for possible endovascular intervention. Patient's current NIHSS 22, pre-MRS 0, no M1 occlusion.

## 2018-11-05 NOTE — OCCUPATIONAL THERAPY INITIAL EVALUATION ADULT - ADDITIONAL COMMENTS
MR head 11/3- Acute infarcts in the left anterior and middle cerebral artery territory with restricted diffusion and a small amount of hemorrhage on susceptibility series.  CT Head 11/3- Atrophy. 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 CT perfusion as above. CTA demonstrates decreased number of left middle cerebral artery branches in the sylvian fissure compared with the right consistent with a distal branch occlusion. There is a left distal A2 occlusion.

## 2018-11-05 NOTE — OCCUPATIONAL THERAPY INITIAL EVALUATION ADULT - RANGE OF MOTION EXAMINATION, UPPER EXTREMITY
Left UE Active ROM was WNL (within normal limits)/Right UE Passive ROM was WNL (within normal limits)

## 2018-11-05 NOTE — OCCUPATIONAL THERAPY INITIAL EVALUATION ADULT - DIAGNOSIS, OT EVAL
Patient with decreased ADL status and impairments with functional mobility due to Patient with decreased ADL status and impairments with functional mobility due to decreased cognition, ROM, strength, coordination

## 2018-11-05 NOTE — SWALLOW VFSS/MBS ASSESSMENT ADULT - DIAGNOSTIC IMPRESSIONS
Pt presents with an oropharyngeal dysphagia marked by post swallow oral residue in the oral cavity, bolus spillover, and laryngeal penetration and aspiration with sensation with nectar thickened fluid. While cough is effective in clearing material from the trachea, residue remains in the laryngeal vestibule. Chewables were not administered 2/2 edentulous state and oral impairment. Patient was not a candidate for postural strategies given reduced cognition.   Disorders include reduced lingual strength/ROM/Rate of motion, reduced tongue to palate contact, reduced BOT to posterior pharyngeal wall contact, delay in trigger of the swallow reflex, reduced hyo-laryngeal excursion, and reduced laryngeal closure. Pt presents with an oropharyngeal dysphagia marked by post swallow residue in the oral cavity, bolus spillover, and laryngeal penetration and aspiration with sensation with nectar thickened fluid. While cough is effective in clearing material from the trachea, residue remains in the laryngeal vestibule. Chewables were not administered 2/2 edentulous state and oral impairment. Patient was not a candidate for postural strategies given reduced cognition.   Disorders include reduced lingual strength/ROM/Rate of motion, reduced tongue to palate contact, reduced BOT to posterior pharyngeal wall contact, delay in trigger of the swallow reflex, reduced hyo-laryngeal excursion, and reduced laryngeal closure.

## 2018-11-05 NOTE — SWALLOW VFSS/MBS ASSESSMENT ADULT - SLP GENERAL OBSERVATIONS
Pt secure in AURY, French speaking, tae Jj preferred to serve as , non-verbal and not attempting to communicate, (-) follow commands

## 2018-11-05 NOTE — OCCUPATIONAL THERAPY INITIAL EVALUATION ADULT - BALANCE TRAINING, PT EVAL
Pt will increase dynamic sitting balance 1/2 grade to increase safety/independence with seated ADLs within 2 weeks

## 2018-11-05 NOTE — OCCUPATIONAL THERAPY INITIAL EVALUATION ADULT - BED MOBILITY TRAINING, PT EVAL
Pt will transfer sit <->sup with min A in 2 weeks Pt will transfer sit <->sup with min A X 2in 2 weeks

## 2018-11-05 NOTE — CONSULT NOTE ADULT - SUBJECTIVE AND OBJECTIVE BOX
Cc: R weakness, aphasia    Admission HPI:  85 yo right-handed Chinese speaking woman who presents to [facility] as a transfer from [facility] for new and acute right-sided weakness and language disturbance (LWK 5-530pm, 11/2/18). ROS also revealed non-verbal, unresponsiveness. Otherwise limited due to patient's current cognitive state. Pertinent hx includes HTN and "heart condition" (unspecified w/ family at bedside). Patient's initial NIHSS score worsened to 28 upon arrival to [facility], was given tPA (1859pm, 11/2/18) and subsequently transferred for possible endovascular intervention. Patient's current NIHSS 22, pre-MRS 0, no M1 occlusion. 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 Patient with persistent functional deficits.    REVIEW OF SYSTEMS:  Aphasic    PAST MEDICAL & SURGICAL HISTORY  HTN (hypertension)  S/P appendectomy    FUNCTIONAL HISTORY:   Lives with grandkids in home with stairs.  PTA Independent with ambulation and ADLs    CURRENT FUNCTIONAL STATUS:  Max A    FAMILY HISTORY   No pertinent family history in first degree relatives    RECENT LABS/IMAGING  CBC Full  -  ( 05 Nov 2018 03:10 )  WBC Count : 6.0 K/uL  Hemoglobin : 11.2 g/dL  Hematocrit : 33.8 %  Platelet Count - Automated : 167 K/uL  Mean Cell Volume : 98.9 fl  Mean Cell Hemoglobin : 32.8 pg  Mean Cell Hemoglobin Concentration : 33.2 gm/dL  Auto Neutrophil # : x  Auto Lymphocyte # : x  Auto Monocyte # : x  Auto Eosinophil # : x  Auto Basophil # : x  Auto Neutrophil % : x  Auto Lymphocyte % : x  Auto Monocyte % : x  Auto Eosinophil % : x  Auto Basophil % : x    11-05    142  |  112<H>  |  18  ----------------------------<  59<L>  3.9   |  14<L>  |  0.73    Ca    9.3      05 Nov 2018 03:10      VITALS  T(C): 37 (11-05-18 @ 12:00), Max: 37.1 (11-04-18 @ 16:00)  HR: 59 (11-05-18 @ 12:48) (59 - 82)  BP: 149/58 (11-05-18 @ 12:48) (127/61 - 155/53)  RR: 15 (11-05-18 @ 12:48) (13 - 23)  SpO2: 97% (11-05-18 @ 12:48) (97% - 100%)  Wt(kg): --    ALLERGIES  No Known Allergies      MEDICATIONS   aspirin  chewable 81 milliGRAM(s) Oral daily  atorvastatin 80 milliGRAM(s) Oral at bedtime  enoxaparin Injectable 40 milliGRAM(s) SubCutaneous daily  influenza   Vaccine 0.5 milliLiter(s) IntraMuscular once  sodium chloride 0.9%. 1000 milliLiter(s) IV Continuous <Continuous>      ----------------------------------------------------------------------------------------  PHYSICAL EXAM  Constitutional - NAD, Comfortable  HEENT - NCAT, EOMI  Neck - Supple, No limited ROM  Chest - CTA bilaterally, No wheeze, No rhonchi, No crackles  Cardiovascular - RRR, S1S2, No murmurs  Abdomen - BS+, Soft, NTND  Extremities - No C/C/E, No calf tenderness   Neurologic Exam -                    Cognitive - Awake, Alert, AAO to self, place, date, year, situation     Communication - Fluent, No dysarthria, no aphasia     Cranial Nerves - CN 2-12 intact     Motor - No focal deficits                       Sensory - Intact to LT     Reflexes - DTR Intact, No primitive reflexive     Balance - WNL Static  Psychiatric - Mood stable, Affect WNL      Impression:  85 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 ROS also revealed non-verbal, unresponsiveness. Otherwise limited due to patient's current cognitive state. Pertinent hx includes HTN and "heart condition" (unspecified w/ family at bedside). Patient's initial NIHSS score worsened to 28 upon arrival to [facility], was given tPA (1859pm, 11/2/18) and subsequently transferred for possible endovascular intervention. Patient's current NIHSS 22, pre-MRS 0, no M1 occlusion. 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 Patient with persistent functional deficits.  Patient's family at bedside to provide history.     REVIEW OF SYSTEMS: Not obtainable (Aphasic)    PAST MEDICAL & SURGICAL HISTORY  HTN (hypertension)  S/P appendectomy    FUNCTIONAL HISTORY:   Lives with grandkids in home with stairs.  PTA Independent with ambulation and ADLs    CURRENT FUNCTIONAL STATUS:  Max A    FAMILY HISTORY   No pertinent family history in first degree relatives    RECENT LABS/IMAGING  CBC Full  -  ( 05 Nov 2018 03:10 )  WBC Count : 6.0 K/uL  Hemoglobin : 11.2 g/dL  Hematocrit : 33.8 %  Platelet Count - Automated : 167 K/uL  Mean Cell Volume : 98.9 fl  Mean Cell Hemoglobin : 32.8 pg  Mean Cell Hemoglobin Concentration : 33.2 gm/dL  Auto Neutrophil # : x  Auto Lymphocyte # : x  Auto Monocyte # : x  Auto Eosinophil # : x  Auto Basophil # : x  Auto Neutrophil % : x  Auto Lymphocyte % : x  Auto Monocyte % : x  Auto Eosinophil % : x  Auto Basophil % : x    11-05    142  |  112<H>  |  18  ----------------------------<  59<L>  3.9   |  14<L>  |  0.73    Ca    9.3      05 Nov 2018 03:10      VITALS  T(C): 37 (11-05-18 @ 12:00), Max: 37.1 (11-04-18 @ 16:00)  HR: 59 (11-05-18 @ 12:48) (59 - 82)  BP: 149/58 (11-05-18 @ 12:48) (127/61 - 155/53)  RR: 15 (11-05-18 @ 12:48) (13 - 23)  SpO2: 97% (11-05-18 @ 12:48) (97% - 100%)  Wt(kg): --    ALLERGIES  No Known Allergies      MEDICATIONS   aspirin  chewable 81 milliGRAM(s) Oral daily  atorvastatin 80 milliGRAM(s) Oral at bedtime  enoxaparin Injectable 40 milliGRAM(s) SubCutaneous daily  influenza   Vaccine 0.5 milliLiter(s) IntraMuscular once  sodium chloride 0.9%. 1000 milliLiter(s) IV Continuous <Continuous>    PHYSICAL EXAM  Constitutional - NAD, Comfortable  HEENT - NCAT, EOMI  Neck - Supple, No limited ROM  Chest - CTA bilaterally, No wheeze, No rhonchi, No crackles  Cardiovascular - RRR, S1S2, No murmurs  Abdomen - BS+, Soft, NTND  Extremities - No C/C/E, No calf tenderness   Neurologic Exam -                    Cognitive - Awake, Alert     Communication - + expressive aphasia, follows simple instructions in Ukrainian     Motor - Right side 0/5     MAS 1+ extensor tone on the RLE        Psychiatric -Affect WNL      Impression:  87 yo with CVA with Right hemiparesis, aphasia, gait dysfunction      Plan:  PT- ROM, Bed Mob, Transfers, Amb w AD and bracing as needed  OT- ADLs, bracing  SLP- Dysphagia eval and treat  Prec- Falls, Cardiac  DVT Prophylaxis- lovenox  Skin- Turn q2 h  Dispo- Acute Rehab- can tolerate 3h/d PT/OT/SLP and requires daily physician visits  ELOS 5 wks  LTG- Min A with transfers/gait w ADs   Dispo from rehab- d/w with patients family and they are available to help and assist 24/7  Discussed with family functional prognosis.

## 2018-11-05 NOTE — OCCUPATIONAL THERAPY INITIAL EVALUATION ADULT - IMPAIRED TRANSFERS: BED/CHAIR, REHAB EVAL
impaired coordination/abnormal muscle tone/decreased ROM/decreased sensation/impaired balance/cognition/decreased strength

## 2018-11-05 NOTE — OCCUPATIONAL THERAPY INITIAL EVALUATION ADULT - PRECAUTIONS/LIMITATIONS, REHAB EVAL
fall precautions/Otherwise limited due to patient's current cognitive state. Pertinent hx includes HTN and "heart condition" (unspecified w/ family at bedside). Patient's initial NIHSS score worsened to 28 upon arrival to [facility], was given tPA (1859pm, 11/2/18) and subsequently transferred for possible endovascular intervention. Patient's current NIHSS 22, pre-MRS 0, no M1 occlusion

## 2018-11-05 NOTE — OCCUPATIONAL THERAPY INITIAL EVALUATION ADULT - PLANNED THERAPY INTERVENTIONS, OT EVAL
cognitive, visual perceptual/ADL retraining/balance training/bed mobility training/transfer training/ROM/strengthening

## 2018-11-05 NOTE — DIETITIAN INITIAL EVALUATION ADULT. - ENERGY NEEDS
Ht: 60"  Wt: 136   BMI: 26.5 kg/m2   IBW: 100 (+/-10%)     136% IBW  Edema:  none       Skin: no pressure injuries documented

## 2018-11-05 NOTE — SWALLOW VFSS/MBS ASSESSMENT ADULT - ORAL PHASE
Delayed oral transit time/Reduced anterior - posterior transport/Residue in oral cavity/trace mild to the level of the aryepiglottic folds/Reduced anterior - posterior transport/Residue in oral cavity/Uncontrolled bolus / spillover in onofre-pharynx/Uncontrolled bolus / spillover in hypopharynx/Incomplete tongue to palate contact Reduced anterior - posterior transport/Incomplete tongue to palate contact/Residue in oral cavity/Uncontrolled bolus / spillover in onofre-pharynx

## 2018-11-05 NOTE — SWALLOW VFSS/MBS ASSESSMENT ADULT - MODE OF PRESENTATION
spoon/fed by clinician unable to hold cup and raise to mouth/spoon/fed by clinician cup/fed by clinician

## 2018-11-05 NOTE — SWALLOW VFSS/MBS ASSESSMENT ADULT - RECOMMENDED CONSISTENCY
Dysphagia 1 with honey thickened fluid via teaspoon only!  MD/team Please enter the following as notes to RN: 1) Full assist with meals, 2) Crush meds or provide via alternate source, 3) ALL p.o. via teaspoon. 4) Provide small single bites and sips at slow rate 5) Encourage successive swallows for oral and pharyngeal clearance 6) Alternate food and liquids to aid in oral and pharyngeal clearance 7) Aspiration precautions. Monitor for s/s aspiration/laryngeal penetration. If noted:  D/C p.o. intake, provide non-oral nutrition/hydration/meds.

## 2018-11-05 NOTE — OCCUPATIONAL THERAPY INITIAL EVALUATION ADULT - LIVES WITH, PROFILE
other relative/children/As per son, pt lives with daughter and grandchildren in a pvt home. No steps to enter, and no stairs inside. No DME at home

## 2018-11-05 NOTE — DIETITIAN INITIAL EVALUATION ADULT. - NS AS NUTRI INTERV MEALS SNACK
If cleared for po, recommend no diet restrictions, texture per ST recommendation/General/healthful diet If cleared for po, recommend low Na diet, texture per ST recommendation/General/healthful diet

## 2018-11-05 NOTE — PROGRESS NOTE ADULT - SUBJECTIVE AND OBJECTIVE BOX
THE PATIENT WAS SEEN AND EXAMINED BY ME WITH THE HOUSESTAFF AND STROKE TEAM DURING MORNING ROUNDS.     HPI:  85 yo right-handed Arabic speaking woman who presents to [facility] as a transfer from [facility] for new and acute right-sided weakness and language disturbance (LWK 5-530pm, 11/2/18). ROS also revealed non-verbal, unresponsiveness. Otherwise limited due to patient's current cognitive state. Pertinent hx includes HTN and "heart condition" (unspecified w/ family at bedside). Patient's initial NIHSS score worsened to 28 upon arrival to [facility], was given tPA (1859pm, 11/2/18) and subsequently transferred for possible endovascular intervention. Patient's on arrival to [facility] ER NIHSS 22, pre-MRS 0, no M1 occlusion.     SUBJECTIVE: No events overnight.  No new neurologic complaints.      ROS: All negative except documented above.    aspirin Suppository 300 milliGRAM(s) Rectal daily  enoxaparin Injectable 40 milliGRAM(s) SubCutaneous daily  influenza   Vaccine 0.5 milliLiter(s) IntraMuscular once  sodium chloride 0.9%. 1000 milliLiter(s) IV Continuous <Continuous>    PHYSICAL EXAM:   Vital Signs Last 24 Hrs  T(C): 37.1 (04 Nov 2018 20:00), Max: 37.1 (04 Nov 2018 16:00)  T(F): 98.8 (04 Nov 2018 20:00), Max: 98.8 (04 Nov 2018 16:00)  HR: 61 (05 Nov 2018 06:00) (57 - 82)  BP: 136/51 (05 Nov 2018 06:00) (110/63 - 150/56)  BP(mean): 76 (05 Nov 2018 06:00) (73 - 126)  RR: 13 (05 Nov 2018 06:00) (13 - 23)  SpO2: 99% (05 Nov 2018 06:00) (97% - 100%)    General: No acute distress  HEENT:  right gaze palsy,  Abdomen: Soft, nontender, nondistended   Extremities: No edema    NEUROLOGICAL EXAM:  Mental status: Awake, alert, mute, globally aphasic, able to follow command close your eyes commands only, unable to mimic  Cranial Nerves: right facial droop, right gaze palsy,  Motor exam: right hypotonic hemiplegia and was able to move LUE and LLE against gravity without noticeable weakness   Sensation: grimaces to noxious stimuli  Coordination/ Gait: unable to participate with exam due to aphasic     LABS:                        11.2   6.0   )-----------( 167      ( 05 Nov 2018 03:10 )             33.8    11-05    142  |  112<H>  |  18  ----------------------------<  59<L>  3.9   |  14<L>  |  0.73    Ca    9.3      05 Nov 2018 03:10    Hemoglobin A1C, Whole Blood: 5.4 % (11-03 @ 05:06)    IMAGING: Reviewed by me.     Brain MRI (11/03/18) left CYNDEE and left MCA territory infarcts with small amount of cortical hemorrhage in the left MCA infarct    Head CT, CT perfusion, CTA head/Neck  (11/03/18) Atrophy. 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     CTA demonstrates decreased number of left middle cerebral artery branches in the sylvian fissure compared with the right consistent with a distal branch occlusion. There is a left distal A2 occlusion. THE PATIENT WAS SEEN AND EXAMINED BY ME WITH THE HOUSESTAFF AND STROKE TEAM DURING MORNING ROUNDS.     HPI:  87 yo right-handed Greek speaking woman who presents to [facility] as a transfer from [facility] for new and acute right-sided weakness and language disturbance (LWK 5-530pm, 11/2/18). ROS also revealed non-verbal, unresponsiveness. Otherwise limited due to patient's current cognitive state. Pertinent hx includes HTN and "heart condition" (unspecified w/ family at bedside). Patient's initial NIHSS score worsened to 28 upon arrival to [facility], was given tPA (1859pm, 11/2/18) and subsequently transferred for possible endovascular intervention. Patient's on arrival to [facility] ER NIHSS 22, pre-MRS 0, no M1 occlusion.     SUBJECTIVE: No events overnight.  No new neurologic complaints.      ROS: All negative except documented above.    aspirin Suppository 300 milliGRAM(s) Rectal daily  enoxaparin Injectable 40 milliGRAM(s) SubCutaneous daily  influenza   Vaccine 0.5 milliLiter(s) IntraMuscular once  sodium chloride 0.9%. 1000 milliLiter(s) IV Continuous <Continuous>    PHYSICAL EXAM:   Vital Signs Last 24 Hrs  T(C): 37.1 (04 Nov 2018 20:00), Max: 37.1 (04 Nov 2018 16:00)  T(F): 98.8 (04 Nov 2018 20:00), Max: 98.8 (04 Nov 2018 16:00)  HR: 61 (05 Nov 2018 06:00) (57 - 82)  BP: 136/51 (05 Nov 2018 06:00) (110/63 - 150/56)  BP(mean): 76 (05 Nov 2018 06:00) (73 - 126)  RR: 13 (05 Nov 2018 06:00) (13 - 23)  SpO2: 99% (05 Nov 2018 06:00) (97% - 100%)    General: No acute distress  HEENT:  right gaze palsy,  Abdomen: Soft, nontender, nondistended   Extremities: No edema    NEUROLOGICAL EXAM:  Mental status: Awake, alert, mute, globally aphasic, able to follow some simple commands, unable to mimic, left hemineglect   Cranial Nerves: right facial droop, right gaze palsy,  Motor exam: right hypotonic hemiplegia and was able to move LUE and LLE against gravity without noticeable weakness   Sensation: grimaces to noxious stimuli  Coordination/ Gait: unable to participate with exam due to aphasic     LABS:                        11.2   6.0   )-----------( 167      ( 05 Nov 2018 03:10 )             33.8    11-05    142  |  112<H>  |  18  ----------------------------<  59<L>  3.9   |  14<L>  |  0.73    Ca    9.3      05 Nov 2018 03:10    Hemoglobin A1C, Whole Blood: 5.4 % (11-03 @ 05:06)    IMAGING: Reviewed by me.     Brain MRI (11/03/18) left CYNDEE and left MCA territory infarcts with small amount of cortical hemorrhage in the left MCA infarct    Head CT, CT perfusion, CTA head/Neck  (11/03/18) Atrophy. 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     CTA demonstrates decreased number of left middle cerebral artery branches in the sylvian fissure compared with the right consistent with a distal branch occlusion. There is a left distal A2 occlusion.

## 2018-11-05 NOTE — OCCUPATIONAL THERAPY INITIAL EVALUATION ADULT - PERTINENT HX OF CURRENT PROBLEM, REHAB EVAL
87 yo right-handed Armenian speaking woman who presents to [facility] as a transfer from [facility] for new and acute right-sided weakness and language disturbance (LWK 5-530pm, 11/2/18). ROS also revealed non-verbal, unresponsiveness. Otherwise limited due to patient's current cognitive state.

## 2018-11-05 NOTE — DIETITIAN INITIAL EVALUATION ADULT. - PERTINENT MEDS FT
MEDICATIONS  (STANDING):  aspirin Suppository 300 milliGRAM(s) Rectal daily  enoxaparin Injectable 40 milliGRAM(s) SubCutaneous daily  influenza   Vaccine 0.5 milliLiter(s) IntraMuscular once  sodium chloride 0.9%. 1000 milliLiter(s) (75 mL/Hr) IV Continuous <Continuous>

## 2018-11-06 DIAGNOSIS — R13.10 DYSPHAGIA, UNSPECIFIED: ICD-10-CM

## 2018-11-06 DIAGNOSIS — R47.01 APHASIA: ICD-10-CM

## 2018-11-06 DIAGNOSIS — Z51.5 ENCOUNTER FOR PALLIATIVE CARE: ICD-10-CM

## 2018-11-06 DIAGNOSIS — I63.9 CEREBRAL INFARCTION, UNSPECIFIED: ICD-10-CM

## 2018-11-06 LAB
ANION GAP SERPL CALC-SCNC: 11 MMOL/L — SIGNIFICANT CHANGE UP (ref 5–17)
BUN SERPL-MCNC: 12 MG/DL — SIGNIFICANT CHANGE UP (ref 7–23)
CALCIUM SERPL-MCNC: 8.8 MG/DL — SIGNIFICANT CHANGE UP (ref 8.4–10.5)
CHLORIDE SERPL-SCNC: 113 MMOL/L — HIGH (ref 96–108)
CO2 SERPL-SCNC: 18 MMOL/L — LOW (ref 22–31)
CREAT SERPL-MCNC: 0.67 MG/DL — SIGNIFICANT CHANGE UP (ref 0.5–1.3)
GLUCOSE SERPL-MCNC: 89 MG/DL — SIGNIFICANT CHANGE UP (ref 70–99)
HCT VFR BLD CALC: 30.8 % — LOW (ref 34.5–45)
HGB BLD-MCNC: 10.7 G/DL — LOW (ref 11.5–15.5)
MCHC RBC-ENTMCNC: 33.9 PG — SIGNIFICANT CHANGE UP (ref 27–34)
MCHC RBC-ENTMCNC: 34.9 GM/DL — SIGNIFICANT CHANGE UP (ref 32–36)
MCV RBC AUTO: 97.2 FL — SIGNIFICANT CHANGE UP (ref 80–100)
PLATELET # BLD AUTO: 167 K/UL — SIGNIFICANT CHANGE UP (ref 150–400)
POTASSIUM SERPL-MCNC: 3.6 MMOL/L — SIGNIFICANT CHANGE UP (ref 3.5–5.3)
POTASSIUM SERPL-SCNC: 3.6 MMOL/L — SIGNIFICANT CHANGE UP (ref 3.5–5.3)
RBC # BLD: 3.17 M/UL — LOW (ref 3.8–5.2)
RBC # FLD: 11.7 % — SIGNIFICANT CHANGE UP (ref 10.3–14.5)
SODIUM SERPL-SCNC: 142 MMOL/L — SIGNIFICANT CHANGE UP (ref 135–145)
WBC # BLD: 3.3 K/UL — LOW (ref 3.8–10.5)
WBC # FLD AUTO: 3.3 K/UL — LOW (ref 3.8–10.5)

## 2018-11-06 PROCEDURE — 99233 SBSQ HOSP IP/OBS HIGH 50: CPT

## 2018-11-06 PROCEDURE — 99223 1ST HOSP IP/OBS HIGH 75: CPT

## 2018-11-06 PROCEDURE — 99232 SBSQ HOSP IP/OBS MODERATE 35: CPT

## 2018-11-06 RX ADMIN — SODIUM CHLORIDE 75 MILLILITER(S): 9 INJECTION INTRAMUSCULAR; INTRAVENOUS; SUBCUTANEOUS at 11:54

## 2018-11-06 RX ADMIN — ENOXAPARIN SODIUM 40 MILLIGRAM(S): 100 INJECTION SUBCUTANEOUS at 11:48

## 2018-11-06 RX ADMIN — SODIUM CHLORIDE 75 MILLILITER(S): 9 INJECTION INTRAMUSCULAR; INTRAVENOUS; SUBCUTANEOUS at 06:46

## 2018-11-06 RX ADMIN — Medication 81 MILLIGRAM(S): at 11:48

## 2018-11-06 NOTE — PROGRESS NOTE ADULT - SUBJECTIVE AND OBJECTIVE BOX
THE PATIENT WAS SEEN AND EXAMINED BY ME WITH THE HOUSESTAFF AND STROKE TEAM DURING MORNING ROUNDS.   HPI:  85 yo right-handed Burmese speaking woman who presents to [facility] as a transfer from [facility] for new and acute right-sided weakness and language disturbance (LWK 5-530pm, 11/2/18). ROS also revealed non-verbal, unresponsiveness. Otherwise limited due to patient's current cognitive state. Pertinent hx includes HTN and "heart condition" (unspecified w/ family at bedside). Patient's initial NIHSS score worsened to 28 upon arrival to [facility], was given tPA (1859pm, 11/2/18) and subsequently transferred for possible endovascular intervention. Patient's on arrival to [facility] ER NIHSS 22, pre-MRS 0, no M1 occlusion.     SUBJECTIVE: No events overnight.  No new neurologic complaints.      aspirin  chewable 81 milliGRAM(s) Oral daily  enoxaparin Injectable 40 milliGRAM(s) SubCutaneous daily  influenza   Vaccine 0.5 milliLiter(s) IntraMuscular once  sodium chloride 0.9%. 1000 milliLiter(s) IV Continuous <Continuous>      PHYSICAL EXAM:   Vital Signs Last 24 Hrs  T(C): 36.7 (06 Nov 2018 08:00), Max: 37.1 (05 Nov 2018 16:00)  T(F): 98.1 (06 Nov 2018 08:00), Max: 98.7 (05 Nov 2018 16:00)  HR: 64 (06 Nov 2018 10:00) (54 - 68)  BP: 122/45 (06 Nov 2018 10:00) (112/99 - 149/58)  BP(mean): 69 (06 Nov 2018 10:00) (69 - 105)  RR: 18 (06 Nov 2018 10:00) (13 - 19)  SpO2: 98% (06 Nov 2018 10:00) (97% - 100%)    General: No acute distress  HEENT:  right gaze palsy, RHH  Abdomen: Soft, nontender, nondistended   Extremities: No edema    NEUROLOGICAL EXAM:  Mental status: Awake, alert, mute, globally aphasic, able to follow some simple commands, unable to mimic, left hemineglect   Cranial Nerves: right facial droop, right gaze palsy, RHH  Motor exam: right hypotonic hemiplegia and was able to move LUE and LLE against gravity without noticeable weakness   Sensation: grimaces to noxious stimuli  Coordination/ Gait: unable to participate with exam due to aphasic     LABS:                        10.7   3.3   )-----------( 167      ( 06 Nov 2018 04:40 )             30.8    11-06    142  |  113<H>  |  12  ----------------------------<  89  3.6   |  18<L>  |  0.67    Ca    8.8      06 Nov 2018 04:40      Hemoglobin A1C, Whole Blood: 5.4 % (11-03 @ 05:06)      IMAGING: Reviewed by me.     Brain MRI (11/03/18) left CYNDEE and left MCA territory infarcts with small amount of cortical hemorrhage in the left MCA infarct    Head CT, CT perfusion, CTA head/Neck  (11/03/18) Atrophy. 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     CTA demonstrates decreased number of left middle cerebral artery branches in the sylvian fissure compared with the right consistent with a distal branch occlusion. There is a left distal A2 occlusion. THE PATIENT WAS SEEN AND EXAMINED BY ME WITH THE HOUSESTAFF AND STROKE TEAM DURING MORNING ROUNDS.   HPI:  85 yo right-handed Djiboutian speaking woman who presents to [facility] as a transfer from [facility] for new and acute right-sided weakness and language disturbance (LWK 5-530pm, 11/2/18). ROS also revealed non-verbal, unresponsiveness. Otherwise limited due to patient's current cognitive state. Pertinent hx includes HTN and "heart condition" (unspecified w/ family at bedside). Patient's initial NIHSS score worsened to 28 upon arrival to [facility], was given tPA (1859pm, 11/2/18) and subsequently transferred for possible endovascular intervention. Patient's on arrival to [facility] ER NIHSS 22, pre-MRS 0, no M1 occlusion.     SUBJECTIVE: No events overnight.  No new neurologic complaints.      aspirin  chewable 81 milliGRAM(s) Oral daily  enoxaparin Injectable 40 milliGRAM(s) SubCutaneous daily  influenza   Vaccine 0.5 milliLiter(s) IntraMuscular once  sodium chloride 0.9%. 1000 milliLiter(s) IV Continuous <Continuous>    PHYSICAL EXAM:   Vital Signs Last 24 Hrs  T(C): 36.7 (06 Nov 2018 08:00), Max: 37.1 (05 Nov 2018 16:00)  T(F): 98.1 (06 Nov 2018 08:00), Max: 98.7 (05 Nov 2018 16:00)  HR: 64 (06 Nov 2018 10:00) (54 - 68)  BP: 122/45 (06 Nov 2018 10:00) (112/99 - 149/58)  BP(mean): 69 (06 Nov 2018 10:00) (69 - 105)  RR: 18 (06 Nov 2018 10:00) (13 - 19)  SpO2: 98% (06 Nov 2018 10:00) (97% - 100%)    General: No acute distress  HEENT:  right gaze palsy, RHH  Abdomen: Soft, nontender, nondistended   Extremities: No edema    NEUROLOGICAL EXAM:  Mental status: Awake, alert, mute, globally aphasic, able to follow some simple commands, unable to mimic, left hemineglect   Cranial Nerves: right facial droop, right gaze palsy, RHH  Motor exam: right hypotonic hemiplegia and was able to move LUE and LLE against gravity without noticeable weakness   Sensation: grimaces to noxious stimuli  Coordination/ Gait: unable to participate with exam due to aphasic     LABS:                        10.7   3.3   )-----------( 167      ( 06 Nov 2018 04:40 )             30.8    11-06    142  |  113<H>  |  12  ----------------------------<  89  3.6   |  18<L>  |  0.67    Ca    8.8      06 Nov 2018 04:40      Hemoglobin A1C, Whole Blood: 5.4 % (11-03 @ 05:06)      IMAGING: Reviewed by me.     Brain MRI (11/03/18) left CYNDEE and left MCA territory infarcts with small amount of cortical hemorrhage in the left MCA infarct    Head CT, CT perfusion, CTA head/Neck  (11/03/18) Atrophy. 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     CTA demonstrates decreased number of left middle cerebral artery branches in the sylvian fissure compared with the right consistent with a distal branch occlusion. There is a left distal A2 occlusion.

## 2018-11-06 NOTE — CONSULT NOTE ADULT - PROBLEM SELECTOR RECOMMENDATION 9
Monitor on tele   Check thyroid panel   no AV yulisa blockers   Check TTE
Acute infarct in the left anterior and middle cerebral   artery   Right hemiparesis with severe aphasia

## 2018-11-06 NOTE — PROGRESS NOTE ADULT - SUBJECTIVE AND OBJECTIVE BOX
Subjective: Patient seen and examined. No new events except as noted.   remains in stroke unit     REVIEW OF SYSTEMS:    Unable to obtain   MEDICATIONS:  MEDICATIONS  (STANDING):  aspirin  chewable 81 milliGRAM(s) Oral daily  enoxaparin Injectable 40 milliGRAM(s) SubCutaneous daily  influenza   Vaccine 0.5 milliLiter(s) IntraMuscular once  sodium chloride 0.9%. 1000 milliLiter(s) (75 mL/Hr) IV Continuous <Continuous>      PHYSICAL EXAM:  T(C): 36.7 (11-06-18 @ 08:00), Max: 37.1 (11-05-18 @ 16:00)  HR: 64 (11-06-18 @ 10:00) (54 - 68)  BP: 122/45 (11-06-18 @ 10:00) (112/99 - 149/58)  RR: 18 (11-06-18 @ 10:00) (13 - 19)  SpO2: 98% (11-06-18 @ 10:00) (97% - 100%)  Wt(kg): --  I&O's Summary    05 Nov 2018 07:01  -  06 Nov 2018 07:00  --------------------------------------------------------  IN: 1000 mL / OUT: 1600 mL / NET: -600 mL          Appearance: NAD nonverbal aphasic	  HEENT:   Normal oral mucosa, PERRL, EOMI	  Lymphatic: No lymphadenopathy , no edema  Cardiovascular: Normal S1 S2, No JVD, No murmurs , Peripheral pulses palpable 2+ bilaterally  Respiratory: Lungs clear to auscultation, normal effort 	  Gastrointestinal:  Soft, Non-tender, + BS	  Skin: No rashes, No ecchymoses, No cyanosis, warm to touch  Musculoskeletal: decreased  range of motion and  strength  Psychiatry:  Mood & affect appropriate  Ext: No edema  NEUROLOGICAL EXAM:  Mental status: Awake, alert, mute, globally aphasic, able to follow some simple commands, unable to mimic, left hemineglect   Cranial Nerves: right facial droop, right gaze palsy,  Motor exam: right hypotonic hemiplegia and was able to move LUE and LLE against gravity without noticeable weakness   Sensation: grimaces to noxious stimuli  Coordination/ Gait: unable to participate with exam due to aphasic         LABS:    CARDIAC MARKERS:                                10.7   3.3   )-----------( 167      ( 06 Nov 2018 04:40 )             30.8     11-06    142  |  113<H>  |  12  ----------------------------<  89  3.6   |  18<L>  |  0.67    Ca    8.8      06 Nov 2018 04:40      proBNP:   Lipid Profile:   HgA1c:   TSH:             TELEMETRY: SR	    ECG:  	  RADIOLOGY: < from: MR Head No Cont (11.03.18 @ 21:14) >    EXAM:  MR BRAIN                            PROCEDURE DATE:  11/03/2018            INTERPRETATION:    CLINICAL INDICATION: Acute left middle cerebral artery infarct      Magnetic resonance imaging of the brain was carried out with transaxial   SPGR,FLAIR, fast spin echo T2 weighted images, axial susceptibility   weighted series, diffusion weighted series and sagittal T1 weighted   series on a 1.5 Glenys magnet.    Comparison is made with the prior CT/CTA of 11/2/2018    Diffusion-weighted imaging is positive for an acute infarct in the left   frontal parasagittal and left frontal parietal cortical distributions   consistent with acute infarct in the left anterior and middle cerebral   artery territories. There is mild sulcal effacement. There is a small   amount of petechial hemorrhage along the cortex seen only on the   susceptibility weighted series.    Small vessel white matter ischemic changes are also noted.    There is a short air-fluid level in the sphenoid sinus.    There has been previous bilateral lens replacement surgery.        IMPRESSION: Acute infarcts in the left anterior and middle cerebral   artery territory with restricted diffusion and a small amount of   hemorrhage on susceptibility series..    Dr. Cuong magallonthis with Dr. Moy on 11/4/2018 at 4:00 AM.                        URIEL DESAI M.D., ATTENDING RADIOLOGIST  This document has been electronically signed. Nov 4 2018  1:42PM                < from: Xray Modified Barium Swallow (11.05.18 @ 11:40) >    EXAM:  MODIFIED BARIUM SWALLOW                            PROCEDURE DATE:  11/05/2018            INTERPRETATION:  CLINICAL INFORMATION: Dysphagia.    TECHNIQUE: A Modified Barium Swallow was performed. The patient was given   oral contrast in varied consistencies to swallow and fluoroscopy was   performed with a speech therapist from the Speech and Hearing Department.     FLUORO TIME: 55.9 seconds     IMPRESSION:         Evidence of aspiration.    For further information and recommendations, please refer to the speech   pathologist final report which is available for review in the electronic   medical record.                DANIE SERRANO M.D., RADIOLOGY RESIDENT  This document has been electronically signed.  APRIL DIAZ M.D., ATTENDING RADIOLOGIST  This document has been electronically signed. Nov 5 2018  4:14PM                < end of copied text >      DIAGNOSTIC TESTING:  [ ] Echocardiogram:  [ ]  Catheterization:  [ ] Stress Test:    OTHER:

## 2018-11-06 NOTE — CONSULT NOTE ADULT - SUBJECTIVE AND OBJECTIVE BOX
HPI:  87 yo right-handed French speaking woman who presents to [facility] as a transfer from [facility] for new and acute right-sided weakness and language disturbance (LWK 5-530pm, 11/2/18). ROS also revealed non-verbal, unresponsiveness. Otherwise limited due to patient's current cognitive state. Pertinent hx includes HTN and "heart condition" (unspecified w/ family at bedside). Patient's initial NIHSS score worsened to 28 upon arrival to [facility], was given tPA (1859pm, 11/2/18) and subsequently transferred for possible endovascular intervention. Patient's current NIHSS 22, pre-MRS 0, no M1 occlusion. (02 Nov 2018 22:01)    PERTINENT PM/SXH:   HTN (hypertension)    S/P appendectomy    FAMILY HISTORY:  No pertinent family history in first degree relatives    ITEMS NOT CHECKED ARE NOT PRESENT    SOCIAL HISTORY:   Significant other/partner:  [x ]  Children:  [ ]  Sikhism/Spirituality:  Substance hx:  [ ]   Tobacco hx:  [ ]   Alcohol hx: [ ]   Home Opioid hx:  [ ] I-Stop Reference No:  Living Situation: [ x]Home  [ ]Long term care  [ ]Rehab [ ]Other    ADVANCE DIRECTIVES:    DNR  MOLST  [ ]  Living Will  [ ]   DECISION MAKER(s):  [ ] Health Care Proxy(s)  [ x] Surrogate(s)  [ ] Guardian           Name(s): Phone Number(s):  8 Children:   Ivone Blackburn ,  point person [phone]  Taylor Biswas, Rene Saldana, Venkatesh Saldana, Jian Saldana ([place]), Michele Saldana ([place]) Bernadette Alvarez ([place]), Sharifa Saldana    BASELINE (I)ADL(s) (prior to admission):  Rowena: [ ]Total  [x ] Moderate [ ]Dependent    Allergies    No Known Allergies    Intolerances    MEDICATIONS  (STANDING):  aspirin  chewable 81 milliGRAM(s) Oral daily  enoxaparin Injectable 40 milliGRAM(s) SubCutaneous daily  influenza   Vaccine 0.5 milliLiter(s) IntraMuscular once  sodium chloride 0.9%. 1000 milliLiter(s) (75 mL/Hr) IV Continuous <Continuous>    MEDICATIONS  (PRN):    PRESENT SYMPTOMS: [x ]Unable to obtain due to poor mentation   comfortable appearing in NAD  Source if other than patient:  [x ]Family   [ ]Team     Pain (Impact on QOL):    Location -         Minimal acceptable level (0-10 scale):                    Aggrevating factors -  Quality -  Radiation -  Severity (0-10 scale) -    Timing -    PAIN AD Score: 0    [email or web address] (press ctrl +  left click to view)    Dyspnea:                           [ ]Mild [ ]Moderate [ ]Severe  Anxiety:                             [ ]Mild [ ]Moderate [ ]Severe  Fatigue:                             [ ]Mild [ ]Moderate [ ]Severe  Nausea:                             [ ]Mild [ ]Moderate [ ]Severe  Loss of appetite:              [ ]Mild [ ]Moderate [ ]Severe  Constipation:                    [ ]Mild [ ]Moderate [ ]Severe    Other Symptoms:  [ x]All other review of systems negative     Karnofsky Performance Score/Palliative Performance Status Version 2:    30     %  PHYSICAL EXAM:  Vital Signs Last 24 Hrs  T(C): 36.7 (06 Nov 2018 08:00), Max: 37.1 (05 Nov 2018 16:00)  T(F): 98.1 (06 Nov 2018 08:00), Max: 98.7 (05 Nov 2018 16:00)  HR: 64 (06 Nov 2018 10:00) (54 - 68)  BP: 122/45 (06 Nov 2018 10:00) (112/99 - 149/58)  BP(mean): 69 (06 Nov 2018 10:00) (69 - 105)  RR: 18 (06 Nov 2018 10:00) (13 - 19)  SpO2: 98% (06 Nov 2018 10:00) (97% - 100%) I&O's Summary    05 Nov 2018 07:01  -  06 Nov 2018 07:00  --------------------------------------------------------  IN: 1000 mL / OUT: 1600 mL / NET: -600 mL    GENERAL:  [x ]Alert  [x ]Oriented    [ ]Lethargic  [ ]Cachexia  [ ]Unarousable  [ ]Verbal  [x ]Non-Verbal  Behavioral:   [ ] Anxiety  [ ] Delirium [ ] Agitation [ ] Other  HEENT:  [x ]Normal   [ ]Dry mouth   [ ]ET Tube/Trach  [ ]Oral lesions  PULMONARY:   [x ]Clear [ ]Tachypnea  [ ]Audible excessive secretions   [ ]Rhonchi        [ ]Right [ ]Left [ ]Bilateral  [ ]Crackles        [ ]Right [ ]Left [ ]Bilateral  [ ]Wheezing     [ ]Right [ ]Left [ ]Bilateral  CARDIOVASCULAR:    [ x]Regular [ ]Irregular [ ]Tachy  [ ]Cachorro [ ]Murmur [ ]Other  GASTROINTESTINAL:  [x ]Soft  [ ]Distended   x[ ]+BS  [x ]Non tender [ ]Tender  [ ]PEG [ ]OGT/ NGT  Last BM:   GENITOURINARY:  [ ]Normal [x ] Incontinent   [ ]Oliguria/Anuria   [ ]Matias  MUSCULOSKELETAL:   [ ]Normal   x[ ]Weakness  [ ]Bed/Wheelchair bound [ ]Edema  NEUROLOGIC:   [ ]No focal deficits  [x ] Cognitive impairment  [ ] Dysphagia [ ]Dysarthria [ ] Paresis [ ]Other   SKIN:   [x ]Normal   [ ]Pressure ulcer(s)  [ ]Rash    CRITICAL CARE:  [ ] Shock Present  [ ]Septic [ ]Cardiogenic [ x]Neurologic [ ]Hypovolemic  [ ]  Vasopressors [ ]  Inotropes   [ ] Respiratory failure present  [ ] Acute  [ ] Chronic [ ] Hypoxic  [ ] Hypercarbic [ ] Other  [ ] Other organ failure     LABS:                        10.7   3.3   )-----------( 167      ( 06 Nov 2018 04:40 )             30.8   11-06    142  |  113<H>  |  12  ----------------------------<  89  3.6   |  18<L>  |  0.67    Ca    8.8      06 Nov 2018 04:40          RADIOLOGY & ADDITIONAL STUDIES:    INTERPRETATION:    CLINICAL INDICATION: Acute left middle cerebral artery infarct      Magnetic resonance imaging of the brain was carried out with transaxial   SPGR,FLAIR, fast spin echo T2 weighted images, axial susceptibility   weighted series, diffusion weighted series and sagittal T1 weighted   series on a 1.5 Glenys magnet.    Comparison is made with the prior CT/CTA of 11/2/2018    Diffusion-weighted imaging is positive for an acute infarct in the left   frontal parasagittal and left frontal parietal cortical distributions   consistent with acute infarct in the left anterior and middle cerebral   artery territories. There is mild sulcal effacement. There is a small   amount of petechial hemorrhage along the cortex seen only on the   susceptibility weighted series.    Small vessel white matter ischemic changes are also noted.    There is a short air-fluid level in the sphenoid sinus.    There has been previous bilateral lens replacement surgery.        IMPRESSION: Acute infarcts in the left anterior and middle cerebral   artery territory with restricted diffusion and a small amount of   hemorrhage on susceptibility series..      PROTEIN CALORIE MALNUTRITION:   [ ] PPSV2 < or = to 30% [ ] significant weight loss  [ ] poor nutritional intake [ ] catabolic state [ ] anasarca     Albumin, Serum: 3.8 g/dL (11-02-18 @ 21:39)  Artificial Nutrition [ ]     REFERRALS:   [ ]Chaplaincy  [ ] Hospice  [ ]Child Life  [ ]Social Work  [ ]Case management [ ]Holistic Therapy   Goals of Care Discussion Document:

## 2018-11-06 NOTE — CONSULT NOTE ADULT - PROBLEM SELECTOR RECOMMENDATION 4
Pt has 8 children, no HCP documentation.   Sydney Blackburn, point person, [phone].  Family meeting set up for Thursday 2 pm with the children that are in [place].  Discussion will surround, advance directives, nutritional goals of care, MOLST

## 2018-11-06 NOTE — CONSULT NOTE ADULT - ASSESSMENT
85 yo female admitted to Stroke unit with acute R MCA CVA and found to be bradycardic
87 y/o female admitted with right sided weakness and aphasia in setting o f large MCA infarct with noted L internal carotid occlusion not a candidate for embolectomy, called for goals of care

## 2018-11-06 NOTE — PROGRESS NOTE ADULT - ASSESSMENT
86 years old woman with multiple vascular risk factors, including age and HTN was transferred from OSH to [facility] for acute onset of right-sided weakness. She was noted to have developed acute onset of right-sided weakness and language disturbance. She was initially evaluated at [facility] through tele-stroke and was treated with IV tPA. She was subsequently transferred to [facility] for further management. MRI brain showed left CYNDEE and MCA distribution infarct with mild hemorrhagic transformation (HI 1). Impression: Cerebral embolism with cerebral infarction. Left ICA (MCA+CYNDEE) distribution stroke - likely etiology embolic stroke of unknown source    NEURO: Neurologically without acute change- remains with global aphasia and right hemiplegia. Continue close monitoring for neurologic deterioration, permissive HTN with slow titration to normotensive, Admission -  will d/c Lipitor, ischemic infarct not related to atherosclerosis,  MRI Brain w/o, MRA Head w/o and Neck w/contrast results as above. Pending Physical therapy recommend AR. Palliative team contacted to discuss plan of care (MOLST Form)    ANTITHROMBOTIC THERAPY: ASA for second stroke prevention    PULMONARY: Protecting airway, saturating well     CARDIOVASCULAR: No TTE needed. Cardiac monitoring no events;                           SBP goal: gradual normotension    GASTROINTESTINAL: dysphagia screen failed. Pt made NPO, s/p MBS: with recommendations for Dysphagia 1 diet, tolerating well     Diet: Dysphagia 1 honey    RENAL: BUN/Cr without acute change, urinary retention: 600ml retention, straight cath PRN     Na Goal: Greater than 135     Matias: N    HEMATOLOGY: H/H without acute change, Platelets 167     DVT ppx:  LMWH     ID: afebrile, Leukopenia, WBC: 3.3     OTHER: above-mentioned plan was discussed with patient and available family members at bedside in detail. All the questions were answered and concerns were addressed.    DISPOSITION: Poor prognosis for meaningful recovery. Discuss goals of care with family. Palliative consult. Rehab once workup is complete    CORE MEASURES:        Admission NIHSS: 22     TPA: YES       LDL/HDL: 104/54     Depression Screen: NA due to global aphasia     Statin Therapy: yes     Dysphagia Screen: FAIL     Smoking  NO      Afib NO     Stroke Education  YES to family 86 years old woman with multiple vascular risk factors, including age and HTN was transferred from OS to [facility] for acute onset of right-sided weakness. She was noted to have developed acute onset of right-sided weakness and language disturbance. She was initially evaluated at [facility] through tele-stroke and was treated with IV tPA. MRI brain showed left CYNDEE and MCA distribution infarct with mild hemorrhagic transformation (HI 1). Impression: Cerebral embolism with cerebral infarction. Left ICA (MCA+CYNDEE) distribution stroke - likely etiology embolic stroke of unknown source.    NEURO: Neurologically without acute change- remains with global aphasia and right hemiplegia. Bedbound with functional quadriplegia Continue monitoring for neurologic deterioration, permissive HTN with slow titration to normotensive, Admission -  will d/c Lipitor, ischemic infarct not related to atherosclerosis,  MRI Brain w/o, MRA Head w/o and Neck w/contrast results as above. Pending Physical therapy recommend AR. Palliative team contacted to discuss goals of care (MOLST Form, DNR, hospice)    ANTITHROMBOTIC THERAPY: ASA for second stroke prevention.    PULMONARY: Protecting airway, saturating well     CARDIOVASCULAR: No TTE needed. Cardiac monitoring no events;                           SBP goal: gradual normotension    GASTROINTESTINAL: dysphagia screen failed. Pt made NPO, s/p MBS: with recommendations for Dysphagia 1 diet, tolerating well     Diet: Dysphagia 1 honey    RENAL: BUN/Cr without acute change, urinary retention: 600ml retention, straight cath PRN     Na Goal: Greater than 135     Matias: N    HEMATOLOGY: H/H without acute change, Platelets 167     DVT ppx:  LMWH     ID: afebrile, Leukopenia, WBC: 3.3     OTHER: above-mentioned plan was discussed with patient and available family members at bedside in detail. All the questions were answered and concerns were addressed.    DISPOSITION: Poor prognosis for meaningful recovery. Discuss goals of care with family. Palliative consult. Rehab once workup is complete    CORE MEASURES:        Admission NIHSS: 22     TPA: YES       LDL/HDL: 104/54     Depression Screen: NA due to global aphasia     Statin Therapy: yes     Dysphagia Screen: FAIL     Smoking  NO      Afib NO     Stroke Education  YES to family

## 2018-11-06 NOTE — PROGRESS NOTE ADULT - SUBJECTIVE AND OBJECTIVE BOX
Cc: R weakness, aphasia    Admission HPI:  Patient tolerating therapies.  Max A with transfers and gait    MEDICATIONS  (STANDING):  aspirin  chewable 81 milliGRAM(s) Oral daily  enoxaparin Injectable 40 milliGRAM(s) SubCutaneous daily  influenza   Vaccine 0.5 milliLiter(s) IntraMuscular once  sodium chloride 0.9%. 1000 milliLiter(s) (75 mL/Hr) IV Continuous <Continuous>    MEDICATIONS  (PRN):    Vital Signs Last 24 Hrs  T(C): 36.7 (06 Nov 2018 08:00), Max: 37.1 (05 Nov 2018 16:00)  T(F): 98.1 (06 Nov 2018 08:00), Max: 98.7 (05 Nov 2018 16:00)  HR: 64 (06 Nov 2018 10:00) (54 - 68)  BP: 122/45 (06 Nov 2018 10:00) (112/99 - 155/53)  BP(mean): 69 (06 Nov 2018 10:00) (69 - 105)  RR: 18 (06 Nov 2018 10:00) (13 - 19)  SpO2: 98% (06 Nov 2018 10:00) (97% - 100%)    PHYSICAL EXAM  Constitutional - NAD, Comfortable  HEENT - NCAT, EOMI  Extremities - No C/C/E, No calf tenderness   Neurologic Exam -                    Cognitive - Awake, Alert     Communication - + expressive aphasia, follows simple instructions in Sammarinese     Motor - Right side 0/5     MAS 1+ extensor tone on the RLE        Psychiatric -Affect WNL                          10.7   3.3   )-----------( 167      ( 06 Nov 2018 04:40 )             30.8     11-06    142  |  113<H>  |  12  ----------------------------<  89  3.6   |  18<L>  |  0.67    Ca    8.8      06 Nov 2018 04:40        Impression:  85 yo with CVA with Right hemiparesis, aphasia, gait dysfunction      Plan:  PT- ROM, Bed Mob, Transfers, Amb w AD and bracing as needed  OT- ADLs, bracing  SLP- Dysphagia eval and treat  Prec- Falls, Cardiac  DVT Prophylaxis- lovenox  Skin- Turn q2 h  Dispo- Acute Rehab- can tolerate 3h/d PT/OT/SLP and requires daily physician visits  ELOS 5 wks  LTG- Min A with transfers/gait w ADs   Dispo from rehab- d/w with patients family and they are available to help and assist 24/7  Discussed with family functional prognosis.

## 2018-11-07 PROCEDURE — 99233 SBSQ HOSP IP/OBS HIGH 50: CPT

## 2018-11-07 RX ADMIN — ENOXAPARIN SODIUM 40 MILLIGRAM(S): 100 INJECTION SUBCUTANEOUS at 13:24

## 2018-11-07 RX ADMIN — Medication 81 MILLIGRAM(S): at 13:24

## 2018-11-07 NOTE — PROGRESS NOTE ADULT - SUBJECTIVE AND OBJECTIVE BOX
THE PATIENT WAS SEEN AND EXAMINED BY ME WITH THE HOUSESTAFF AND STROKE TEAM DURING MORNING ROUNDS.   HPI:  87 yo right-handed Togolese speaking woman who presented to [facility] as a transfer from [facility] for new and acute right-sided weakness and language disturbance (LWK 5-530pm, 11/2/18). ROS also revealed non-verbal, unresponsiveness. Otherwise limited due to patient's current cognitive state. Pertinent hx includes HTN and "heart condition" (unspecified w/ family at bedside). Patient's initial NIHSS score worsened to 28 upon arrival to [facility], was given tPA (1859pm, 11/2/18) and subsequently transferred for possible endovascular intervention. Patient's on arrival to [facility] ER NIHSS 22, pre-MRS 0, no M1 occlusion.     SUBJECTIVE: No events overnight.  No new neurologic complaints.      aspirin  chewable 81 milliGRAM(s) Oral daily  enoxaparin Injectable 40 milliGRAM(s) SubCutaneous daily  influenza   Vaccine 0.5 milliLiter(s) IntraMuscular once  sodium chloride 0.9%. 1000 milliLiter(s) IV Continuous <Continuous>    PHYSICAL EXAM:   Vital Signs Last 24 Hrs  T(C): 36.9 (07 Nov 2018 04:45), Max: 37.3 (06 Nov 2018 16:05)  T(F): 98.5 (07 Nov 2018 04:45), Max: 99.2 (06 Nov 2018 16:05)  HR: 58 (07 Nov 2018 04:45) (50 - 86)  BP: 141/65 (07 Nov 2018 04:45) (122/45 - 142/71)  BP(mean): 83 (06 Nov 2018 14:00) (69 - 84)  RR: 18 (07 Nov 2018 04:45) (17 - 18)  SpO2: 97% (07 Nov 2018 04:45) (95% - 100%)    General: No acute distress  HEENT:  right gaze palsy, RHH  Abdomen: Soft, nontender, nondistended   Extremities: No edema    NEUROLOGICAL EXAM:  Mental status: Awake, alert, mute, globally aphasic, able to follow some simple commands, unable to mimic, left hemineglect   Cranial Nerves: right facial droop, right gaze palsy, RHH  Motor exam: right hypotonic hemiplegia and was able to move LUE and LLE against gravity without noticeable weakness   Sensation: grimaces to noxious stimuli  Coordination/ Gait: unable to participate with exam due to aphasic     LABS:                        10.7   3.3   )-----------( 167      ( 06 Nov 2018 04:40 )             30.8    11-06    142  |  113<H>  |  12  ----------------------------<  89  3.6   |  18<L>  |  0.67    Ca    8.8      06 Nov 2018 04:40    Hemoglobin A1C, Whole Blood: 5.4 % (11-03 @ 05:06)    IMAGING: Reviewed by me.     Brain MRI (11/03/18) left CYNDEE and left MCA territory infarcts with small amount of cortical hemorrhage in the left MCA infarct    Head CT, CT perfusion, CTA head/Neck  (11/03/18) Atrophy. 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     CTA demonstrates decreased number of left middle cerebral artery branches in the sylvian fissure compared with the right consistent with a distal branch occlusion. There is a left distal A2 occlusion.

## 2018-11-07 NOTE — PROGRESS NOTE ADULT - ASSESSMENT
85 y/o female admitted with right sided weakness and aphasia in setting o f large MCA infarct with noted L internal carotid occlusion not a candidate for embolectomy, called for goals of care

## 2018-11-07 NOTE — PROGRESS NOTE ADULT - SUBJECTIVE AND OBJECTIVE BOX
Subjective: Patient seen and examined. No new events except as noted.     REVIEW OF SYSTEMS:    Unable to obtain     MEDICATIONS:  MEDICATIONS  (STANDING):  aspirin  chewable 81 milliGRAM(s) Oral daily  enoxaparin Injectable 40 milliGRAM(s) SubCutaneous daily  influenza   Vaccine 0.5 milliLiter(s) IntraMuscular once      PHYSICAL EXAM:  T(C): 36.9 (11-07-18 @ 04:45), Max: 37.3 (11-06-18 @ 16:05)  HR: 58 (11-07-18 @ 04:45) (50 - 86)  BP: 141/65 (11-07-18 @ 04:45) (129/68 - 142/71)  RR: 18 (11-07-18 @ 04:45) (18 - 18)  SpO2: 97% (11-07-18 @ 04:45) (95% - 100%)  Wt(kg): --  I&O's Summary    06 Nov 2018 07:01  -  07 Nov 2018 07:00  --------------------------------------------------------  IN: 525 mL / OUT: 1175 mL / NET: -650 mL          Appearance: NAD aphasic 	  HEENT:   Normal oral mucosa, PERRL, EOMI	  Lymphatic: No lymphadenopathy , no edema  Cardiovascular: regular S1 S2, No JVD, No murmurs , Peripheral pulses palpable 2+ bilaterally  Respiratory: Lungs clear to auscultation, normal effort 	  Gastrointestinal:  Soft, Non-tender, + BS	  Skin: No rashes, No ecchymoses, No cyanosis, warm to touch  Musculoskeletal: decreased  range of motion and strength  Psychiatry:  Mood & affect appropriate  Ext: No edema  NEUROLOGICAL EXAM:  Mental status: Awake, alert, mute, globally aphasic, able to follow some simple commands, unable to mimic, left hemineglect   Cranial Nerves: right facial droop, right gaze palsy, [facility]  Motor exam: right hypotonic hemiplegia and was able to move LUE and LLE against gravity without noticeable weakness   Sensation: grimaces to noxious stimuli  Coordination/ Gait: unable to participate with exam due to aphasic       LABS:    CARDIAC MARKERS:                                10.7   3.3   )-----------( 167      ( 06 Nov 2018 04:40 )             30.8     11-06    142  |  113<H>  |  12  ----------------------------<  89  3.6   |  18<L>  |  0.67    Ca    8.8      06 Nov 2018 04:40      proBNP:   Lipid Profile:   HgA1c:   TSH:             TELEMETRY: 	  AF  ECG:  	  RADIOLOGY:   DIAGNOSTIC TESTING:  [ ] Echocardiogram:  [ ]  Catheterization:  [ ] Stress Test:    OTHER:

## 2018-11-07 NOTE — PROGRESS NOTE ADULT - SUBJECTIVE AND OBJECTIVE BOX
SUBJECTIVE AND OBJECTIVE:  Awake , alert, non verbal.  Daughter Taylor and son Venkatesh at bedside   INTERVAL HPI/OVERNIGHT EVENTS:    DNR on chart:   Allergies    No Known Allergies    Intolerances    MEDICATIONS  (STANDING):  aspirin  chewable 81 milliGRAM(s) Oral daily  enoxaparin Injectable 40 milliGRAM(s) SubCutaneous daily  influenza   Vaccine 0.5 milliLiter(s) IntraMuscular once    MEDICATIONS  (PRN):      ITEMS UNCHECKED ARE NOT PRESENT    PRESENT SYMPTOMS: [x ]Unable to obtain due to poor mentation   Source if other than patient:  [ ]Family   [ ]Team     Pain (Impact on QOL):    Location:  Minimal acceptable level (0-10 scale):            Aggravating factors:  Quality:  Radiation:  Severity (0-10 scale):    Timing:    Dyspnea:                           [ ]Mild [ ]Moderate [ ]Severe  Anxiety:                             [ ]Mild [ ]Moderate [ ]Severe  Fatigue:                             [ ]Mild [ ]Moderate [ ]Severe  Nausea:                             [ ]Mild [ ]Moderate [ ]Severe  Loss of appetite:              [ ]Mild [ ]Moderate [ ]Severe  Constipation:                    [ ]Mild [ ]Moderate [ ]Severe    PAIN AD Score:	0  [email or web address] (Ctrl + left click to view)    Other Symptoms:  [ ]All other review of systems negative     Karnofsky Performance Score/Palliative Performance Status Version 2:    30     %    http://palliative.info/resource_material/[email or web address].pdf  PHYSICAL EXAM:  Vital Signs Last 24 Hrs  T(C): 36.9 (07 Nov 2018 04:45), Max: 37.3 (06 Nov 2018 16:05)  T(F): 98.5 (07 Nov 2018 04:45), Max: 99.2 (06 Nov 2018 16:05)  HR: 58 (07 Nov 2018 04:45) (50 - 86)  BP: 141/65 (07 Nov 2018 04:45) (129/68 - 142/71)  BP(mean): 83 (06 Nov 2018 14:00) (83 - 83)  RR: 18 (07 Nov 2018 04:45) (18 - 18)  SpO2: 97% (07 Nov 2018 04:45) (95% - 100%) I&O's Summary    06 Nov 2018 07:01  -  07 Nov 2018 07:00  --------------------------------------------------------  IN: 525 mL / OUT: 1175 mL / NET: -650 mL     GENERAL:  [x ]Alert  [ ]Oriented x   [ ]Lethargic  [ ]Cachexia  [ ]Unarousable  [ ]Verbal  [x ]Non-Verbal    Behavioral:   [ ] Anxiety  [ ] Delirium [ ] Agitation [ ] Other    HEENT:  [ ]Normal   [ x]Dry mouth   [ ]ET Tube/Trach  [ ]Oral lesions    PULMONARY:   [ x]Clear [ ]Tachypnea  [ ]Audible excessive secretions   [ ]Rhonchi        [ ]Right [ ]Left [ ]Bilateral  [ ]Crackles        [ ]Right [ ]Left [ ]Bilateral  [ ]Wheezing     [ ]Right [ ]Left [ ]Bilateral    CARDIOVASCULAR:    [ ]Regular [x ]Irregular [ ]Tachy  [ ]Cachorro [ ]Murmur [ ]Other    GASTROINTESTINAL:  [x ]Soft  [ ]Distended   [ x]+BS  [ ]Non tender [ ]Tender  [ ]PEG [ ]OGT/ NGT   Last BM:    GENITOURINARY:  [ ]Normal [ ] Incontinent   [ ]Oliguria/Anuria   [ ]Matias    MUSCULOSKELETAL:   [ ]Normal   [x ]Weakness  [ x]Bed/Wheelchair bound [ ]Edema    NEUROLOGIC:   [ ]No focal deficits  [x ] Cognitive impairment  [ ] Dysphagia [ ]Dysarthria [ ] Paresis [ ]Other     SKIN:   [x ]Normal   [ ]Pressure ulcer(s)  [ ]Rash    CRITICAL CARE:  [ ] Shock Present  [ ]Septic [ ]Cardiogenic [ ]Neurologic [ ]Hypovolemic  [ ]  Vasopressors [ ]  Inotropes   [ ] Respiratory failure present  [ ] Acute  [ ] Chronic [ ] Hypoxic  [ ] Hypercarbic [ ] Other  [ ] Other organ failure     LABS:                        10.7   3.3   )-----------( 167      ( 06 Nov 2018 04:40 )             30.8   11-06    142  |  113<H>  |  12  ----------------------------<  89  3.6   |  18<L>  |  0.67    Ca    8.8      06 Nov 2018 04:40          RADIOLOGY & ADDITIONAL STUDIES:      INTERPRETATION:    CLINICAL INDICATION: Acute left middle cerebral artery infarct      Magnetic resonance imaging of the brain was carried out with transaxial   SPGR,FLAIR, fast spin echo T2 weighted images, axial susceptibility   weighted series, diffusion weighted series and sagittal T1 weighted   series on a 1.5 Glenys magnet.    Comparison is made with the prior CT/CTA of 11/2/2018    Diffusion-weighted imaging is positive for an acute infarct in the left   frontal parasagittal and left frontal parietal cortical distributions   consistent with acute infarct in the left anterior and middle cerebral   artery territories. There is mild sulcal effacement. There is a small   amount of petechial hemorrhage along the cortex seen only on the   susceptibility weighted series.    Small vessel white matter ischemic changes are also noted.    There is a short air-fluid level in the sphenoid sinus.    There has been previous bilateral lens replacement surgery.        IMPRESSION: Acute infarcts in the left anterior and middle cerebral   artery territory with restricted diffusion and a small amount of   hemorrhage on susceptibility series..      Protein Calorie Malnutrition Present: [ ] yes [ ] no  [ ] PPSV2 < or = 30%  [ ] significant weight loss [ ] poor nutritional intake [ ] anasarca [ ] catabolic state Albumin, Serum: 3.8 g/dL (11-02-18 @ 21:39)  Artificial Nutrition [ ]     REFERRALS:   [ ]Chaplaincy  [ ] Hospice  [ ]Child Life  [ ]Social Work  [x ]Case management [ ]Holistic Therapy   Goals of Care Document:

## 2018-11-07 NOTE — PROGRESS NOTE ADULT - ASSESSMENT
86 year old woman with multiple vascular risk factors, including age and HTN was transferred from [facility] to [facility] for acute onset of right-sided weakness. She was noted to have developed acute onset of right-sided weakness and language disturbance. She was initially evaluated at [facility] through tele-stroke and was treated with IV tPA. MRI brain showed left CYNDEE and MCA distribution infarct with mild hemorrhagic transformation (HI 1).     Impression: Cerebral embolism with cerebral infarction. Left ICA (MCA+CYNDEE) distribution stroke - likely etiology embolic stroke of unknown source.    NEURO: Neurologically without acute change- remains with global aphasia and right hemiplegia. Bedbound with functional quadriplegia Continue monitoring for neurologic deterioration, permissive HTN with slow titration to normotensive, Admission -  will d/c Lipitor, ischemic infarct not related to atherosclerosis,  MRI Brain w/o, MRA Head w/o and Neck w/contrast results as above. Pending Physical therapy recommend AR. Palliative team contacted to discuss goals of care (MOLST Form, DNR, hospice)    ANTITHROMBOTIC THERAPY: ASA for second stroke prevention.    PULMONARY: Protecting airway, saturating well     CARDIOVASCULAR: No TTE needed. Cardiac monitoring no events;                           SBP goal: gradual normotension    GASTROINTESTINAL: dysphagia screen failed. Pt made NPO, s/p MBS: with recommendations for Dysphagia 1 diet, tolerating well     Diet: Dysphagia 1 honey    RENAL: BUN/Cr previously without acute change, urinary retention: 600ml retention, straight cath PRN     Na Goal: Greater than 135     Matias: N    HEMATOLOGY: no signs or symptoms of bleeding     DVT ppx:  LMWH     ID: afebrile, no overt sign of infection    OTHER: above-mentioned plan was discussed with patient and available family members at bedside in detail. All the questions were answered and concerns were addressed.    DISPOSITION: Poor prognosis for meaningful recovery. Discuss goals of care with family. Palliative consult. Rehab once workup is complete if within GOC.     CORE MEASURES:        Admission NIHSS: 22     TPA: YES       LDL/HDL: 104/54     Depression Screen: NA due to global aphasia     Statin Therapy: yes     Dysphagia Screen: FAIL     Smoking  NO      Afib NO     Stroke Education  YES to family 86 year old woman with multiple vascular risk factors, including age and HTN was transferred from [facility] to [facility] for acute onset of right-sided weakness. She was noted to have developed acute onset of right-sided weakness and language disturbance. She was initially evaluated at [facility] through tele-stroke and was treated with IV tPA. MRI brain showed left CYNDEE and MCA distribution infarct with mild hemorrhagic transformation (HI 1).     Impression: Cerebral embolism with cerebral infarction. Left ICA (MCA+CYNDEE) distribution stroke - likely etiology embolic stroke of unknown source.    NEURO: Neurologically without acute change- remains with global aphasia and right hemiplegia. Bedbound with functional quadriplegia Continue monitoring for neurologic deterioration, permissive HTN with slow titration to normotensive, Admission -  will d/c Lipitor, ischemic infarct not related to atherosclerosis,  MRI Brain w/o, MRA Head w/o and Neck w/contrast results as above. Pending Physical therapy recommend AR. Palliative team contacted to discuss goals of care (MOLST Form, DNR, hospice)- plan for family meeting 11/8 2pm.     ANTITHROMBOTIC THERAPY: ASA for second stroke prevention.    PULMONARY: Protecting airway, saturating well     CARDIOVASCULAR: No TTE needed. Cardiac monitoring no events;                           SBP goal: gradual normotension    GASTROINTESTINAL: dysphagia screen failed. Pt made NPO, s/p MBS: with recommendations for Dysphagia 1 diet, tolerating well     Diet: Dysphagia 1 honey    RENAL: BUN/Cr previously without acute change, urinary retention: 600ml retention, straight cath PRN     Na Goal: Greater than 135     Matias: N    HEMATOLOGY: no signs or symptoms of bleeding     DVT ppx:  LMWH     ID: afebrile, no overt sign of infection    OTHER: above-mentioned plan was discussed with patient and available family members at bedside in detail. All the questions were answered and concerns were addressed.    DISPOSITION: Poor prognosis for meaningful recovery. Discuss goals of care with family. Palliative consult. Rehab once workup is complete if within GOC.     CORE MEASURES:        Admission NIHSS: 22     TPA: YES       LDL/HDL: 104/54     Depression Screen: NA due to global aphasia     Statin Therapy: yes     Dysphagia Screen: FAIL     Smoking  NO      Afib NO     Stroke Education  YES to family

## 2018-11-08 DIAGNOSIS — I10 ESSENTIAL (PRIMARY) HYPERTENSION: ICD-10-CM

## 2018-11-08 LAB
ANION GAP SERPL CALC-SCNC: 10 MMOL/L — SIGNIFICANT CHANGE UP (ref 5–17)
BUN SERPL-MCNC: 12 MG/DL — SIGNIFICANT CHANGE UP (ref 7–23)
CALCIUM SERPL-MCNC: 9.8 MG/DL — SIGNIFICANT CHANGE UP (ref 8.4–10.5)
CHLORIDE SERPL-SCNC: 110 MMOL/L — HIGH (ref 96–108)
CO2 SERPL-SCNC: 23 MMOL/L — SIGNIFICANT CHANGE UP (ref 22–31)
CREAT SERPL-MCNC: 0.73 MG/DL — SIGNIFICANT CHANGE UP (ref 0.5–1.3)
GLUCOSE SERPL-MCNC: 121 MG/DL — HIGH (ref 70–99)
HCT VFR BLD CALC: 33 % — LOW (ref 34.5–45)
HGB BLD-MCNC: 11.1 G/DL — LOW (ref 11.5–15.5)
MCHC RBC-ENTMCNC: 33 PG — SIGNIFICANT CHANGE UP (ref 27–34)
MCHC RBC-ENTMCNC: 33.6 GM/DL — SIGNIFICANT CHANGE UP (ref 32–36)
MCV RBC AUTO: 98.2 FL — SIGNIFICANT CHANGE UP (ref 80–100)
PLATELET # BLD AUTO: 198 K/UL — SIGNIFICANT CHANGE UP (ref 150–400)
POTASSIUM SERPL-MCNC: 3.6 MMOL/L — SIGNIFICANT CHANGE UP (ref 3.5–5.3)
POTASSIUM SERPL-SCNC: 3.6 MMOL/L — SIGNIFICANT CHANGE UP (ref 3.5–5.3)
RBC # BLD: 3.36 M/UL — LOW (ref 3.8–5.2)
RBC # FLD: 13.2 % — SIGNIFICANT CHANGE UP (ref 10.3–14.5)
SODIUM SERPL-SCNC: 143 MMOL/L — SIGNIFICANT CHANGE UP (ref 135–145)
WBC # BLD: 4.27 K/UL — SIGNIFICANT CHANGE UP (ref 3.8–10.5)
WBC # FLD AUTO: 4.27 K/UL — SIGNIFICANT CHANGE UP (ref 3.8–10.5)

## 2018-11-08 PROCEDURE — 99233 SBSQ HOSP IP/OBS HIGH 50: CPT

## 2018-11-08 RX ORDER — FLUOXETINE HCL 10 MG
20 CAPSULE ORAL DAILY
Qty: 0 | Refills: 0 | Status: DISCONTINUED | OUTPATIENT
Start: 2018-11-08 | End: 2018-11-15

## 2018-11-08 RX ORDER — LOSARTAN POTASSIUM 100 MG/1
100 TABLET, FILM COATED ORAL DAILY
Qty: 0 | Refills: 0 | Status: DISCONTINUED | OUTPATIENT
Start: 2018-11-08 | End: 2018-11-15

## 2018-11-08 RX ADMIN — Medication 81 MILLIGRAM(S): at 11:47

## 2018-11-08 RX ADMIN — Medication 20 MILLIGRAM(S): at 13:31

## 2018-11-08 RX ADMIN — ENOXAPARIN SODIUM 40 MILLIGRAM(S): 100 INJECTION SUBCUTANEOUS at 11:47

## 2018-11-08 NOTE — PROGRESS NOTE ADULT - SUBJECTIVE AND OBJECTIVE BOX
Subjective: Patient seen and examined. No new events except as noted.   No clinical change     REVIEW OF SYSTEMS:  Unable to obtain     MEDICATIONS:  MEDICATIONS  (STANDING):  aspirin  chewable 81 milliGRAM(s) Oral daily  enoxaparin Injectable 40 milliGRAM(s) SubCutaneous daily  FLUoxetine 20 milliGRAM(s) Oral daily  influenza   Vaccine 0.5 milliLiter(s) IntraMuscular once  losartan 100 milliGRAM(s) Oral daily      PHYSICAL EXAM:  T(C): 36.7 (11-08-18 @ 15:44), Max: 37 (11-07-18 @ 23:39)  HR: 64 (11-08-18 @ 15:44) (55 - 71)  BP: 133/67 (11-08-18 @ 15:44) (131/65 - 147/76)  RR: 18 (11-08-18 @ 15:44) (18 - 18)  SpO2: 98% (11-08-18 @ 15:44) (96% - 98%)  Wt(kg): --  I&O's Summary    07 Nov 2018 07:01  -  08 Nov 2018 07:00  --------------------------------------------------------  IN: 240 mL / OUT: 800 mL / NET: -560 mL    08 Nov 2018 07:01  -  08 Nov 2018 19:32  --------------------------------------------------------  IN: 320 mL / OUT: 0 mL / NET: 320 mL        Appearance: NAD aphasic 	  HEENT:   Normal oral mucosa, PERRL, EOMI	  Lymphatic: No lymphadenopathy , no edema  Cardiovascular: regular S1 S2, No JVD, No murmurs , Peripheral pulses palpable 2+ bilaterally  Respiratory: Lungs clear to auscultation, normal effort 	  Gastrointestinal:  Soft, Non-tender, + BS	  Skin: No rashes, No ecchymoses, No cyanosis, warm to touch  Musculoskeletal: decreased  range of motion and strength  Psychiatry:  Mood & affect appropriate  Ext: No edema  NEUROLOGICAL EXAM:  Mental status: Awake, alert, mute, globally aphasic, able to follow some simple commands, unable to mimic, left hemineglect   Cranial Nerves: right facial droop, right gaze palsy, RHH  Motor exam: right hypotonic hemiplegia and was able to move LUE and LLE against gravity without noticeable weakness   Sensation: grimaces to noxious stimuli  Coordination/ Gait: unable to participate with exam due to aphasic             LABS:    CARDIAC MARKERS:                                11.1   4.27  )-----------( 198      ( 08 Nov 2018 08:17 )             33.0     11-08    143  |  110<H>  |  12  ----------------------------<  121<H>  3.6   |  23  |  0.73    Ca    9.8      08 Nov 2018 06:43      proBNP:   Lipid Profile:   HgA1c:   TSH:             TELEMETRY: 	    ECG:  	  RADIOLOGY:   DIAGNOSTIC TESTING:  [ ] Echocardiogram:  [ ]  Catheterization:  [ ] Stress Test:    OTHER:

## 2018-11-08 NOTE — PROGRESS NOTE ADULT - PROBLEM SELECTOR PLAN 4
Meeting with family 2pm tomorrow 11/8
Met with above family members, declined , spoke in our common language French.  Family requested that grandson act as  as needed.  Discussed and completed MOLST form.  Pt is DNR/DNI.  See MOLST for details surrounding future goals.  Briefly,  pt is DNR/DNI,  TRIAL OF NG TUBE IF BECOMES DYSPHASIC,  IV FLUIDS AS NEEDED,  ANTIBIOTICS FOR INFECTION, RETURN TO HOSPITAL, FULL MEDICAL MANAGEMENT SHORT OF INTUBATION AND RESUSCITATION. Family wishes to pursue rehab if she is a candidate with eventual return home.   Four of the 8 children live in [place] and are in constant contact with the [place] siblings.  Signing off, reconsult for any acute issues. Thank you

## 2018-11-08 NOTE — PROGRESS NOTE ADULT - SUBJECTIVE AND OBJECTIVE BOX
THE PATIENT WAS SEEN AND EXAMINED BY ME WITH THE HOUSESTAFF AND STROKE TEAM DURING MORNING ROUNDS.   HPI:  87 yo right-handed Guinean speaking woman who presented to [facility] as a transfer from [facility] for new and acute right-sided weakness and language disturbance (LWK 5-530pm, 11/2/18). ROS also revealed non-verbal, unresponsiveness. Otherwise limited due to patient's current cognitive state. Pertinent hx includes HTN and "heart condition" (unspecified w/ family at bedside). Patient's initial NIHSS score worsened to 28 upon arrival to [facility], was given tPA (1859pm, 11/2/18) and subsequently transferred for possible endovascular intervention. Patient's on arrival to [facility] ER NIHSS 22, pre-MRS 0, no M1 occlusion.     SUBJECTIVE: No events overnight.  No new neurologic complaints.      aspirin  chewable 81 milliGRAM(s) Oral daily  enoxaparin Injectable 40 milliGRAM(s) SubCutaneous daily  influenza   Vaccine 0.5 milliLiter(s) IntraMuscular once    PHYSICAL EXAM:   Vital Signs Last 24 Hrs  T(C): 37 (08 Nov 2018 04:47), Max: 37.1 (07 Nov 2018 16:43)  T(F): 98.6 (08 Nov 2018 04:47), Max: 98.8 (07 Nov 2018 16:43)  HR: 56 (08 Nov 2018 04:47) (55 - 66)  BP: 136/74 (08 Nov 2018 04:47) (131/65 - 152/71)  RR: 18 (08 Nov 2018 04:47) (18 - 18)  SpO2: 96% (08 Nov 2018 04:47) (96% - 99%)    General: No acute distress  HEENT:  right gaze palsy, RHH  Abdomen: Soft, nontender, nondistended   Extremities: No edema    NEUROLOGICAL EXAM:  Mental status: Awake, alert, mute, globally aphasic, able to follow some simple commands, unable to mimic, left hemineglect   Cranial Nerves: right facial droop, right gaze palsy, RHH  Motor exam: right hypotonic hemiplegia and was able to move LUE and LLE against gravity without noticeable weakness   Sensation: grimaces to noxious stimuli  Coordination/ Gait: unable to participate with exam due to aphasic     LABS: Hemoglobin A1C, Whole Blood: 5.4 % (11-03 @ 05:06)    IMAGING: Reviewed by me.     Brain MRI (11/03/18) left CYNDEE and left MCA territory infarcts with small amount of cortical hemorrhage in the left MCA infarct    Head CT, CT perfusion, CTA head/Neck  (11/03/18) Atrophy. 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     CTA demonstrates decreased number of left middle cerebral artery branches in the sylvian fissure compared with the right consistent with a distal branch occlusion. There is a left distal A2 occlusion.

## 2018-11-08 NOTE — PROGRESS NOTE ADULT - ASSESSMENT
87 y/o female admitted with right sided weakness and aphasia in setting o f large MCA infarct with noted L internal carotid occlusion not a candidate for embolectomy, called for goals of care

## 2018-11-08 NOTE — PROGRESS NOTE ADULT - ASSESSMENT
86 year old woman with multiple vascular risk factors, including age and HTN was transferred from [facility] to [facility] for acute onset of right-sided weakness. She was noted to have developed acute onset of right-sided weakness and language disturbance. She was initially evaluated at [facility] through tele-stroke and was treated with IV tPA. MRI brain showed left CYNDEE and MCA distribution infarct with mild hemorrhagic transformation (HI 1).     Impression: Cerebral embolism with cerebral infarction. Left ICA (MCA+CYNDEE) distribution stroke - likely etiology embolic stroke of unknown source.    NEURO: Neurologically without acute change- remains with global aphasia and right hemiplegia. Bedbound with functional quadriplegia. Continue monitoring for neurologic deterioration, permissive HTN with slow titration to normotensive, Admission -  will d/c Lipitor, ischemic infarct not related to atherosclerosis,  MRI Brain w/o, MRA Head w/o and Neck w/contrast results as above. Pending Physical therapy recommend AR. Palliative team contacted to discuss goals of care (MOLST Form, DNR, hospice)- plan for family meeting 11/8 2pm.     ANTITHROMBOTIC THERAPY: ASA for second stroke prevention.    PULMONARY: Protecting airway, saturating well     CARDIOVASCULAR: No TTE needed. Cardiac monitoring no events;                           SBP goal: gradual normotension    GASTROINTESTINAL: dysphagia screen failed. Pt made NPO, s/p MBS: with recommendations for Dysphagia 1 diet, tolerating well     Diet: Dysphagia 1 honey    RENAL: BUN/Cr previously without acute change, urinary retention: 600ml retention, straight cath PRN     Na Goal: Greater than 135     Matias: N    HEMATOLOGY: no signs or symptoms of bleeding     DVT ppx:  LMWH     ID: afebrile, no overt sign of infection    OTHER: above-mentioned plan was discussed with patient and available family members at bedside in detail. All the questions were answered and concerns were addressed.    DISPOSITION: Poor prognosis for meaningful recovery. Discuss goals of care with family. Palliative consult. Rehab once workup is complete if within GOC.     CORE MEASURES:        Admission NIHSS: 22     TPA: YES       LDL/HDL: 104/54     Depression Screen: NA due to global aphasia     Statin Therapy: yes     Dysphagia Screen: FAIL     Smoking  NO      Afib NO     Stroke Education  YES to family 86 year old woman with multiple vascular risk factors, including age and HTN was [facility] to [facility] for acute onset of right-sided weakness. She was noted to have developed acute onset of right-sided weakness and language disturbance. She was initially evaluated at [facility] through tele-stroke and was treated with IV tPA. MRI brain showed left CYNDEE and MCA distribution infarct with mild hemorrhagic transformation (HI 1).     Impression: Cerebral embolism with cerebral infarction. Left ICA (MCA+CYNDEE) distribution stroke - likely etiology embolic stroke of unknown source.    NEURO: Neurologically without acute change- remains with global aphasia and right hemiplegia. Bedbound with functional quadriplegia. Continue monitoring for neurologic deterioration, permissive HTN with slow titration to normotensive, Admission -  will d/c Lipitor, ischemic infarct not related to atherosclerosis,  MRI Brain w/o, MRA Head w/o and Neck w/contrast results as above. Pending Physical therapy recommend AR. Palliative team contacted to discuss goals of care (MOLST Form, DNR, hospice)- plan for family meeting 11/8 2pm.     ANTITHROMBOTIC THERAPY: ASA for second stroke prevention.    PULMONARY: Protecting airway, saturating well     CARDIOVASCULAR: TTE to r/o cardiac source of embolism. Cardiac monitoring no events;                           SBP goal: gradual normotension    GASTROINTESTINAL: dysphagia screen failed. Pt made NPO, s/p MBS: with recommendations for Dysphagia 1 diet, tolerating well     Diet: Dysphagia 1 honey    RENAL: BUN/Cr previously without acute change, urinary retention: 600ml retention, straight cath PRN     Na Goal: Greater than 135     Matias: N    HEMATOLOGY: no signs or symptoms of bleeding     DVT ppx:  LMWH     ID: afebrile, no overt sign of infection    OTHER: above-mentioned plan was discussed with patient and available family members at bedside in detail. All the questions were answered and concerns were addressed.    DISPOSITION: Poor prognosis for meaningful recovery. Discuss goals of care with family member available at bedside. Palliative consult. Rehab once workup is complete if within GOC.     CORE MEASURES:        Admission NIHSS: 22     TPA: YES       LDL/HDL: 104/54     Depression Screen: NA due to global aphasia     Statin Therapy: yes     Dysphagia Screen: FAIL     Smoking  NO      Afib NO     Stroke Education  YES to family

## 2018-11-08 NOTE — PROGRESS NOTE ADULT - SUBJECTIVE AND OBJECTIVE BOX
SUBJECTIVE AND OBJECTIVE:  INTERVAL HPI/OVERNIGHT EVENTS:  Awake , alert, sitting up in chair, being fed lunch , appears comfortable in no acute distress, no overt evidence of aspiration .   Patients children at bedside.    DNR on chart:   Allergies    No Known Allergies    Intolerances    MEDICATIONS  (STANDING):  aspirin  chewable 81 milliGRAM(s) Oral daily  enoxaparin Injectable 40 milliGRAM(s) SubCutaneous daily  FLUoxetine 20 milliGRAM(s) Oral daily  influenza   Vaccine 0.5 milliLiter(s) IntraMuscular once  losartan 100 milliGRAM(s) Oral daily    MEDICATIONS  (PRN):      ITEMS UNCHECKED ARE NOT PRESENT    PRESENT SYMPTOMS: [ ]Unable to obtain due to poor mentation   Source if other than patient:  [ ]Family   [ ]Team     Pain (Impact on QOL):    Location:  Minimal acceptable level (0-10 scale):            Aggravating factors:  Quality:  Radiation:  Severity (0-10 scale):    Timing:    Dyspnea:                           [ ]Mild [ ]Moderate [ ]Severe  Anxiety:                             [ ]Mild [ ]Moderate [ ]Severe  Fatigue:                             [ ]Mild [ ]Moderate [ ]Severe  Nausea:                             [ ]Mild [ ]Moderate [ ]Severe  Loss of appetite:              [ ]Mild [ ]Moderate [ ]Severe  Constipation:                    [ ]Mild [ ]Moderate [ ]Severe    PAIN AD Score:	0  [email or web address] (Ctrl + left click to view)    Other Symptoms:  [x ]All other review of systems negative     Karnofsky Performance Score/Palliative Performance Status Version 2:      30   %    http://palliative.info/resource_material/[email or web address]  PHYSICAL EXAM:  Vital Signs Last 24 Hrs  T(C): 36.7 (08 Nov 2018 11:50), Max: 37.1 (07 Nov 2018 16:43)  T(F): 98 (08 Nov 2018 11:50), Max: 98.8 (07 Nov 2018 16:43)  HR: 69 (08 Nov 2018 11:50) (55 - 71)  BP: 136/74 (08 Nov 2018 11:50) (131/65 - 152/71)  BP(mean): --  RR: 18 (08 Nov 2018 11:50) (18 - 18)  SpO2: 97% (08 Nov 2018 11:50) (96% - 99%) I&O's Summary    07 Nov 2018 07:01  -  08 Nov 2018 07:00  --------------------------------------------------------  IN: 240 mL / OUT: 800 mL / NET: -560 mL    08 Nov 2018 07:01  -  08 Nov 2018 15:41  --------------------------------------------------------  IN: 320 mL / OUT: 0 mL / NET: 320 mL     GENERAL:  [ x]Alert  [ x]Oriented x 1  [ ]Lethargic  [ ]Cachexia  [ ]Unarousable  [ ]Verbal  [x ]Non-Verbal    Behavioral:   [ ] Anxiety  [ ] Delirium [ ] Agitation [ ] Other    HEENT:  [x ]Normal   [ ]Dry mouth   [ ]ET Tube/Trach  [ ]Oral lesions    PULMONARY:   [x ]Clear [ ]Tachypnea  [ ]Audible excessive secretions   [ ]Rhonchi        [ ]Right [ ]Left [ ]Bilateral  [ ]Crackles        [ ]Right [ ]Left [ ]Bilateral  [ ]Wheezing     [ ]Right [ ]Left [ ]Bilateral    CARDIOVASCULAR:    [x ]Regular [ ]Irregular [ ]Tachy  [ ]Cachorro [ ]Murmur [ ]Other    GASTROINTESTINAL:  [x ]Soft  [ ]Distended   [x ]+BS  x ]Non tender [ ]Tender  [ ]PEG [ ]OGT/ NGT   Last BM:    GENITOURINARY:  [ ]Normal [x ] Incontinent   [ ]Oliguria/Anuria   [ ]Matias    MUSCULOSKELETAL:   [ ]Normal   [x ]Weakness  [x ]Bed/Wheelchair bound [ ]Edema    NEUROLOGIC:   [ ]No focal deficits  [x ] Cognitive impairment  [ ] Dysphagia [ ]Dysarthria [ ] Paresis [ ]Other     SKIN:   [ x]Normal   [ ]Pressure ulcer(s)  [ ]Rash    CRITICAL CARE:  [ ] Shock Present  [ ]Septic [ ]Cardiogenic [x ]Neurologic [ ]Hypovolemic  [ ]  Vasopressors [ ]  Inotropes   [ ] Respiratory failure present  [ ] Acute  [ ] Chronic [ ] Hypoxic  [ ] Hypercarbic [ ] Other  [ ] Other organ failure     LABS:                        11.1   4.27  )-----------( 198      ( 08 Nov 2018 08:17 )             33.0   11-08    143  |  110<H>  |  12  ----------------------------<  121<H>  3.6   |  23  |  0.73    Ca    9.8      08 Nov 2018 06:43          RADIOLOGY & ADDITIONAL STUDIES:      INTERPRETATION:    CLINICAL INDICATION: Acute left middle cerebral artery infarct      Magnetic resonance imaging of the brain was carried out with transaxial   SPGR,FLAIR, fast spin echo T2 weighted images, axial susceptibility   weighted series, diffusion weighted series and sagittal T1 weighted   series on a 1.5 Glenys magnet.    Comparison is made with the prior CT/CTA of 11/2/2018    Diffusion-weighted imaging is positive for an acute infarct in the left   frontal parasagittal and left frontal parietal cortical distributions   consistent with acute infarct in the left anterior and middle cerebral   artery territories. There is mild sulcal effacement. There is a small   amount of petechial hemorrhage along the cortex seen only on the   susceptibility weighted series.    Small vessel white matter ischemic changes are also noted.    There is a short air-fluid level in the sphenoid sinus.    There has been previous bilateral lens replacement surgery.        IMPRESSION: Acute infarcts in the left anterior and middle cerebral   artery territory with restricted diffusion and a small amount of   hemorrhage on susceptibility series..    Dr. Cuong hill with Dr. Moy on 11/4/2018 at 4:00 AM.      Protein Calorie Malnutrition Present: [ ] yes [ ] no  [ ] PPSV2 < or = 30%  [ ] significant weight loss [ ] poor nutritional intake [ ] anasarca [ ] catabolic state Albumin, Serum: 3.8 g/dL (11-02-18 @ 21:39)  Artificial Nutrition [ ]     REFERRALS:   [ ]Chaplaincy  [ ] Hospice  [ ]Child Life  [x ]Social Work  [ ]Case management [ ]Holistic Therapy   Goals of Care Document:

## 2018-11-09 PROCEDURE — 99233 SBSQ HOSP IP/OBS HIGH 50: CPT

## 2018-11-09 RX ADMIN — LOSARTAN POTASSIUM 100 MILLIGRAM(S): 100 TABLET, FILM COATED ORAL at 06:29

## 2018-11-09 RX ADMIN — Medication 20 MILLIGRAM(S): at 11:33

## 2018-11-09 RX ADMIN — Medication 81 MILLIGRAM(S): at 11:33

## 2018-11-09 RX ADMIN — ENOXAPARIN SODIUM 40 MILLIGRAM(S): 100 INJECTION SUBCUTANEOUS at 11:33

## 2018-11-09 NOTE — PROGRESS NOTE ADULT - ASSESSMENT
86 year old woman with multiple vascular risk factors, including age and HTN was transferred [facility] to [facility] for acute onset of right-sided weakness. She was noted to have developed acute onset of right-sided weakness and language disturbance. She was initially evaluated at [facility] through tele-stroke and was treated with IV tPA. MRI brain showed left CYNDEE and MCA distribution infarct with mild hemorrhagic transformation (HI 1).     Impression: Cerebral embolism with cerebral infarction. Left ICA (MCA+CYNDEE) distribution stroke - likely etiology embolic stroke of unknown source.    NEURO: Neurologically without acute change- remains with global aphasia and right hemiplegia. Bedbound with functional quadriplegia. Continue monitoring for neurologic deterioration, permissive HTN with slow titration to normotensive, Admission -  will d/c Lipitor, ischemic infarct not related to atherosclerosis,  MRI Brain w/o, MRA Head w/o and Neck w/contrast results as above. Pending Physical therapy recommend AR. Palliative team contacted to discuss goals of care (MOLST Form, DNR, hospice)- plan for family meeting 11/8 2pm.     ANTITHROMBOTIC THERAPY: ASA for second stroke prevention.    PULMONARY: Protecting airway, saturating well     CARDIOVASCULAR: TTE to r/o cardiac source of embolism. Cardiac monitoring no events;                           SBP goal: gradual normotension    GASTROINTESTINAL: dysphagia screen failed. Pt made NPO, s/p MBS: with recommendations for Dysphagia 1 diet, tolerating well     Diet: Dysphagia 1 honey    RENAL: BUN/Cr previously without acute change, urinary retention: 600ml retention, straight cath PRN     Na Goal: Greater than 135     Matias: N    HEMATOLOGY: no signs or symptoms of bleeding     DVT ppx:  LMWH     ID: afebrile, no overt sign of infection    OTHER: above-mentioned plan was discussed with patient and available family members at bedside in detail. All the questions were answered and concerns were addressed.    DISPOSITION: Poor prognosis for meaningful recovery. Discuss goals of care with family member available at bedside. Palliative consult. Rehab once workup is complete if within GOC.     CORE MEASURES:        Admission NIHSS: 22     TPA: YES       LDL/HDL: 104/54     Depression Screen: NA due to global aphasia     Statin Therapy: yes     Dysphagia Screen: FAIL     Smoking  NO      Afib NO     Stroke Education  YES to family 86 year old woman with multiple vascular risk factors, including age and HTN was transferred from [facility] to [facility] for acute onset of right-sided weakness. She was noted to have developed acute onset of right-sided weakness and language disturbance. She was initially evaluated at [facility] through tele-stroke and was treated with IV tPA. MRI brain showed left CYNDEE and MCA distribution infarct with mild hemorrhagic transformation (HI 1).     Impression: Cerebral embolism with cerebral infarction. Left ICA (MCA+CYNDEE) distribution stroke - likely etiology embolic stroke of unknown source.    NEURO: Neurologically without acute change- remains with global aphasia and right hemiplegia. Bedbound with functional quadriplegia. Continue monitoring for neurologic deterioration, permissive HTN with slow titration to normotensive, Admission -  will d/c Lipitor, ischemic infarct not related to atherosclerosis,  MRI Brain w/o, MRA Head w/o and Neck w/contrast results as above. Pending Physical therapy recommend AR. Palliative team contacted to discuss goals of care (MOLST Form, DNR, hospice)-  family meeting done on 11/8 2pm, family decided code status DNR/DNI and are hopeful for rehab.     ANTITHROMBOTIC THERAPY: ASA for second stroke prevention.    PULMONARY: Protecting airway, saturating well     CARDIOVASCULAR: TTE to r/o cardiac source of embolism. Cardiac monitoring no events;                           SBP goal: gradual normotension    GASTROINTESTINAL: dysphagia screen failed. Pt made NPO, s/p MBS: with recommendations for Dysphagia 1 diet, tolerating well     Diet: Dysphagia 1 honey    RENAL: BUN/Cr previously without acute change, urinary retention: 600ml retention, straight cath PRN     Na Goal: Greater than 135     Matias: N    HEMATOLOGY: no signs or symptoms of bleeding     DVT ppx:  LMWH     ID: afebrile, no overt sign of infection    OTHER: above-mentioned plan was discussed with patient and available family members at bedside in detail. All the questions were answered and concerns were addressed.    DISPOSITION: Poor prognosis for meaningful recovery. Discuss goals of care with family member available at bedside. Palliative consult. Rehab once workup is complete if within GOC.     CORE MEASURES:        Admission NIHSS: 22     TPA: YES       LDL/HDL: 104/54     Depression Screen: NA due to global aphasia     Statin Therapy: yes     Dysphagia Screen: FAIL     Smoking  NO      Afib NO     Stroke Education  YES to family 86 year old woman with multiple vascular risk factors, including age and HTN was transferred [facility] to [facility] for acute onset of right-sided weakness. She was noted to have developed acute onset of right-sided weakness and language disturbance. She was initially evaluated at [facility] through tele-stroke and was treated with IV tPA. MRI brain showed left CYNDEE and MCA distribution infarct with mild hemorrhagic transformation (HI 1). Impression: Cerebral embolism with cerebral infarction. Left ICA (MCA+CYNDEE) distribution stroke - likely etiology embolic stroke of unknown source.    NEURO: Neurologically without acute change- remains with global aphasia and right hemiplegia. Bedbound with functional quadriplegia. Continue monitoring for neurologic deterioration, permissive HTN with slow titration to normotensive, Admission -  will d/c Lipitor, ischemic infarct not related to atherosclerosis,  ending Physical therapy recommend AR. Palliative team contacted to discuss goals of care (MOLST Form, DNR, hospice)-  family meeting done on 11/8 2pm, family decided code status DNR/DNI and are hopeful for rehab.     ANTITHROMBOTIC THERAPY: ASA for second stroke prevention.    PULMONARY: Protecting airway, saturating well     CARDIOVASCULAR: TTE to r/o cardiac source of embolism. Cardiac monitoring no events;                           SBP goal: gradual normotension    GASTROINTESTINAL: dysphagia screen failed. Pt made NPO, s/p MBS: with recommendations for Dysphagia 1 diet, tolerating well     Diet: Dysphagia 1 honey    RENAL: BUN/Cr previously without acute change, urinary retention: 600ml retention, straight cath PRN     Na Goal: Greater than 135     Matias: N    HEMATOLOGY: no signs or symptoms of bleeding     DVT ppx:  LMWH     ID: afebrile, no overt sign of infection    OTHER: above-mentioned plan was discussed with patient and available family members at bedside in detail. All the questions were answered and concerns were addressed.    DISPOSITION: Poor prognosis for meaningful recovery. Discuss goals of care with family member available at bedside. Palliative consult. Rehab once workup is complete if within GOC.     CORE MEASURES:        Admission NIHSS: 22     TPA: YES       LDL/HDL: 104/54     Depression Screen: NA due to global aphasia     Statin Therapy: yes     Dysphagia Screen: FAIL     Smoking  NO      Afib NO     Stroke Education  YES to family

## 2018-11-09 NOTE — PROGRESS NOTE ADULT - SUBJECTIVE AND OBJECTIVE BOX
Subjective: Patient seen and examined. No new events except as noted.     REVIEW OF SYSTEMS:    Unable to obtain       MEDICATIONS:  MEDICATIONS  (STANDING):  aspirin  chewable 81 milliGRAM(s) Oral daily  enoxaparin Injectable 40 milliGRAM(s) SubCutaneous daily  FLUoxetine 20 milliGRAM(s) Oral daily  influenza   Vaccine 0.5 milliLiter(s) IntraMuscular once  losartan 100 milliGRAM(s) Oral daily      PHYSICAL EXAM:  T(C): 37.2 (11-09-18 @ 11:47), Max: 37.2 (11-09-18 @ 11:47)  HR: 93 (11-09-18 @ 11:47) (54 - 93)  BP: 114/61 (11-09-18 @ 11:47) (114/61 - 161/68)  RR: 18 (11-09-18 @ 11:47) (18 - 18)  SpO2: 99% (11-09-18 @ 11:47) (95% - 99%)  Wt(kg): --  I&O's Summary    08 Nov 2018 07:01  -  09 Nov 2018 07:00  --------------------------------------------------------  IN: 350 mL / OUT: 0 mL / NET: 350 mL          Appearance: NAD aphasic 	  HEENT:   Normal oral mucosa, PERRL, EOMI	  Lymphatic: No lymphadenopathy , no edema  Cardiovascular: regular S1 S2, No JVD, No murmurs , Peripheral pulses palpable 2+ bilaterally  Respiratory: Lungs clear to auscultation, normal effort 	  Gastrointestinal:  Soft, Non-tender, + BS	  Skin: No rashes, No ecchymoses, No cyanosis, warm to touch  Musculoskeletal: decreased  range of motion and strength  Psychiatry:  Mood & affect appropriate  Ext: No edema  NEUROLOGICAL EXAM:  Mental status: Awake, alert, mute, globally aphasic, able to follow some simple commands, unable to mimic, left hemineglect   Cranial Nerves: right facial droop, right gaze palsy, [facility]  Motor exam: right hypotonic hemiplegia and was able to move LUE and LLE against gravity without noticeable weakness   Sensation: grimaces to noxious stimuli  Coordination/ Gait: unable to participate with exam due to aphasic           LABS:    CARDIAC MARKERS:                                11.1   4.27  )-----------( 198      ( 08 Nov 2018 08:17 )             33.0     11-08    143  |  110<H>  |  12  ----------------------------<  121<H>  3.6   |  23  |  0.73    Ca    9.8      08 Nov 2018 06:43      proBNP:   Lipid Profile:   HgA1c:   TSH:             TELEMETRY: 	    ECG:  	  RADIOLOGY:   DIAGNOSTIC TESTING:  [ ] Echocardiogram:  [ ]  Catheterization:  [ ] Stress Test:    OTHER:

## 2018-11-09 NOTE — PROGRESS NOTE ADULT - SUBJECTIVE AND OBJECTIVE BOX
THE PATIENT WAS SEEN AND EXAMINED BY ME WITH THE HOUSESTAFF AND STROKE TEAM DURING MORNING ROUNDS.   HPI:  85 yo right-handed Moldovan speaking woman who presented to [facility] as a transfer from [facility] for new and acute right-sided weakness and language disturbance (LWK 5-530pm, 11/2/18). ROS also revealed non-verbal, unresponsiveness. Otherwise limited due to patient's current cognitive state. Pertinent hx includes HTN and "heart condition" (unspecified w/ family at bedside). Patient's initial NIHSS score worsened to 28 upon arrival to [facility], was given tPA (1859pm, 11/2/18) and subsequently transferred for possible endovascular intervention. Patient's on arrival to [facility] ER NIHSS 22, pre-MRS 0, no M1 occlusion.   THE PATIENT WAS SEEN AND EXAMINED BY ME WITH THE HOUSESTAFF AND STROKE TEAM DURING MORNING ROUNDS.   HPI:  85 yo right-handed Moldovan speaking woman who presents to [facility] as a transfer from [facility] for new and acute right-sided weakness and language disturbance (LWK 5-530pm, 11/2/18). ROS also revealed non-verbal, unresponsiveness. Otherwise limited due to patient's current cognitive state. Pertinent hx includes HTN and "heart condition" (unspecified w/ family at bedside). Patient's initial NIHSS score worsened to 28 upon arrival to [facility], was given tPA (1859pm, 11/2/18) and subsequently transferred for possible endovascular intervention. Patient's current NIHSS 22, pre-MRS 0, no M1 occlusion. (02 Nov 2018 22:01)      SUBJECTIVE: No events overnight.  No new neurologic complaints.      aspirin  chewable 81 milliGRAM(s) Oral daily  enoxaparin Injectable 40 milliGRAM(s) SubCutaneous daily  FLUoxetine 20 milliGRAM(s) Oral daily  influenza   Vaccine 0.5 milliLiter(s) IntraMuscular once  losartan 100 milliGRAM(s) Oral daily      PHYSICAL EXAM:   Vital Signs Last 24 Hrs  T(C): 36.9 (09 Nov 2018 08:00), Max: 37.1 (09 Nov 2018 04:47)  T(F): 98.5 (09 Nov 2018 08:00), Max: 98.7 (09 Nov 2018 04:47)  HR: 54 (09 Nov 2018 08:00) (54 - 69)  BP: 115/62 (09 Nov 2018 08:00) (115/62 - 161/68)  BP(mean): --  RR: 18 (09 Nov 2018 08:00) (18 - 18)  SpO2: 95% (09 Nov 2018 08:00) (95% - 99%)    General: No acute distress  HEENT:  right gaze palsy, RHH  Abdomen: Soft, nontender, nondistended   Extremities: No edema    NEUROLOGICAL EXAM:  Mental status: Awake, alert, mute, globally aphasic, able to follow some simple commands, unable to mimic, left hemineglect   Cranial Nerves: right facial droop, right gaze palsy, RHH  Motor exam: right hypotonic hemiplegia and was able to move LUE and LLE against gravity without noticeable weakness   Sensation: grimaces to noxious stimuli  Coordination/ Gait: unable to participate with exam due to aphasic     LABS:                        11.1   4.27  )-----------( 198      ( 08 Nov 2018 08:17 )             33.0    11-08    143  |  110<H>  |  12  ----------------------------<  121<H>  3.6   |  23  |  0.73    Ca    9.8      08 Nov 2018 06:43      Hemoglobin A1C, Whole Blood: 5.4 % (11-03 @ 05:06)      IMAGING: Reviewed by me.   Brain MRI (11/03/18) left CYNDEE and left MCA territory infarcts with small amount of cortical hemorrhage in the left MCA infarct    Head CT, CT perfusion, CTA head/Neck  (11/03/18) Atrophy. 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     CTA HEAD (11/03/18): demonstrates decreased number of left middle cerebral artery branches in the sylvian fissure compared with the right consistent with a distal branch occlusion. There is a left distal A2 occlusion. THE PATIENT WAS SEEN AND EXAMINED BY ME WITH THE HOUSESTAFF AND STROKE TEAM DURING MORNING ROUNDS.   HPI:  87 yo right-handed Icelandic speaking woman who presented to [facility] as a transfer from [facility] for new and acute right-sided weakness and language disturbance (LWK 5-530pm, 11/2/18). ROS also revealed non-verbal, unresponsiveness. Otherwise limited due to patient's current cognitive state. Pertinent hx includes HTN and "heart condition" (unspecified w/ family at bedside). Patient's initial NIHSS score worsened to 28 upon arrival to [facility], was given tPA (1859pm, 11/2/18) and subsequently transferred for possible endovascular intervention. Patient's on arrival to [facility] ER NIHSS 22, pre-MRS 0, no M1 occlusion.     SUBJECTIVE: No events overnight.  No new neurologic complaints.      aspirin  chewable 81 milliGRAM(s) Oral daily  enoxaparin Injectable 40 milliGRAM(s) SubCutaneous daily  FLUoxetine 20 milliGRAM(s) Oral daily  influenza   Vaccine 0.5 milliLiter(s) IntraMuscular once  losartan 100 milliGRAM(s) Oral daily      PHYSICAL EXAM:   Vital Signs Last 24 Hrs  T(C): 36.9 (09 Nov 2018 08:00), Max: 37.1 (09 Nov 2018 04:47)  T(F): 98.5 (09 Nov 2018 08:00), Max: 98.7 (09 Nov 2018 04:47)  HR: 54 (09 Nov 2018 08:00) (54 - 69)  BP: 115/62 (09 Nov 2018 08:00) (115/62 - 161/68)  BP(mean): --  RR: 18 (09 Nov 2018 08:00) (18 - 18)  SpO2: 95% (09 Nov 2018 08:00) (95% - 99%)    General: No acute distress  HEENT:  right gaze palsy, RHH  Abdomen: Soft, nontender, nondistended   Extremities: No edema    NEUROLOGICAL EXAM:  Mental status: Awake, alert, mute, globally aphasic, able to follow some simple commands, unable to mimic, left hemineglect   Cranial Nerves: right facial droop, right gaze palsy, RHH  Motor exam: right hypotonic hemiplegia and was able to move LUE and LLE against gravity without noticeable weakness   Sensation: grimaces to noxious stimuli  Coordination/ Gait: unable to participate with exam due to aphasic     LABS:                        11.1   4.27  )-----------( 198      ( 08 Nov 2018 08:17 )             33.0    11-08    143  |  110<H>  |  12  ----------------------------<  121<H>  3.6   |  23  |  0.73    Ca    9.8      08 Nov 2018 06:43      Hemoglobin A1C, Whole Blood: 5.4 % (11-03 @ 05:06)      IMAGING: Reviewed by me.   Brain MRI (11/03/18) left CYNDEE and left MCA territory infarcts with small amount of cortical hemorrhage in the left MCA infarct    Head CT, CT perfusion, CTA head/Neck  (11/03/18) Atrophy. 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     CTA HEAD (11/03/18): demonstrates decreased number of left middle cerebral artery branches in the sylvian fissure compared with the right consistent with a distal branch occlusion. There is a left distal A2 occlusion.

## 2018-11-10 PROCEDURE — 99233 SBSQ HOSP IP/OBS HIGH 50: CPT

## 2018-11-10 RX ADMIN — ENOXAPARIN SODIUM 40 MILLIGRAM(S): 100 INJECTION SUBCUTANEOUS at 12:55

## 2018-11-10 RX ADMIN — Medication 81 MILLIGRAM(S): at 12:55

## 2018-11-10 RX ADMIN — Medication 20 MILLIGRAM(S): at 12:55

## 2018-11-10 RX ADMIN — LOSARTAN POTASSIUM 100 MILLIGRAM(S): 100 TABLET, FILM COATED ORAL at 05:39

## 2018-11-10 NOTE — PROGRESS NOTE ADULT - SUBJECTIVE AND OBJECTIVE BOX
THE PATIENT WAS SEEN AND EXAMINED BY ME WITH THE HOUSESTAFF AND STROKE TEAM DURING MORNING ROUNDS.   HPI:  85 yo right-handed Hungarian speaking woman who presented to [facility] as a transfer from [facility] for new and acute right-sided weakness and language disturbance (LWK 5-530pm, 11/2/18). ROS also revealed non-verbal, unresponsiveness. Otherwise limited due to patient's current cognitive state. Pertinent hx includes HTN and "heart condition" (unspecified w/ family at bedside). Patient's initial NIHSS score worsened to 28 upon arrival to [facility], was given tPA (1859pm, 11/2/18) and subsequently transferred for possible endovascular intervention. Patient's on arrival to [facility] ER NIHSS 22, pre-MRS 0, no M1 occlusion.     SUBJECTIVE: No events overnight.  No new neurologic complaints.      aspirin  chewable 81 milliGRAM(s) Oral daily  enoxaparin Injectable 40 milliGRAM(s) SubCutaneous daily  FLUoxetine 20 milliGRAM(s) Oral daily  influenza   Vaccine 0.5 milliLiter(s) IntraMuscular once  losartan 100 milliGRAM(s) Oral daily    PHYSICAL EXAM:   Vital Signs Last 24 Hrs  T(C): 36.8 (10 Nov 2018 04:52), Max: 37.2 (09 Nov 2018 11:47)  T(F): 98.3 (10 Nov 2018 04:52), Max: 98.9 (09 Nov 2018 11:47)  HR: 63 (10 Nov 2018 04:52) (54 - 93)  BP: 148/69 (10 Nov 2018 04:52) (114/61 - 148/69)  RR: 18 (10 Nov 2018 04:52) (18 - 18)  SpO2: 96% (10 Nov 2018 04:52) (95% - 99%)    General: No acute distress  HEENT:  right gaze palsy, RHH  Abdomen: Soft, nontender, nondistended   Extremities: No edema    NEUROLOGICAL EXAM:  Mental status: Awake, alert, mute, globally aphasic, able to follow some simple commands, unable to mimic, left hemineglect   Cranial Nerves: right facial droop, right gaze palsy, RHH  Motor exam: right hypotonic hemiplegia and was able to move LUE and LLE against gravity without noticeable weakness   Sensation: grimaces to noxious stimuli  Coordination/ Gait: unable to participate with exam due to aphasic       LABS:                        11.1   4.27  )-----------( 198      ( 08 Nov 2018 08:17 )             33.0        Hemoglobin A1C, Whole Blood: 5.4 % (11-03 @ 05:06)      IMAGING: Reviewed by me.     Brain MRI (11/03/18) left CYNDEE and left MCA territory infarcts with small amount of cortical hemorrhage in the left MCA infarct    Head CT, CT perfusion, CTA head/Neck  (11/03/18) Atrophy. 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     CTA HEAD (11/03/18): demonstrates decreased number of left middle cerebral artery branches in the sylvian fissure compared with the right consistent with a distal branch occlusion. There is a left distal A2 occlusion.

## 2018-11-10 NOTE — PROGRESS NOTE ADULT - ASSESSMENT
86 year old woman with multiple vascular risk factors, including age and HTN was transferred from [facility] to [facility] for acute onset of right-sided weakness. She was noted to have developed acute onset of right-sided weakness and language disturbance. She was initially evaluated at [facility] through tele-stroke and was treated with IV tPA. MRI brain showed left CYNDEE and MCA distribution infarct with mild hemorrhagic transformation (HI 1).     Impression: Cerebral embolism with cerebral infarction. Left ICA (MCA+CYNDEE) distribution stroke - likely etiology embolic stroke of unknown source.    NEURO: Neurologically without acute change- remains with global aphasia and right hemiplegia. Bedbound with functional quadriplegia. Continue monitoring for neurologic deterioration, permissive HTN with slow titration to normotensive, Admission -  will d/c Lipitor, ischemic infarct not related to atherosclerosis,  ending Physical therapy recommend AR. Palliative team contacted to discuss goals of care (MOLST Form, DNR, hospice)-  family meeting done on 11/8 2pm, family decided code status DNR/DNI and are hopeful for rehab.     ANTITHROMBOTIC THERAPY: ASA for second stroke prevention.    PULMONARY: Protecting airway, saturating well     CARDIOVASCULAR: TTE to r/o cardiac source of embolism. Cardiac monitoring no events;                           SBP goal: gradual normotension    GASTROINTESTINAL: dysphagia screen failed. Pt made NPO, s/p MBS: with recommendations for Dysphagia 1 diet, tolerating well     Diet: Dysphagia 1 honey    RENAL: BUN/Cr  without acute change, urinary retention: 600ml retention, straight cath PRN     Na Goal: Greater than 135     Matias: N    HEMATOLOGY: no signs or symptoms of bleeding     DVT ppx:  LMWH     ID: afebrile, no leukocytosis, no overt sign of infection    OTHER: above-mentioned plan was discussed with patient and available family members at bedside in detail. All the questions were answered and concerns were addressed.    DISPOSITION: Poor prognosis for meaningful recovery. Discuss goals of care with family member available at bedside. Palliative consult. Rehab once workup is complete if within GOC.     CORE MEASURES:        Admission NIHSS: 22     TPA: YES       LDL/HDL: 104/54     Depression Screen: NA due to global aphasia     Statin Therapy: yes     Dysphagia Screen: FAIL     Smoking  NO      Afib NO     Stroke Education  YES to family

## 2018-11-11 LAB
ANION GAP SERPL CALC-SCNC: 9 MMOL/L — SIGNIFICANT CHANGE UP (ref 5–17)
BUN SERPL-MCNC: 18 MG/DL — SIGNIFICANT CHANGE UP (ref 7–23)
CALCIUM SERPL-MCNC: 10.2 MG/DL — SIGNIFICANT CHANGE UP (ref 8.4–10.5)
CHLORIDE SERPL-SCNC: 105 MMOL/L — SIGNIFICANT CHANGE UP (ref 96–108)
CO2 SERPL-SCNC: 25 MMOL/L — SIGNIFICANT CHANGE UP (ref 22–31)
CREAT SERPL-MCNC: 0.76 MG/DL — SIGNIFICANT CHANGE UP (ref 0.5–1.3)
GLUCOSE SERPL-MCNC: 92 MG/DL — SIGNIFICANT CHANGE UP (ref 70–99)
HCT VFR BLD CALC: 35.6 % — SIGNIFICANT CHANGE UP (ref 34.5–45)
HGB BLD-MCNC: 12.1 G/DL — SIGNIFICANT CHANGE UP (ref 11.5–15.5)
MCHC RBC-ENTMCNC: 33.2 PG — SIGNIFICANT CHANGE UP (ref 27–34)
MCHC RBC-ENTMCNC: 33.9 GM/DL — SIGNIFICANT CHANGE UP (ref 32–36)
MCV RBC AUTO: 97.7 FL — SIGNIFICANT CHANGE UP (ref 80–100)
PLATELET # BLD AUTO: 233 K/UL — SIGNIFICANT CHANGE UP (ref 150–400)
POTASSIUM SERPL-MCNC: 4.2 MMOL/L — SIGNIFICANT CHANGE UP (ref 3.5–5.3)
POTASSIUM SERPL-SCNC: 4.2 MMOL/L — SIGNIFICANT CHANGE UP (ref 3.5–5.3)
RBC # BLD: 3.65 M/UL — LOW (ref 3.8–5.2)
RBC # FLD: 12.4 % — SIGNIFICANT CHANGE UP (ref 10.3–14.5)
SODIUM SERPL-SCNC: 139 MMOL/L — SIGNIFICANT CHANGE UP (ref 135–145)
WBC # BLD: 5.2 K/UL — SIGNIFICANT CHANGE UP (ref 3.8–10.5)
WBC # FLD AUTO: 5.2 K/UL — SIGNIFICANT CHANGE UP (ref 3.8–10.5)

## 2018-11-11 RX ADMIN — LOSARTAN POTASSIUM 100 MILLIGRAM(S): 100 TABLET, FILM COATED ORAL at 05:39

## 2018-11-11 RX ADMIN — Medication 81 MILLIGRAM(S): at 12:07

## 2018-11-11 RX ADMIN — Medication 20 MILLIGRAM(S): at 12:07

## 2018-11-11 RX ADMIN — ENOXAPARIN SODIUM 40 MILLIGRAM(S): 100 INJECTION SUBCUTANEOUS at 12:07

## 2018-11-11 NOTE — PROGRESS NOTE ADULT - SUBJECTIVE AND OBJECTIVE BOX
THE PATIENT WAS SEEN AND EXAMINED BY ME WITH THE HOUSESTAFF AND STROKE TEAM DURING MORNING ROUNDS.   HPI:  85 yo right-handed Swedish speaking woman who presented to [facility] as a transfer from [facility] for new and acute right-sided weakness and language disturbance (LWK 5-530pm, 11/2/18). ROS also revealed non-verbal, unresponsiveness. Otherwise limited due to patient's current cognitive state. Pertinent hx includes HTN and "heart condition" (unspecified w/ family at bedside). Patient's initial NIHSS score worsened to 28 upon arrival to [facility], was given tPA (1859pm, 11/2/18) and subsequently transferred for possible endovascular intervention. Patient's on arrival to [facility] ER NIHSS 22, pre-MRS 0, no M1 occlusion.     THE PATIENT WAS SEEN AND EXAMINED BY ME WITH THE HOUSESTAFF AND STROKE TEAM DURING MORNING ROUNDS.   HPI:  85 yo right-handed Swedish speaking woman who presents to [facility] as a transfer from [facility] for new and acute right-sided weakness and language disturbance (LWK 5-530pm, 11/2/18). ROS also revealed non-verbal, unresponsiveness. Otherwise limited due to patient's current cognitive state. Pertinent hx includes HTN and "heart condition" (unspecified w/ family at bedside). Patient's initial NIHSS score worsened to 28 upon arrival to [facility], was given tPA (1859pm, 11/2/18) and subsequently transferred for possible endovascular intervention. Patient's current NIHSS 22, pre-MRS 0, no M1 occlusion. (02 Nov 2018 22:01)      SUBJECTIVE: No events overnight.  No new neurologic complaints.      aspirin  chewable 81 milliGRAM(s) Oral daily  enoxaparin Injectable 40 milliGRAM(s) SubCutaneous daily  FLUoxetine 20 milliGRAM(s) Oral daily  influenza   Vaccine 0.5 milliLiter(s) IntraMuscular once  losartan 100 milliGRAM(s) Oral daily    PHYSICAL EXAM:   Vital Signs Last 24 Hrs  T(C): 36.8 (11 Nov 2018 04:51), Max: 36.9 (10 Nov 2018 23:44)  T(F): 98.2 (11 Nov 2018 04:51), Max: 98.4 (10 Nov 2018 23:44)  HR: 63 (11 Nov 2018 04:51) (62 - 68)  BP: 126/66 (11 Nov 2018 04:51) (111/68 - 146/70)  RR: 18 (11 Nov 2018 04:51) (16 - 18)  SpO2: 97% (11 Nov 2018 04:51) (95% - 97%)    General: No acute distress  HEENT:  right gaze palsy, RHH  Abdomen: Soft, nontender, nondistended   Extremities: No edema    NEUROLOGICAL EXAM:  Mental status: Awake, alert, mute, globally aphasic, able to follow some simple commands, unable to mimic, left hemineglect   Cranial Nerves: right facial droop, right gaze palsy, RHH  Motor exam: right hypotonic hemiplegia and was able to move LUE and LLE against gravity without noticeable weakness   Sensation: grimaces to noxious stimuli  Coordination/ Gait: unable to participate with exam due to aphasic     LABS:                        12.1   5.2   )-----------( 233      ( 11 Nov 2018 06:34 )             35.6    11-11    139  |  105  |  18  ----------------------------<  92  4.2   |  25  |  0.76    Ca    10.2      11 Nov 2018 06:34      Hemoglobin A1C, Whole Blood: 5.4 % (11-03 @ 05:06)      IMAGING: Reviewed by me.     Brain MRI (11/03/18) left CYNDEE and left MCA territory infarcts with small amount of cortical hemorrhage in the left MCA infarct    Head CT, CT perfusion, CTA head/Neck  (11/03/18) Atrophy. 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     CTA HEAD (11/03/18): demonstrates decreased number of left middle cerebral artery branches in the sylvian fissure compared with the right consistent with a distal branch occlusion. There is a left distal A2 occlusion. THE PATIENT WAS SEEN AND EXAMINED BY ME WITH THE HOUSESTAFF AND STROKE TEAM DURING MORNING ROUNDS.   HPI:  85 yo right-handed Puerto Rican speaking woman who presented to [facility] as a transfer from [facility] for new and acute right-sided weakness and language disturbance (LWK 5-530pm, 11/2/18). ROS also revealed non-verbal, unresponsiveness. Otherwise limited due to patient's current cognitive state. Pertinent hx includes HTN and "heart condition" (unspecified w/ family at bedside). Patient's initial NIHSS score worsened to 28 upon arrival to [facility], was given tPA (1859pm, 11/2/18) and subsequently transferred for possible endovascular intervention. Patient's on arrival to [facility] ER NIHSS 22, pre-MRS 0, no M1 occlusion.     THE PATIENT WAS SEEN AND EXAMINED BY ME WITH THE HOUSESTAFF AND STROKE TEAM DURING MORNING ROUNDS.   HPI:  85 yo right-handed Puerto Rican speaking woman who presents to [facility] as a transfer from [facility] for new and acute right-sided weakness and language disturbance (LWK 5-530pm, 11/2/18). ROS also revealed non-verbal, unresponsiveness. Otherwise limited due to patient's current cognitive state. Pertinent hx includes HTN and "heart condition" (unspecified w/ family at bedside). Patient's initial NIHSS score worsened to 28 upon arrival to [facility], was given tPA (1859pm, 11/2/18) and subsequently transferred for possible endovascular intervention. Patient's current NIHSS 22, pre-MRS 0, no M1 occlusion. (02 Nov 2018 22:01)    SUBJECTIVE: No events overnight.  No new neurologic complaints.      aspirin  chewable 81 milliGRAM(s) Oral daily  enoxaparin Injectable 40 milliGRAM(s) SubCutaneous daily  FLUoxetine 20 milliGRAM(s) Oral daily  influenza   Vaccine 0.5 milliLiter(s) IntraMuscular once  losartan 100 milliGRAM(s) Oral daily    PHYSICAL EXAM:   Vital Signs Last 24 Hrs  T(C): 36.8 (11 Nov 2018 04:51), Max: 36.9 (10 Nov 2018 23:44)  T(F): 98.2 (11 Nov 2018 04:51), Max: 98.4 (10 Nov 2018 23:44)  HR: 63 (11 Nov 2018 04:51) (62 - 68)  BP: 126/66 (11 Nov 2018 04:51) (111/68 - 146/70)  RR: 18 (11 Nov 2018 04:51) (16 - 18)  SpO2: 97% (11 Nov 2018 04:51) (95% - 97%)    General: No acute distress  HEENT:  right gaze palsy, RHH  Abdomen: Soft, nontender, nondistended   Extremities: No edema    NEUROLOGICAL EXAM:  Mental status: Awake, alert, mute, globally aphasic, able to follow some simple commands, unable to mimic, left hemineglect   Cranial Nerves: right facial droop, right gaze palsy, RHH  Motor exam: right hypotonic hemiplegia and was able to move LUE and LLE against gravity without noticeable weakness   Sensation: grimaces to noxious stimuli  Coordination/ Gait: unable to participate with exam due to aphasic     LABS:                        12.1   5.2   )-----------( 233      ( 11 Nov 2018 06:34 )             35.6    11-11    139  |  105  |  18  ----------------------------<  92  4.2   |  25  |  0.76    Ca    10.2      11 Nov 2018 06:34      Hemoglobin A1C, Whole Blood: 5.4 % (11-03 @ 05:06)      IMAGING: Reviewed by me.     Brain MRI (11/03/18) left CYNDEE and left MCA territory infarcts with small amount of cortical hemorrhage in the left MCA infarct    Head CT, CT perfusion, CTA head/Neck  (11/03/18) Atrophy. 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     CTA HEAD (11/03/18): demonstrates decreased number of left middle cerebral artery branches in the sylvian fissure compared with the right consistent with a distal branch occlusion. There is a left distal A2 occlusion.

## 2018-11-11 NOTE — PROGRESS NOTE ADULT - ASSESSMENT
86 year old woman with multiple vascular risk factors, including age and HTN was transferred from [facility] to [facility] for acute onset of right-sided weakness. She was noted to have developed acute onset of right-sided weakness and language disturbance. She was initially evaluated at [facility] through tele-stroke and was treated with IV tPA. MRI brain showed left CYNDEE and MCA distribution infarct with mild hemorrhagic transformation (HI 1). Impression: Cerebral embolism with cerebral infarction. Left ICA (MCA+CYNDEE) distribution stroke - likely etiology embolic stroke of unknown source.    NEURO: Neurologically without acute change- remains with global aphasia and right hemiplegia. Bedbound with functional quadriplegia. Continue monitoring for neurologic deterioration, permissive HTN with slow titration to normotensive, Admission -  will d/c Lipitor, ischemic infarct not related to atherosclerosis,  ending Physical therapy recommend AR. Palliative team contacted to discuss goals of care (MOLST Form, DNR, hospice)-  family meeting done on 11/8 2pm, family decided code status DNR/DNI and are hopeful for rehab.     ANTITHROMBOTIC THERAPY: ASA for second stroke prevention.    PULMONARY: Protecting airway, saturating well     CARDIOVASCULAR: TTE to r/o cardiac source of embolism. Cardiac monitoring with no recorded events.                           SBP goal: gradual normotension    GASTROINTESTINAL: dysphagia screen failed. Pt made NPO, s/p MBS: with recommendations for Dysphagia 1 diet, tolerating well     Diet: Dysphagia 1 honey    RENAL: BUN/Cr previously without acute change, urinary retention: 600ml retention, straight cath PRN     Na Goal: Greater than 135     Matias: N    HEMATOLOGY: no signs or symptoms of bleeding     DVT ppx:  LMWH     ID: afebrile, no leukpcytosis, no overt sign of infection    OTHER: above-mentioned plan was discussed with patient and available family members at bedside in detail. All the questions were answered and concerns were addressed.    DISPOSITION: Poor prognosis for meaningful recovery. Discuss goals of care with family member available at bedside. Palliative consult. Rehab once workup is complete if within GOC.     CORE MEASURES:        Admission NIHSS: 22     TPA: YES       LDL/HDL: 104/54     Depression Screen: NA due to global aphasia     Statin Therapy: yes     Dysphagia Screen: FAIL     Smoking  NO      Afib NO     Stroke Education  YES to family

## 2018-11-11 NOTE — PROGRESS NOTE ADULT - SUBJECTIVE AND OBJECTIVE BOX
Subjective: Patient seen and examined. No new events except as noted.   resting comfortably in bed     REVIEW OF SYSTEMS:    Unable  to obtain     MEDICATIONS:  MEDICATIONS  (STANDING):  aspirin  chewable 81 milliGRAM(s) Oral daily  enoxaparin Injectable 40 milliGRAM(s) SubCutaneous daily  FLUoxetine 20 milliGRAM(s) Oral daily  influenza   Vaccine 0.5 milliLiter(s) IntraMuscular once  losartan 100 milliGRAM(s) Oral daily      PHYSICAL EXAM:   Vital Signs Last 24 Hrs  T(C): 36.8 (10 Nov 2018 04:52), Max: 37.2 (09 Nov 2018 11:47)  T(F): 98.3 (10 Nov 2018 04:52), Max: 98.9 (09 Nov 2018 11:47)  HR: 63 (10 Nov 2018 04:52) (54 - 93)  BP: 148/69 (10 Nov 2018 04:52) (114/61 - 148/69)  RR: 18 (10 Nov 2018 04:52) (18 - 18)  SpO2: 96% (10 Nov 2018 04:52) (95% - 99%)            Appearance: NAD aphasic 	  HEENT:   Normal oral mucosa, PERRL, EOMI	  Lymphatic: No lymphadenopathy , no edema  Cardiovascular: regular S1 S2, No JVD, No murmurs , Peripheral pulses palpable 2+ bilaterally  Respiratory: Lungs clear to auscultation, normal effort 	  Gastrointestinal:  Soft, Non-tender, + BS	  Skin: No rashes, No ecchymoses, No cyanosis, warm to touch  Musculoskeletal: decreased  range of motion and strength  Psychiatry:  Mood & affect appropriate  Ext: No edema  NEUROLOGICAL EXAM:  Mental status: Awake, alert, mute, globally aphasic, able to follow some simple commands, unable to mimic, left hemineglect   Cranial Nerves: right facial droop, right gaze palsy, [facility]  Motor exam: right hypotonic hemiplegia and was able to move LUE and LLE against gravity without noticeable weakness   Sensation: grimaces to noxious stimuli  Coordination/ Gait: unable to participate with exam due to aphasic     LABS:    CARDIAC MARKERS:                                12.1   5.2   )-----------( 233      ( 11 Nov 2018 06:34 )             35.6     11-11    139  |  105  |  18  ----------------------------<  92  4.2   |  25  |  0.76    Ca    10.2      11 Nov 2018 06:34      proBNP:   Lipid Profile:   HgA1c:   TSH:             TELEMETRY: 	    ECG:  	  RADIOLOGY:   DIAGNOSTIC TESTING:  [ ] Echocardiogram:  [ ]  Catheterization:  [ ] Stress Test:    OTHER:

## 2018-11-12 ENCOUNTER — TRANSCRIPTION ENCOUNTER (OUTPATIENT)
Age: 83
End: 2018-11-12

## 2018-11-12 PROCEDURE — 99222 1ST HOSP IP/OBS MODERATE 55: CPT

## 2018-11-12 PROCEDURE — 93306 TTE W/DOPPLER COMPLETE: CPT | Mod: 26

## 2018-11-12 RX ADMIN — Medication 81 MILLIGRAM(S): at 13:31

## 2018-11-12 RX ADMIN — LOSARTAN POTASSIUM 100 MILLIGRAM(S): 100 TABLET, FILM COATED ORAL at 05:32

## 2018-11-12 RX ADMIN — Medication 20 MILLIGRAM(S): at 13:31

## 2018-11-12 RX ADMIN — ENOXAPARIN SODIUM 40 MILLIGRAM(S): 100 INJECTION SUBCUTANEOUS at 13:31

## 2018-11-12 NOTE — PROGRESS NOTE ADULT - SUBJECTIVE AND OBJECTIVE BOX
THE PATIENT WAS SEEN AND EXAMINED BY ME WITH THE HOUSESTAFF AND STROKE TEAM DURING MORNING ROUNDS.   HPI:  85 yo right-handed Welsh speaking woman who presented to [facility] as a transfer from [facility] for new and acute right-sided weakness and language disturbance (LWK 5-530pm, 11/2/18). ROS also revealed non-verbal, unresponsiveness. Otherwise limited due to patient's current cognitive state. Pertinent hx includes HTN and "heart condition" (unspecified w/ family at bedside). Patient's initial NIHSS score worsened to 28 upon arrival to [facility], was given tPA (1859pm, 11/2/18) and subsequently transferred for possible endovascular intervention. Patient's on arrival to [facility] ER NIHSS 22, pre-MRS 0, no M1 occlusion.     SUBJECTIVE: No events overnight.  No new neurologic complaints.      aspirin  chewable 81 milliGRAM(s) Oral daily  enoxaparin Injectable 40 milliGRAM(s) SubCutaneous daily  FLUoxetine 20 milliGRAM(s) Oral daily  influenza   Vaccine 0.5 milliLiter(s) IntraMuscular once  losartan 100 milliGRAM(s) Oral daily    PHYSICAL EXAM:   Vital Signs Last 24 Hrs  T(C): 37.2 (12 Nov 2018 04:49), Max: 37.2 (11 Nov 2018 23:54)  T(F): 99 (12 Nov 2018 04:49), Max: 99 (11 Nov 2018 23:54)  HR: 74 (12 Nov 2018 04:49) (58 - 77)  BP: 119/66 (12 Nov 2018 04:49) (107/64 - 125/67)  RR: 18 (12 Nov 2018 04:49) (18 - 18)  SpO2: 97% (12 Nov 2018 04:49) (95% - 97%)    General: No acute distress  HEENT:  right gaze palsy, RHH  Abdomen: Soft, nontender, nondistended   Extremities: No edema    NEUROLOGICAL EXAM:  Mental status: Awake, alert, mute, globally aphasic, able to follow some simple commands, unable to mimic, left hemineglect   Cranial Nerves: right facial droop, right gaze palsy, RHH  Motor exam: right hypotonic hemiplegia and was able to move LUE and LLE against gravity without noticeable weakness   Sensation: grimaces to noxious stimuli  Coordination/ Gait: unable to participate with exam due to aphasic     LABS:                        12.1   5.2   )-----------( 233      ( 11 Nov 2018 06:34 )             35.6    11-11    139  |  105  |  18  ----------------------------<  92  4.2   |  25  |  0.76    Ca    10.2      11 Nov 2018 06:34    Hemoglobin A1C, Whole Blood: 5.4 % (11-03 @ 05:06)    IMAGING: Reviewed by me.     Brain MRI (11/03/18) left CYNDEE and left MCA territory infarcts with small amount of cortical hemorrhage in the left MCA infarct    Head CT, CT perfusion, CTA head/Neck  (11/03/18) Atrophy. 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     CTA HEAD (11/03/18): demonstrates decreased number of left middle cerebral artery branches in the sylvian fissure compared with the right consistent with a distal branch occlusion. There is a left distal A2 occlusion.

## 2018-11-12 NOTE — DISCHARGE NOTE ADULT - CARE PROVIDER_API CALL
Regina Carty (NP; RN), NP in Family Health  [address] [address]  Phone: [phone]  Fax: [phone]

## 2018-11-12 NOTE — PROGRESS NOTE ADULT - SUBJECTIVE AND OBJECTIVE BOX
Subjective: Patient seen and examined. No new events except as noted.   remains aphasic   sitting in chair       REVIEW OF SYSTEMS:    Unable to obtain     MEDICATIONS:  MEDICATIONS  (STANDING):  aspirin  chewable 81 milliGRAM(s) Oral daily  enoxaparin Injectable 40 milliGRAM(s) SubCutaneous daily  FLUoxetine 20 milliGRAM(s) Oral daily  influenza   Vaccine 0.5 milliLiter(s) IntraMuscular once  losartan 100 milliGRAM(s) Oral daily      PHYSICAL EXAM:  T(C): 37.2 (11-12-18 @ 04:49), Max: 37.2 (11-11-18 @ 23:54)  HR: 74 (11-12-18 @ 04:49) (58 - 77)  BP: 119/66 (11-12-18 @ 04:49) (107/64 - 125/67)  RR: 18 (11-12-18 @ 04:49) (18 - 18)  SpO2: 97% (11-12-18 @ 04:49) (95% - 97%)  Wt(kg): --  I&O's Summary    11 Nov 2018 07:01  -  12 Nov 2018 07:00  --------------------------------------------------------  IN: 860 mL / OUT: 0 mL / NET: 860 mL          Appearance: NAD aphasic 	  HEENT:   Normal oral mucosa, PERRL, EOMI	  Lymphatic: No lymphadenopathy , no edema  Cardiovascular: regular S1 S2, No JVD, No murmurs , Peripheral pulses palpable 2+ bilaterally  Respiratory: Lungs clear to auscultation, normal effort 	  Gastrointestinal:  Soft, Non-tender, + BS	  Skin: No rashes, No ecchymoses, No cyanosis, warm to touch  Musculoskeletal: decreased  range of motion and strength  Psychiatry:  Mood & affect appropriate  Ext: No edema  NEUROLOGICAL EXAM:  Mental status: Awake, alert, mute, globally aphasic, able to follow some simple commands, unable to mimic, left hemineglect   Cranial Nerves: right facial droop, right gaze palsy, RHH  Motor exam: right hypotonic hemiplegia and was able to move LUE and LLE against gravity without noticeable weakness   Sensation: grimaces to noxious stimuli  Coordination/ Gait: unable to participate with exam due to aphasic       LABS:    CARDIAC MARKERS:                                12.1   5.2   )-----------( 233      ( 11 Nov 2018 06:34 )             35.6     11-11    139  |  105  |  18  ----------------------------<  92  4.2   |  25  |  0.76    Ca    10.2      11 Nov 2018 06:34      proBNP:   Lipid Profile:   HgA1c:   TSH:             TELEMETRY: 	SR    ECG:  	  RADIOLOGY:   DIAGNOSTIC TESTING:  [ ] Echocardiogram:    [ ]  Catheterization:  [ ] Stress Test:    OTHER:

## 2018-11-12 NOTE — DISCHARGE NOTE ADULT - HOSPITAL COURSE
86 year old woman with multiple vascular risk factors, including age and HTN was transferred from [facility] to [facility] for acute onset of right-sided weakness. She was noted to have developed acute onset of right-sided weakness and language disturbance. She was initially evaluated at [facility] through tele-stroke and was treated with IV tPA (Admission NIHSS 22). MRI brain showed left CYNDEE and MCA distribution infarct with mild hemorrhagic transformation (HI 1). Impression: Cerebral embolism with cerebral infarction. Left ICA (MCA+CYNDEE) distribution stroke - likely etiology embolic stroke of unknown source.    During hospital stay was neurologically without acute change: remains with global aphasia, right hemiplegia and bedbound due to functional quadriplegia. Permissive hypertension was slowly titrated to normotension. Course was complicated by urinary retention which was managed with PRN straight catheterization. MBS was performed and patient was placed on a Dysphagia 1 honey diet which has been well-tolerated. Lipitor discontinued due to admission LDL of 104. Physical therapy recommend AR. Family meeting to discuss goals of care done on 11/8 2pm, family decided code status DNR/DNI and are hopeful for rehab. Cardiac monitoring revealed no events. Recommend continued ASA for secondary stroke prevention and cardiology follow up of TTE results in outpatient or rehab setting. 86 year old woman with multiple vascular risk factors, including age and HTN was transferred from [facility] to [facility] for acute onset of right-sided weakness. She was noted to have developed acute onset of right-sided weakness and language disturbance. She was initially evaluated at [facility] through tele-stroke and was treated with IV tPA (Admission NIHSS 22). MRI brain showed left CYNDEE and MCA distribution infarct with mild hemorrhagic transformation (HI 1). Impression: Cerebral embolism with cerebral infarction. Left ICA (MCA+CYNDEE) distribution stroke - likely etiology embolic stroke of unknown source.    During hospital stay was neurologically without acute change: remains with global aphasia, right hemiplegia and bedbound due to functional quadriplegia. Permissive hypertension was slowly titrated to normotension. Course was complicated by urinary retention which was managed with PRN straight catheterization. MBS was performed and patient was placed on a Dysphagia 1 honey diet which has been well-tolerated. Lipitor discontinued due to admission LDL of 104. Physical therapy recommend AR. Family meeting to discuss goals of care done on 11/8 2pm, family decided code status DNR/DNI and are hopeful for rehab. Cardiac monitoring revealed no events. TTE shows EF 74% no evidence of thrombus or PFO, plan for ILR to screen for occult arrythmia-EP following. Cardiac monitoring with no recorded events.         For diet she initially failed dysphagia screen, however MBS results were substantially better, she was placed on dysphagia 1 diet and tolerating well.

## 2018-11-12 NOTE — DISCHARGE NOTE ADULT - CARE PLAN
Principal Discharge DX:	Cerebrovascular accident (CVA) due to embolism of right middle cerebral artery  Goal:	prevention and treatment with rehab  Assessment and plan of treatment:	- rehab  - follow up with stroke neurologist and stroke NP  Secondary Diagnosis:	Essential hypertension

## 2018-11-12 NOTE — CONSULT NOTE ADULT - SUBJECTIVE AND OBJECTIVE BOX
CHIEF COMPLAINT:    HISTORY OF PRESENT ILLNESS:      Allergies    No Known Allergies    Intolerances    	    MEDICATIONS:  aspirin  chewable 81 milliGRAM(s) Oral daily  enoxaparin Injectable 40 milliGRAM(s) SubCutaneous daily  losartan 100 milliGRAM(s) Oral daily        FLUoxetine 20 milliGRAM(s) Oral daily        influenza   Vaccine 0.5 milliLiter(s) IntraMuscular once      PAST MEDICAL & SURGICAL HISTORY:  HTN (hypertension)  S/P appendectomy      FAMILY HISTORY:  No pertinent family history in first degree relatives      SOCIAL HISTORY:    [ ]Non-smoker  [ ] Smoker  [ ] Alcohol      REVIEW OF SYSTEMS:  See HPI. Otherwise, 10 point ROS done and otherwise negative.    PHYSICAL EXAM:  T(C): 36.6 (11-12-18 @ 16:16), Max: 37.2 (11-11-18 @ 23:54)  HR: 59 (11-12-18 @ 16:16) (59 - 78)  BP: 126/70 (11-12-18 @ 16:16) (107/64 - 126/70)  RR: 18 (11-12-18 @ 16:16) (18 - 18)  SpO2: 97% (11-12-18 @ 16:16) (95% - 98%)  Wt(kg): --  I&O's Summary    11 Nov 2018 07:01  -  12 Nov 2018 07:00  --------------------------------------------------------  IN: 860 mL / OUT: 0 mL / NET: 860 mL    12 Nov 2018 07:01  -  12 Nov 2018 16:58  --------------------------------------------------------  IN: 720 mL / OUT: 0 mL / NET: 720 mL        Appearance: Alert. NAD	  HEENT:   NC/AT	  Cardiovascular: +S1S2 RRR no m/g/r  Respiratory: CTA B/L	  Psychiatry: A & O x 3, Mood & affect appropriate  Gastrointestinal:  Soft, NT.ND., + BS	  Skin: No rashes	  Neurologic: Non-focal  Extremities: No edema BLE  Vascular: Peripheral pulses palpable 2+ bilaterally      LABS:	 	    CBC Full  -  ( 11 Nov 2018 06:34 )  WBC Count : 5.2 K/uL  Hemoglobin : 12.1 g/dL  Hematocrit : 35.6 %  Platelet Count - Automated : 233 K/uL  Mean Cell Volume : 97.7 fl  Mean Cell Hemoglobin : 33.2 pg  Mean Cell Hemoglobin Concentration : 33.9 gm/dL  Auto Neutrophil # : x  Auto Lymphocyte # : x  Auto Monocyte # : x  Auto Eosinophil # : x  Auto Basophil # : x  Auto Neutrophil % : x  Auto Lymphocyte % : x  Auto Monocyte % : x  Auto Eosinophil % : x  Auto Basophil % : x    11-11    139  |  105  |  18  ----------------------------<  92  4.2   |  25  |  0.76    Ca    10.2      11 Nov 2018 06:34    ECG:  SR @ 73bpm; LBBB; OR 200ms; QRSd: 146ms	      PREVIOUS DIAGNOSTIC TESTING:    Echocardiogram:    Catheterization:    Stress Test:  	  	  ASSESSMENT/PLAN: CHIEF COMPLAINT:    HISTORY OF PRESENT ILLNESS:  87y/o female h/o HTN, LBBB p/w left CYNDEE & MCA CVA s/p tPA w/ residual aphasia. Patient was on telemetry during earlier hospitalization which showed no arrhythmias/AF.    Allergies  No Known Allergies    MEDICATIONS:  aspirin  chewable 81 milliGRAM(s) Oral daily  enoxaparin Injectable 40 milliGRAM(s) SubCutaneous daily  losartan 100 milliGRAM(s) Oral daily  FLUoxetine 20 milliGRAM(s) Oral daily  influenza   Vaccine 0.5 milliLiter(s) IntraMuscular once      PAST MEDICAL & SURGICAL HISTORY:  HTN (hypertension)  S/P appendectomy      FAMILY HISTORY:  No pertinent family history in first degree relatives      SOCIAL HISTORY:    No toxic habits    REVIEW OF SYSTEMS:  See HPI. Otherwise, 10 point ROS done and otherwise negative.    PHYSICAL EXAM:  T(C): 36.6 (11-12-18 @ 16:16), Max: 37.2 (11-11-18 @ 23:54)  HR: 59 (11-12-18 @ 16:16) (59 - 78)  BP: 126/70 (11-12-18 @ 16:16) (107/64 - 126/70)  RR: 18 (11-12-18 @ 16:16) (18 - 18)  SpO2: 97% (11-12-18 @ 16:16) (95% - 98%)  Wt(kg): --  I&O's Summary    11 Nov 2018 07:01  -  12 Nov 2018 07:00  --------------------------------------------------------  IN: 860 mL / OUT: 0 mL / NET: 860 mL    12 Nov 2018 07:01  -  12 Nov 2018 16:58  --------------------------------------------------------  IN: 720 mL / OUT: 0 mL / NET: 720 mL        Appearance: Alert. NAD	  HEENT:   NC/AT	  Cardiovascular: +S1S2 RRR no m/g/r  Respiratory: CTA B/L	  Gastrointestinal:  Soft, NT.ND., + BS	  Skin: No rashes	  Neurologic: Aphasic  Extremities: No edema BLE  Vascular: Peripheral pulses palpable 2+ bilaterally      LABS:	 	    CBC Full  -  ( 11 Nov 2018 06:34 )  WBC Count : 5.2 K/uL  Hemoglobin : 12.1 g/dL  Hematocrit : 35.6 %  Platelet Count - Automated : 233 K/uL  Mean Cell Volume : 97.7 fl  Mean Cell Hemoglobin : 33.2 pg  Mean Cell Hemoglobin Concentration : 33.9 gm/dL    11-11    139  |  105  |  18  ----------------------------<  92  4.2   |  25  |  0.76    Ca    10.2      11 Nov 2018 06:34    ECG:  SR @ 73bpm; LBBB; ID 200ms; QRSd: 146ms	    	  ASSESSMENT/PLAN: 	  87y/o female h/o HTN, LBBB p/w left CYNDEE & MCA CVA s/p tPA w/ residual aphasia.  --For Echo  --Pending Echo, for ILR implant. Spoke with pt's daughter in regards to ILR including risks, benefits, alternatives and post ILR instructions. Patient's daughter is on board. Possible ILR tomorrow pending Echo    Kristel Siddiqui PA-C  [id number]

## 2018-11-12 NOTE — DISCHARGE NOTE ADULT - MEDICATION SUMMARY - MEDICATIONS TO TAKE
I will START or STAY ON the medications listed below when I get home from the hospital:    Talco Aspirin Adult Chewable 81 mg oral tablet, chewable  -- 1 tab(s) by mouth once a day  -- Indication: For Stroke prevention    losartan 100 mg oral tablet  -- 1 tab(s) by mouth once a day  -- Indication: For HTN (hypertension)    FLUoxetine 20 mg oral capsule  -- 1 cap(s) by mouth once a day  -- Indication: For Depression

## 2018-11-12 NOTE — DISCHARGE NOTE ADULT - PATIENT PORTAL LINK FT
You can access the [facility], offered by [facility], by registering with the following website: [email or web address]

## 2018-11-12 NOTE — PROGRESS NOTE ADULT - ASSESSMENT
86 year old woman with multiple vascular risk factors, including age and HTN was transferred [facility] for acute onset of right-sided weakness. She was noted to have developed acute onset of right-sided weakness and language disturbance. She was initially evaluated at [facility] through tele-stroke and was treated with IV tPA. MRI brain showed left CYNDEE and MCA distribution infarct with mild hemorrhagic transformation (HI 1). Impression: Cerebral embolism with cerebral infarction. Left ICA (MCA+CYNDEE) distribution stroke - likely etiology embolic stroke of unknown source.    NEURO: Neurologically without acute change- remains with global aphasia and right hemiplegia. Bedbound with functional quadriplegia. Continue monitoring for neurologic deterioration, permissive HTN with slow titration to normotensive, Admission -  will d/c Lipitor, ischemic infarct not related to atherosclerosis, Physical therapy recommend AR. Palliative team contacted to discuss goals of care (MOLST Form, DNR, hospice)-  family meeting done on 11/8 2pm, family decided code status DNR/DNI and are hopeful for rehab.     ANTITHROMBOTIC THERAPY: ASA for second stroke prevention.    PULMONARY: Protecting airway, saturating well     CARDIOVASCULAR: TTE to r/o cardiac source of embolism. Cardiac monitoring with no recorded events.                           SBP goal: gradual normotension    GASTROINTESTINAL: dysphagia screen failed. Pt made NPO, s/p MBS: with recommendations for Dysphagia 1 diet, tolerating well     Diet: Dysphagia 1 honey    RENAL: BUN/Cr previously without acute change, urinary retention: 600ml retention, straight cath PRN     Na Goal: Greater than 135     Matias: N    HEMATOLOGY: no signs or symptoms of bleeding     DVT ppx:  LMWH     ID: afebrile, no overt sign of infection    OTHER: above-mentioned plan was discussed with patient and available family members at bedside in detail. All the questions were answered and concerns were addressed.    DISPOSITION: Poor prognosis for meaningful recovery.  Rehab once workup is complete as decided is within GOC. Awaiting authorization from insurance company.    CORE MEASURES:        Admission NIHSS: 22     TPA: YES       LDL/HDL: 104/54     Depression Screen: NA due to global aphasia     Statin Therapy: yes     Dysphagia Screen: FAIL     Smoking  NO      Afib NO     Stroke Education  YES to family 86 year old woman with multiple vascular risk factors, including age and HTN was [facility] for acute onset of right-sided weakness. She was noted to have developed acute onset of right-sided weakness and language disturbance. She was initially evaluated at [facility] through tele-stroke and was treated with IV tPA. MRI brain showed left CYNDEE and MCA distribution infarct with mild hemorrhagic transformation (HI 1). Impression: Cerebral embolism with cerebral infarction. Left ICA (MCA+CYNDEE) distribution stroke - likely etiology embolic stroke of unknown source.    NEURO: Neurologically without acute change- remains with global aphasia and right hemiplegia. Bedbound with functional quadriplegia. Continue monitoring for neurologic deterioration, permissive HTN with slow titration to normotensive, Admission -  will d/c Lipitor, ischemic infarct not related to atherosclerosis, Physical therapy recommend AR. Palliative team contacted to discuss goals of care (MOLST Form, DNR, hospice)-  family meeting done on 11/8 2pm, family decided code status DNR/DNI and are hopeful for rehab.     ANTITHROMBOTIC THERAPY: ASA for second stroke prevention.    PULMONARY: Protecting airway, saturating well     CARDIOVASCULAR: TTE to r/o cardiac source of embolism. plan for ILR to screen for occult arrythmia. If can not be done inpatient will be done as an outpatient. Cardiac monitoring with no recorded events.                           SBP goal: gradual normotension    GASTROINTESTINAL: dysphagia screen failed. Pt made NPO, s/p MBS: with recommendations for Dysphagia 1 diet, tolerating well     Diet: Dysphagia 1 honey    RENAL: BUN/Cr previously without acute change, urinary retention: 600ml retention, straight cath PRN     Na Goal: Greater than 135     Matias: N    HEMATOLOGY: no signs or symptoms of bleeding     DVT ppx:  LMWH     ID: afebrile, no overt sign of infection    OTHER: above-mentioned plan was discussed with patient and available family members at bedside in detail. All the questions were answered and concerns were addressed.    DISPOSITION: Poor prognosis for meaningful recovery.  Rehab once workup is complete as decided is within GOC. Awaiting authorization from insurance company.    CORE MEASURES:        Admission NIHSS: 22     TPA: YES       LDL/HDL: 104/54     Depression Screen: NA due to global aphasia     Statin Therapy: yes     Dysphagia Screen: FAIL     Smoking  NO      Afib NO     Stroke Education  YES to family

## 2018-11-13 PROBLEM — I10 ESSENTIAL (PRIMARY) HYPERTENSION: Chronic | Status: ACTIVE | Noted: 2018-11-02

## 2018-11-13 PROCEDURE — 99232 SBSQ HOSP IP/OBS MODERATE 35: CPT

## 2018-11-13 RX ORDER — CEFAZOLIN SODIUM 1 G
1000 VIAL (EA) INJECTION ONCE
Qty: 0 | Refills: 0 | Status: DISCONTINUED | OUTPATIENT
Start: 2018-11-13 | End: 2018-11-15

## 2018-11-13 RX ADMIN — ENOXAPARIN SODIUM 40 MILLIGRAM(S): 100 INJECTION SUBCUTANEOUS at 12:29

## 2018-11-13 RX ADMIN — Medication 20 MILLIGRAM(S): at 12:29

## 2018-11-13 RX ADMIN — Medication 81 MILLIGRAM(S): at 12:29

## 2018-11-13 RX ADMIN — LOSARTAN POTASSIUM 100 MILLIGRAM(S): 100 TABLET, FILM COATED ORAL at 06:36

## 2018-11-13 NOTE — PROGRESS NOTE ADULT - PROBLEM SELECTOR PLAN 2
Unable to speak, daughter Taylor, Sydney, son Rene and grandson Parviz at bedside
orders per stroke team  Awaiting TTE  aspiration precautions
orders per stroke team  aspiration precautions
orders per stroke team  aspiration precautions
orders per stroke team  requesting ILR to rule out PAF as etiology of her stroke. Awaiting TTE  aspiration precautions
orders per stroke team  requesting ILR to rule out PAF as etiology of her stroke. Awaiting TTE  aspiration precautions
orders per stroke unit team
Unable to speak, daughter Taylor and son Venkatesh at bedside.  Unsure if patient able to process simple questions with thumbs up or thumbs down .    Coaxing from family noted

## 2018-11-13 NOTE — PROGRESS NOTE ADULT - SUBJECTIVE AND OBJECTIVE BOX
Cc: R weakness, aphasia    HPI:   Patient tolerating therapies.  Max A with transfers and gait.    MEDICATIONS  (STANDING):  aspirin  chewable 81 milliGRAM(s) Oral daily  enoxaparin Injectable 40 milliGRAM(s) SubCutaneous daily  FLUoxetine 20 milliGRAM(s) Oral daily  influenza   Vaccine 0.5 milliLiter(s) IntraMuscular once  losartan 100 milliGRAM(s) Oral daily    MEDICATIONS  (PRN):    Vital Signs Last 24 Hrs  T(C): 36.7 (13 Nov 2018 08:32), Max: 37 (13 Nov 2018 04:51)  T(F): 98 (13 Nov 2018 08:32), Max: 98.6 (13 Nov 2018 04:51)  HR: 60 (13 Nov 2018 08:32) (59 - 65)  BP: 127/70 (13 Nov 2018 08:32) (114/58 - 127/70)  BP(mean): --  RR: 18 (13 Nov 2018 08:32) (18 - 18)  SpO2: 98% (13 Nov 2018 08:32) (97% - 99%)    PHYSICAL EXAM  Constitutional - NAD, Comfortable  HEENT - NCAT, EOMI  Extremities - No C/C/E, No calf tenderness   Neurologic Exam -                    Cognitive - Awake, Alert     Communication - + expressive aphasia     Motor - Right side 0/5     MAS 1+ extensor tone on the RLE        Psychiatric -Affect WNL      Impression:  87 yo with CVA with Right hemiparesis, aphasia, gait dysfunction      Plan:  PT- ROM, Bed Mob, Transfers, Amb w AD and bracing as needed  OT- ADLs, bracing  SLP- Dysphagia eval and treat  Prec- Falls, Cardiac  DVT Prophylaxis- lovenox  Skin- Turn q2 h  Dispo- Acute Rehab- can tolerate 3h/d PT/OT/SLP and requires daily physician visits  ELOS 5 wks  LTG- Min A with transfers/gait w ADs

## 2018-11-13 NOTE — PROGRESS NOTE ADULT - SUBJECTIVE AND OBJECTIVE BOX
THE PATIENT WAS SEEN AND EXAMINED BY ME WITH THE HOUSESTAFF AND STROKE TEAM DURING MORNING ROUNDS.   HPI:  87 yo right-handed [place] speaking woman who presented to [facility] as a transfer from [facility] for new and acute right-sided weakness and language disturbance (LWK 5-530pm, 11/2/18). ROS also revealed non-verbal, unresponsiveness. Otherwise limited due to patient's current cognitive state. Pertinent hx includes HTN and "heart condition" (unspecified w/ family at bedside). Patient's initial NIHSS score worsened to 28 upon arrival to [facility], was given tPA (1859pm, 11/2/18) and subsequently transferred for possible endovascular intervention. Patient's on arrival to [facility] ER NIHSS 22, pre-MRS 0, no M1 occlusion.     SUBJECTIVE: No events overnight.  No new neurologic complaints.      aspirin  chewable 81 milliGRAM(s) Oral daily  enoxaparin Injectable 40 milliGRAM(s) SubCutaneous daily  FLUoxetine 20 milliGRAM(s) Oral daily  influenza   Vaccine 0.5 milliLiter(s) IntraMuscular once  losartan 100 milliGRAM(s) Oral daily    PHYSICAL EXAM:   Vital Signs Last 24 Hrs  T(C): 37 (13 Nov 2018 04:51), Max: 37 (13 Nov 2018 04:51)  T(F): 98.6 (13 Nov 2018 04:51), Max: 98.6 (13 Nov 2018 04:51)  HR: 61 (13 Nov 2018 04:51) (59 - 78)  BP: 114/58 (13 Nov 2018 04:51) (113/61 - 126/70)  RR: 18 (13 Nov 2018 04:51) (18 - 18)  SpO2: 97% (13 Nov 2018 04:51) (97% - 99%)    General: No acute distress  HEENT:  right gaze palsy, RHH  Abdomen: Soft, nontender, nondistended   Extremities: No edema    NEUROLOGICAL EXAM:  Mental status: Awake, alert, mute, globally aphasic, able to follow some simple commands, unable to mimic, left hemineglect   Cranial Nerves: right facial droop, right gaze palsy, RHH  Motor exam: right hypotonic hemiplegia and was able to move LUE and LLE against gravity without noticeable weakness   Sensation: grimaces to noxious stimuli  Coordination/ Gait: unable to participate with exam due to aphasic     LABS: Hemoglobin A1C, Whole Blood: 5.4 % (11-03 @ 05:06)    IMAGING: Reviewed by me.     Brain MRI (11/03/18) left CYNDEE and left MCA territory infarcts with small amount of cortical hemorrhage in the left MCA infarct    Head CT, CT perfusion, CTA head/Neck  (11/03/18) Atrophy. 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     CTA HEAD (11/03/18): demonstrates decreased number of left middle cerebral artery branches in the sylvian fissure compared with the right consistent with a distal branch occlusion. There is a left distal A2 occlusion.

## 2018-11-13 NOTE — CHART NOTE - NSCHARTNOTEFT_GEN_A_CORE
Nutrition Follow Up Note  Patient seen for: follow up  Reason for Admission: right-sided weakness  87 yo right-handed Bahamian speaking woman who presented to [facility] as a transfer from [facility] for new and acute right-sided weakness and language disturbance (LWK 5-530pm, 11/2/18). ROS also revealed non-verbal, unresponsiveness. Otherwise limited due to patient's current cognitive state.   Patient's initial NIHSS score worsened to 28 upon arrival to [facility], was given tPA (1859pm, 11/2/18) and subsequently transferred for possible endovascular intervention. Patient's on arrival to [facility] ER NIHSS 22, pre-MRS 0, no M1 occlusion.   DISPOSITION: Poor prognosis for meaningful recovery.  Rehab once workup is complete as decided is within GOC. Awaiting authorization from insurance company.    Source: daughter and grandson at bedside, chart since 11/2/2018      Diet : Dysphagia 1 Honey Consistency as per MBS.     Patient reports: pt with residual aphasia     PO intake :>75%     Source for PO intake: grandson and daughter at bedside.          Daily: no current weight      Pertinent Medications: MEDICATIONS  (STANDING):  aspirin  chewable 81 milliGRAM(s) Oral daily  enoxaparin Injectable 40 milliGRAM(s) SubCutaneous daily  FLUoxetine 20 milliGRAM(s) Oral daily  influenza   Vaccine 0.5 milliLiter(s) IntraMuscular once  losartan 100 milliGRAM(s) Oral daily    MEDICATIONS  (PRN):    Pertinent Labs:   nutrition realted labs WNL since previous assess on 11/5        Hemoglobin A1C, Whole Blood: 5.4 % (11-03 @ 05:06)            Skin per nursing documentation: no pressure ulcers  Edema: no edema    Estimated Needs:   [x ] no change since previous assessment  [ ] recalculated:     Previous Nutrition Diagnosis:  Swallowing difficulty  Nutrition Diagnosis is: ongoing and being addressed with modified texture diet            Recommend  1)continue dysphagia 1 honey consistency  2)monitor need for low sodium diet, recommend keep diet liberalized at this time    Monitoring and Evaluation:     Continue to monitor Nutritional intake, Tolerance to diet prescription, weights, labs, skin integrity    RD remains available upon request and will follow up per protocol  Vandana De La Torre MA, RD, CDN #[id number] Nutrition Follow Up Note  Patient seen for: follow up  Reason for Admission: right-sided weakness  85 yo right-handed [place] speaking woman who presented to [facility] as a transfer from [facility] for new and acute right-sided weakness and language disturbance (LWK 5-530pm, 11/2/18). ROS also revealed non-verbal, unresponsiveness. Otherwise limited due to patient's current cognitive state.   Patient's initial NIHSS score worsened to 28 upon arrival to [facility], was given tPA (1859pm, 11/2/18) and subsequently transferred for possible endovascular intervention. Patient's on arrival to [facility] ER NIHSS 22, pre-MRS 0, no M1 occlusion.   DISPOSITION: Poor prognosis for meaningful recovery.  Rehab once workup is complete as decided is within GOC. Awaiting authorization from insurance company.    Source: daughter and grandson at bedside, chart since 11/2/2018  pt now with diarrhea, last BM yesterday. her abdomen has been distended.       Diet : Dysphagia 1 Honey Consistency as per MBS.     Patient reports: pt with residual aphasia     PO intake :>75%     Source for PO intake: grandson and daughter at bedside.          Daily: no current weight      Pertinent Medications: MEDICATIONS  (STANDING):  aspirin  chewable 81 milliGRAM(s) Oral daily  enoxaparin Injectable 40 milliGRAM(s) SubCutaneous daily  FLUoxetine 20 milliGRAM(s) Oral daily  influenza   Vaccine 0.5 milliLiter(s) IntraMuscular once  losartan 100 milliGRAM(s) Oral daily    MEDICATIONS  (PRN):    Pertinent Labs:   nutrition realted labs WNL since previous assess on 11/5        Hemoglobin A1C, Whole Blood: 5.4 % (11-03 @ 05:06)            Skin per nursing documentation: no pressure ulcers  Edema: no edema    Estimated Needs:   [x ] no change since previous assessment  [ ] recalculated:     Previous Nutrition Diagnosis:  Swallowing difficulty  Nutrition Diagnosis is: ongoing and being addressed with modified texture diet            Recommend  1)continue dysphagia 1 honey consistency  2)monitor need for low sodium diet, recommend keep diet liberalized at this time    Monitoring and Evaluation:     Continue to monitor Nutritional intake, Tolerance to diet prescription, weights, labs, skin integrity    RD remains available upon request and will follow up per protocol  Vandana De La Torre MA, RD, CDN #[id number]

## 2018-11-13 NOTE — PROGRESS NOTE ADULT - PROBLEM SELECTOR PROBLEM 3
Dysphagia
HTN (hypertension)
Dysphagia

## 2018-11-13 NOTE — PROGRESS NOTE ADULT - PROBLEM SELECTOR PROBLEM 1
Bradycardia
CVA (cerebral vascular accident)
CVA (cerebral vascular accident)

## 2018-11-13 NOTE — PROGRESS NOTE ADULT - PROBLEM SELECTOR PLAN 1
resolved   Monitor on tele
resolved and stable
resolved and stable   Monitor on tele
resolved and stable   Monitor on tele
·  Problem: CVA (cerebral vascular accident).  Plan: Acute infarct in the left anterior and middle cerebral   artery   Right hemiparesis with severe aphasia.
Acute infarct in the left anterior and middle cerebral   artery   Right hemiparesis with severe aphasia.

## 2018-11-13 NOTE — PROGRESS NOTE ADULT - ASSESSMENT
86 year old woman with multiple vascular risk factors, including age and HTN was transferred from [facility] to [facility] for acute onset of right-sided weakness. She was noted to have developed acute onset of right-sided weakness and language disturbance. She was initially evaluated at [facility] through tele-stroke and was treated with IV tPA. MRI brain showed left CYNDEE and MCA distribution infarct with mild hemorrhagic transformation (HI 1). Impression: Cerebral embolism with cerebral infarction. Left ICA (MCA+CYNDEE) distribution stroke - likely etiology embolic stroke of unknown source.    NEURO: Neurologically without acute change- remains with global aphasia and right hemiplegia. Bedbound with functional quadriplegia. Continue monitoring for neurologic deterioration, permissive HTN with slow titration to normotensive, Admission -  will d/c Lipitor, ischemic infarct not related to atherosclerosis, Physical therapy recommend AR. Palliative team contacted to discuss goals of care (MOLST Form, DNR, hospice)-  family meeting done on 11/8 2pm, family decided code status DNR/DNI and are hopeful for rehab.     ANTITHROMBOTIC THERAPY: ASA for second stroke prevention.    PULMONARY: Protecting airway, saturating well     CARDIOVASCULAR: TTE shows EF 74% no evidence of thrombus or PFO, plan for ILR to screen for occult arrythmia-EP following. Cardiac monitoring with no recorded events.                           SBP goal: gradual normotension    GASTROINTESTINAL: dysphagia screen failed. Pt made NPO, s/p MBS: with recommendations for Dysphagia 1 diet, tolerating well     Diet: Dysphagia 1 honey    RENAL: BUN/Cr previously without acute change, urinary retention: 600ml retention, straight cath PRN     Na Goal: Greater than 135     Matias: N    HEMATOLOGY: no signs or symptoms of bleeding     DVT ppx:  LMWH     ID: afebrile, no overt sign of infection    OTHER: above-mentioned plan was discussed with patient and available family members at bedside in detail. All the questions were answered and concerns were addressed.    DISPOSITION: Poor prognosis for meaningful recovery.  Rehab once workup is complete as decided is within GOC. Awaiting authorization from insurance company.    CORE MEASURES:        Admission NIHSS: 22     TPA: YES       LDL/HDL: 104/54     Depression Screen: NA due to global aphasia     Statin Therapy: yes     Dysphagia Screen: FAIL     Smoking  NO      Afib NO     Stroke Education  YES to family 86 year old woman with multiple vascular risk factors, including age and HTN was transferred from [facility] to [facility] for acute onset of right-sided weakness. She was noted to have developed acute onset of right-sided weakness and language disturbance. She was initially evaluated at [facility] through tele-stroke and was treated with IV tPA. MRI brain showed left CYNDEE and MCA distribution infarct with mild hemorrhagic transformation (HI 1). Impression: Cerebral embolism with cerebral infarction. Left ICA (MCA+CYNDEE) distribution stroke - likely etiology embolic stroke of unknown source.    NEURO: Neurologically without acute change- remains with global aphasia and right hemiplegia. Bedbound with functional quadriplegia. Continue monitoring for neurologic deterioration, permissive HTN with slow titration to normotensive, Admission -  will d/c Lipitor, ischemic infarct not related to atherosclerosis, Physical therapy recommend AR. Palliative team contacted to discuss goals of care (MOLST Form, DNR, hospice)-  family meeting done on 11/8 2pm, family decided code status DNR/DNI and are hopeful for rehab.     ANTITHROMBOTIC THERAPY: ASA for second stroke prevention.    PULMONARY: Protecting airway, saturating well     CARDIOVASCULAR: TTE shows EF 74% no evidence of thrombus or PFO, plan for ILR to screen for occult arrythmia-EP following. Cardiac monitoring with no recorded events.                           SBP goal: gradual normotension    GASTROINTESTINAL: dysphagia screen failed. Pt made NPO, s/p MBS: with recommendations for Dysphagia 1 diet, tolerating well     Diet: Dysphagia 1 honey    RENAL: BUN/Cr previously without acute change, urinary retention: 600ml retention, straight cath PRN     Na Goal: Greater than 135     Matias: N    HEMATOLOGY: no signs or symptoms of bleeding     DVT ppx:  LMWH     ID: afebrile, no overt sign of infection    OTHER: above-mentioned plan was discussed with patient and family at bedside. All the questions were answered and concerns were addressed.    DISPOSITION: Poor prognosis for meaningful recovery.  Rehab once workup is complete as decided is within GOC. Awaiting authorization from insurance company.    CORE MEASURES:        Admission NIHSS: 22     TPA: YES       LDL/HDL: 104/54     Depression Screen: NA due to global aphasia     Statin Therapy: yes     Dysphagia Screen: FAIL     Smoking  NO      Afib NO     Stroke Education  YES to family

## 2018-11-13 NOTE — PROGRESS NOTE ADULT - PROBLEM SELECTOR PLAN 3
Daughter feeding patient pureed food.   Tolerating well, no signs of aspiration.  Educated family about aspiration risks
On losartan
Patient being fed by daughter, appears to tolerate current diet.  Remains high risk for aspiration

## 2018-11-13 NOTE — PROGRESS NOTE ADULT - SUBJECTIVE AND OBJECTIVE BOX
Subjective: Patient seen and examined. No new events except as noted.   resting comfortably   REVIEW OF SYSTEMS:    CONSTITUTIONAL: + weakness, fevers or chills  EYES/ENT: No visual changes;  No vertigo or throat pain   NECK: No pain or stiffness  RESPIRATORY: No cough, wheezing, hemoptysis; No shortness of breath  CARDIOVASCULAR: No chest pain or palpitations  GASTROINTESTINAL: No abdominal or epigastric pain. No nausea, vomiting, or hematemesis; No diarrhea or constipation. No melena or hematochezia.  GENITOURINARY: No dysuria, frequency or hematuria  NEUROLOGICAL: No numbness or weakness  SKIN: No itching, burning, rashes, or lesions   All other review of systems is negative unless indicated above.    MEDICATIONS:  MEDICATIONS  (STANDING):  aspirin  chewable 81 milliGRAM(s) Oral daily  enoxaparin Injectable 40 milliGRAM(s) SubCutaneous daily  FLUoxetine 20 milliGRAM(s) Oral daily  influenza   Vaccine 0.5 milliLiter(s) IntraMuscular once  losartan 100 milliGRAM(s) Oral daily      PHYSICAL EXAM:  T(C): 36.7 (11-13-18 @ 08:32), Max: 37 (11-13-18 @ 04:51)  HR: 60 (11-13-18 @ 08:32) (59 - 78)  BP: 127/70 (11-13-18 @ 08:32) (113/61 - 127/70)  RR: 18 (11-13-18 @ 08:32) (18 - 18)  SpO2: 98% (11-13-18 @ 08:32) (97% - 99%)  Wt(kg): --  I&O's Summary    12 Nov 2018 07:01  -  13 Nov 2018 07:00  --------------------------------------------------------  IN: 720 mL / OUT: 0 mL / NET: 720 mL          Appearance: NAD aphasic 	  HEENT:   Normal oral mucosa, PERRL, EOMI	  Lymphatic: No lymphadenopathy , no edema  Cardiovascular: regular S1 S2, No JVD, No murmurs , Peripheral pulses palpable 2+ bilaterally  Respiratory: Lungs clear to auscultation, normal effort 	  Gastrointestinal:  Soft, Non-tender, + BS	  Skin: No rashes, No ecchymoses, No cyanosis, warm to touch  Musculoskeletal: decreased  range of motion and strength  Psychiatry:  Mood & affect appropriate  Ext: No edema  NEUROLOGICAL EXAM:  Mental status: Awake, alert, mute, globally aphasic, able to follow some simple commands, unable to mimic, left hemineglect   Cranial Nerves: right facial droop, right gaze palsy, RHH  Motor exam: right hypotonic hemiplegia and was able to move LUE and LLE against gravity without noticeable weakness   Sensation: grimaces to noxious stimuli  Coordination/ Gait: unable to participate with exam due to aphasic           LABS:    CARDIAC MARKERS:                  proBNP:   Lipid Profile:   HgA1c:   TSH:             TELEMETRY: 	    ECG:  	  RADIOLOGY:   DIAGNOSTIC TESTING:  [ ] Echocardiogram:  [ ]  Catheterization:  [ ] Stress Test:    OTHER:

## 2018-11-13 NOTE — PROGRESS NOTE ADULT - SUBJECTIVE AND OBJECTIVE BOX
EP Nurse Practitioner Progress note: Resting comfortably in chair, without complaints.  Patient's daughter and grandson at bed side translating.         HPI  PHYSICAL EXAM:    VS:   Tele: Not on telemetry     ASSESSMENT/PLAN: 	  ASSESSMENT/PLAN: 	  85y/o female h/o HTN, LBBB p/w left CYNDEE & MCA CVA s/p tPA w/ residual aphasia.  --Echo without PFO   for ILR implant. Spoke with pt's daughter and granson in regards to ILR including risks, benefit     NITHIN Castelan NP   [id number] EP Nurse Practitioner Progress note: Resting comfortably in chair, without complaints.  Patient's daughter and grandson at bed side translating.       VSS  Tele: Not on telemetry     ASSESSMENT/PLAN: 	  86 year old female with h/o HTN, LBBB p/w left CYNDEE & MCA CVA s/p tPA w/ residual aphasia.  --Echo without PFO  --Patient and family members in agreement for ILR implant today. Spoke with pt's daughter and grandson in regards to ILR including risks, benefits.     NITHIN Castelan NP   [id number]

## 2018-11-14 RX ORDER — TELMISARTAN 20 MG/1
1 TABLET ORAL
Qty: 0 | Refills: 0 | COMMUNITY

## 2018-11-14 RX ORDER — NEBIVOLOL HYDROCHLORIDE 5 MG/1
1 TABLET ORAL
Qty: 0 | Refills: 0 | COMMUNITY

## 2018-11-14 RX ORDER — ASPIRIN/CALCIUM CARB/MAGNESIUM 324 MG
1 TABLET ORAL
Qty: 30 | Refills: 0 | OUTPATIENT
Start: 2018-11-14 | End: 2018-12-13

## 2018-11-14 RX ORDER — LOSARTAN POTASSIUM 100 MG/1
1 TABLET, FILM COATED ORAL
Qty: 30 | Refills: 0 | OUTPATIENT
Start: 2018-11-14 | End: 2018-12-13

## 2018-11-14 RX ORDER — FLUOXETINE HCL 10 MG
1 CAPSULE ORAL
Qty: 30 | Refills: 0 | OUTPATIENT
Start: 2018-11-14 | End: 2018-12-13

## 2018-11-14 RX ADMIN — Medication 81 MILLIGRAM(S): at 11:59

## 2018-11-14 RX ADMIN — Medication 20 MILLIGRAM(S): at 11:59

## 2018-11-14 RX ADMIN — ENOXAPARIN SODIUM 40 MILLIGRAM(S): 100 INJECTION SUBCUTANEOUS at 11:59

## 2018-11-14 NOTE — PROGRESS NOTE ADULT - SUBJECTIVE AND OBJECTIVE BOX
THE PATIENT WAS SEEN AND EXAMINED BY ME WITH THE HOUSESTAFF AND STROKE TEAM DURING MORNING ROUNDS.   HPI:  85 yo right-handed Cypriot speaking woman who presented to [facility] as a transfer from [facility] for new and acute right-sided weakness and language disturbance (LWK 5-530pm, 11/2/18). ROS also revealed non-verbal, unresponsiveness. Otherwise limited due to patient's current cognitive state. Pertinent hx includes HTN and "heart condition" (unspecified w/ family at bedside). Patient's initial NIHSS score worsened to 28 upon arrival to [facility], was given tPA (1859pm, 11/2/18) and subsequently transferred for possible endovascular intervention. Patient's on arrival to [facility] ER NIHSS 22, pre-MRS 0, no M1 occlusion.       SUBJECTIVE: No events overnight.  No new neurologic complaints.      aspirin  chewable 81 milliGRAM(s) Oral daily  ceFAZolin   IVPB 1000 milliGRAM(s) IV Intermittent once  enoxaparin Injectable 40 milliGRAM(s) SubCutaneous daily  FLUoxetine 20 milliGRAM(s) Oral daily  influenza   Vaccine 0.5 milliLiter(s) IntraMuscular once  losartan 100 milliGRAM(s) Oral daily      PHYSICAL EXAM:   Vital Signs Last 24 Hrs  T(C): 36.9 (14 Nov 2018 07:22), Max: 37 (13 Nov 2018 23:17)  T(F): 98.5 (14 Nov 2018 07:22), Max: 98.6 (13 Nov 2018 23:17)  HR: 64 (14 Nov 2018 07:22) (56 - 66)  BP: 128/59 (14 Nov 2018 07:22) (98/58 - 128/59)  BP(mean): --  RR: 18 (14 Nov 2018 07:22) (17 - 18)  SpO2: 94% (14 Nov 2018 07:22) (94% - 98%)    General: No acute distress  HEENT:  right gaze palsy, RHH  Abdomen: Soft, nontender, nondistended   Extremities: No edema    NEUROLOGICAL EXAM:  Mental status: Awake, alert, mute, globally aphasic, able to follow some simple commands like "open your eyes, stick out your tongue", able to mimic, left hemineglect   Cranial Nerves: right facial droop, right gaze palsy, RHH  Motor exam: right hypotonic hemiplegia and was able to move LUE and LLE against gravity without noticeable weakness   Sensation: grimaces to noxious stimuli  Coordination/ Gait: unable to participate with exam due to aphasic     LABS:     Hemoglobin A1C, Whole Blood: 5.4 % (11-03 @ 05:06)      IMAGING: Reviewed by me.     Brain MRI (11/03/18) left CYNDEE and left MCA territory infarcts with small amount of cortical hemorrhage in the left MCA infarct    Head CT, CT perfusion, CTA head/Neck  (11/03/18) Atrophy. 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     CTA HEAD (11/03/18): demonstrates decreased number of left middle cerebral artery branches in the sylvian fissure compared with the right consistent with a distal branch occlusion. There is a left distal A2 occlusion.

## 2018-11-14 NOTE — PROGRESS NOTE ADULT - ASSESSMENT
86 year old woman with multiple vascular risk factors, including age and HTN was transferred from [facility] to [facility] for acute onset of right-sided weakness. She was noted to have developed acute onset of right-sided weakness and language disturbance. She was initially evaluated at [facility] through tele-stroke and was treated with IV tPA. MRI brain showed left CYNDEE and MCA distribution infarct with mild hemorrhagic transformation (HI 1). Impression: Cerebral embolism with cerebral infarction. Left ICA (MCA+CYNDEE) distribution stroke - likely etiology embolic stroke of unknown source.    NEURO: Neurologically without acute change- remains with global aphasia and right hemiplegia. Bedbound with functional quadriplegia. Continue monitoring for neurologic deterioration, permissive HTN with slow titration to normotensive, Admission -  will d/c Lipitor, ischemic infarct not related to atherosclerosis, Physical therapy recommend AR. Palliative team contacted to discuss goals of care (MOLST Form, DNR, hospice)-  family meeting done on 11/8 2pm, family decided code status DNR/DNI and are hopeful for rehab.     ANTITHROMBOTIC THERAPY: ASA for second stroke prevention.    PULMONARY: Protecting airway, saturating well     CARDIOVASCULAR: TTE shows EF 74% no evidence of thrombus or PFO, no need for ILR at this time EP made aware, plan for 30 day Holter monitoring as outpt. Cardiac monitoring with no recorded events.                           SBP goal: gradual normotension    GASTROINTESTINAL: s/p MBS: with recommendations for Dysphagia 1 diet, tolerating well     Diet: Dysphagia 1 honey    RENAL: BUN/Cr previously without acute change, urinary retention: 600ml retention, straight cath PRN     Na Goal: Greater than 135     Matias: N    HEMATOLOGY: no signs or symptoms of bleeding     DVT ppx:  LMWH     ID: afebrile, no overt sign of infection    OTHER: above-mentioned plan was discussed with patient and family at bedside. All the questions were answered and concerns were addressed.    DISPOSITION: Poor prognosis for meaningful recovery.  Rehab once workup is complete as decided is within GOC. Awaiting authorization from insurance company.    CORE MEASURES:        Admission NIHSS: 22     TPA: YES       LDL/HDL: 104/54     Depression Screen: NA due to global aphasia     Statin Therapy: yes     Dysphagia Screen: FAIL     Smoking  NO      Afib NO     Stroke Education  YES to family

## 2018-11-15 ENCOUNTER — INPATIENT (INPATIENT)
Facility: HOSPITAL | Age: 83
LOS: 26 days | Discharge: HOME HEALTH | DRG: 949 | End: 2018-12-12
Attending: PHYSICAL MEDICINE & REHABILITATION | Admitting: PHYSICAL MEDICINE & REHABILITATION
Payer: SELF-PAY

## 2018-11-15 VITALS
SYSTOLIC BLOOD PRESSURE: 114 MMHG | TEMPERATURE: 99 F | RESPIRATION RATE: 18 BRPM | OXYGEN SATURATION: 97 % | HEART RATE: 67 BPM | DIASTOLIC BLOOD PRESSURE: 53 MMHG

## 2018-11-15 VITALS
WEIGHT: 128.31 LBS | HEART RATE: 67 BPM | OXYGEN SATURATION: 96 % | DIASTOLIC BLOOD PRESSURE: 66 MMHG | HEIGHT: 57 IN | RESPIRATION RATE: 14 BRPM | TEMPERATURE: 98 F | SYSTOLIC BLOOD PRESSURE: 132 MMHG

## 2018-11-15 DIAGNOSIS — I63.9 CEREBRAL INFARCTION, UNSPECIFIED: ICD-10-CM

## 2018-11-15 DIAGNOSIS — Z90.49 ACQUIRED ABSENCE OF OTHER SPECIFIED PARTS OF DIGESTIVE TRACT: Chronic | ICD-10-CM

## 2018-11-15 PROBLEM — I10 ESSENTIAL (PRIMARY) HYPERTENSION: Chronic | Status: ACTIVE | Noted: 2018-11-02

## 2018-11-15 PROCEDURE — 83036 HEMOGLOBIN GLYCOSYLATED A1C: CPT

## 2018-11-15 PROCEDURE — 86850 RBC ANTIBODY SCREEN: CPT

## 2018-11-15 PROCEDURE — 97116 GAIT TRAINING THERAPY: CPT

## 2018-11-15 PROCEDURE — 97162 PT EVAL MOD COMPLEX 30 MIN: CPT

## 2018-11-15 PROCEDURE — 93306 TTE W/DOPPLER COMPLETE: CPT

## 2018-11-15 PROCEDURE — 86900 BLOOD TYPING SEROLOGIC ABO: CPT

## 2018-11-15 PROCEDURE — 70450 CT HEAD/BRAIN W/O DYE: CPT

## 2018-11-15 PROCEDURE — 0042T: CPT

## 2018-11-15 PROCEDURE — 93005 ELECTROCARDIOGRAM TRACING: CPT

## 2018-11-15 PROCEDURE — 85027 COMPLETE CBC AUTOMATED: CPT

## 2018-11-15 PROCEDURE — 92611 MOTION FLUOROSCOPY/SWALLOW: CPT

## 2018-11-15 PROCEDURE — 86901 BLOOD TYPING SEROLOGIC RH(D): CPT

## 2018-11-15 PROCEDURE — 70551 MRI BRAIN STEM W/O DYE: CPT

## 2018-11-15 PROCEDURE — 70498 CT ANGIOGRAPHY NECK: CPT

## 2018-11-15 PROCEDURE — 80048 BASIC METABOLIC PNL TOTAL CA: CPT

## 2018-11-15 PROCEDURE — 74230 X-RAY XM SWLNG FUNCJ C+: CPT

## 2018-11-15 PROCEDURE — 85610 PROTHROMBIN TIME: CPT

## 2018-11-15 PROCEDURE — 80061 LIPID PANEL: CPT

## 2018-11-15 PROCEDURE — 97110 THERAPEUTIC EXERCISES: CPT

## 2018-11-15 PROCEDURE — 85730 THROMBOPLASTIN TIME PARTIAL: CPT

## 2018-11-15 PROCEDURE — 99291 CRITICAL CARE FIRST HOUR: CPT | Mod: 25

## 2018-11-15 PROCEDURE — 80053 COMPREHEN METABOLIC PANEL: CPT

## 2018-11-15 PROCEDURE — 82962 GLUCOSE BLOOD TEST: CPT

## 2018-11-15 PROCEDURE — 97760 ORTHOTIC MGMT&TRAING 1ST ENC: CPT

## 2018-11-15 PROCEDURE — 97166 OT EVAL MOD COMPLEX 45 MIN: CPT

## 2018-11-15 PROCEDURE — 97535 SELF CARE MNGMENT TRAINING: CPT

## 2018-11-15 PROCEDURE — 97530 THERAPEUTIC ACTIVITIES: CPT

## 2018-11-15 PROCEDURE — 97112 NEUROMUSCULAR REEDUCATION: CPT

## 2018-11-15 PROCEDURE — 92610 EVALUATE SWALLOWING FUNCTION: CPT

## 2018-11-15 PROCEDURE — 99232 SBSQ HOSP IP/OBS MODERATE 35: CPT

## 2018-11-15 PROCEDURE — 70496 CT ANGIOGRAPHY HEAD: CPT

## 2018-11-15 RX ORDER — ENOXAPARIN SODIUM 100 MG/ML
40 INJECTION SUBCUTANEOUS DAILY
Qty: 0 | Refills: 0 | Status: DISCONTINUED | OUTPATIENT
Start: 2018-11-15 | End: 2018-12-12

## 2018-11-15 RX ORDER — SENNA PLUS 8.6 MG/1
2 TABLET ORAL AT BEDTIME
Qty: 0 | Refills: 0 | Status: DISCONTINUED | OUTPATIENT
Start: 2018-11-15 | End: 2018-11-16

## 2018-11-15 RX ORDER — FLUOXETINE HCL 10 MG
20 CAPSULE ORAL DAILY
Qty: 0 | Refills: 0 | Status: DISCONTINUED | OUTPATIENT
Start: 2018-11-15 | End: 2018-12-12

## 2018-11-15 RX ORDER — ACETAMINOPHEN 500 MG
650 TABLET ORAL EVERY 6 HOURS
Qty: 0 | Refills: 0 | Status: DISCONTINUED | OUTPATIENT
Start: 2018-11-15 | End: 2018-12-01

## 2018-11-15 RX ORDER — DOCUSATE SODIUM 100 MG
100 CAPSULE ORAL
Qty: 0 | Refills: 0 | Status: DISCONTINUED | OUTPATIENT
Start: 2018-11-15 | End: 2018-11-16

## 2018-11-15 RX ORDER — LOSARTAN POTASSIUM 100 MG/1
100 TABLET, FILM COATED ORAL DAILY
Qty: 0 | Refills: 0 | Status: DISCONTINUED | OUTPATIENT
Start: 2018-11-15 | End: 2018-12-12

## 2018-11-15 RX ORDER — ASPIRIN/CALCIUM CARB/MAGNESIUM 324 MG
81 TABLET ORAL DAILY
Qty: 0 | Refills: 0 | Status: DISCONTINUED | OUTPATIENT
Start: 2018-11-15 | End: 2018-12-12

## 2018-11-15 RX ADMIN — ENOXAPARIN SODIUM 40 MILLIGRAM(S): 100 INJECTION SUBCUTANEOUS at 12:52

## 2018-11-15 RX ADMIN — LOSARTAN POTASSIUM 100 MILLIGRAM(S): 100 TABLET, FILM COATED ORAL at 06:00

## 2018-11-15 RX ADMIN — Medication 81 MILLIGRAM(S): at 12:52

## 2018-11-15 RX ADMIN — Medication 20 MILLIGRAM(S): at 12:52

## 2018-11-15 NOTE — PROGRESS NOTE ADULT - ASSESSMENT
86 year old woman with multiple vascular risk factors, including age and HTN was transferred from [facility] to [facility] for acute onset of right-sided weakness. She was noted to have developed acute onset of right-sided weakness and language disturbance. She was initially evaluated at [facility] through tele-stroke and was treated with IV tPA. MRI brain showed left CYNDEE and MCA distribution infarct with mild hemorrhagic transformation (HI 1). Impression: Cerebral embolism with cerebral infarction. Left ICA (MCA+CYNDEE) distribution stroke - likely etiology embolic stroke of unknown source.    NEURO: Neurologically without acute change- remains with global aphasia and right hemiplegia. Bedbound with functional quadriplegia. Continue monitoring for neurologic deterioration, permissive HTN with slow titration to normotensive, Admission -  will d/c Lipitor, ischemic infarct not related to atherosclerosis, Physical therapy recommend AR. Palliative team contacted to discuss goals of care (MOLST Form, DNR, hospice)-  family meeting done on 11/8 2pm, family decided code status DNR/DNI and are hopeful for rehab.     ANTITHROMBOTIC THERAPY: ASA for second stroke prevention.    PULMONARY: Protecting airway, saturating well     CARDIOVASCULAR: TTE shows EF 74% no evidence of thrombus or PFO, no need for ILR at this time EP made aware, plan for 30 day Holter monitoring as outpt. Cardiac monitoring with no recorded events.                           SBP goal: gradual normotension    GASTROINTESTINAL: s/p MBS: with recommendations for Dysphagia 1 diet, tolerating well     Diet: Dysphagia 1 honey    RENAL: BUN/Cr previously without acute change, urinary retention: 600ml retention, straight cath PRN     Na Goal: Greater than 135     Matias: N    HEMATOLOGY: no signs or symptoms of bleeding     DVT ppx:  LMWH     ID: afebrile, no overt sign of infection    OTHER: above-mentioned plan was discussed with patient and family at bedside. All the questions were answered and concerns were addressed.    DISPOSITION:  Rehab pending bed availability.    CORE MEASURES:        Admission NIHSS: 22     TPA: YES       LDL/HDL: 104/54     Depression Screen: NA due to global aphasia     Statin Therapy: yes     Dysphagia Screen: FAIL     Smoking  NO      Afib NO     Stroke Education  YES to family

## 2018-11-15 NOTE — PATIENT PROFILE ADULT - INDICATE TYPE
Do Not Resuscitate (DNR) Medical Orders for Life-Sustaining Treatment (MOLST)/Do Not Resuscitate (DNR)

## 2018-11-15 NOTE — H&P ADULT - HISTORY OF PRESENT ILLNESS
85 yo right-handed Kinyarwanda speaking woman who presents to [facility] as a transfer from [facility] for new and acute right-sided weakness and language disturbance (LWK 5-530pm, 11/2/18). ROS also revealed non-verbal, unresponsiveness. Otherwise limited due to patient's current cognitive state. Pertinent hx includes HTN and "heart condition" (unspecified w/ family at bedside). Patient's initial NIHSS score worsened to 28 upon arrival to [facility], was given tPA (1859pm, 11/2/18) and subsequently transferred for possible endovascular intervention. Patient's current NIHSS 22, pre-MRS 0, no M1 occlusion. (02 Nov 2018 22:01)  Work up noted Left CYNDEE and MCA CVA with minimal hemorrhagic transformation. Patient had MBS on 11/5 and was put on dysphagia 1 pureed with honey thickened liquids. Patient had meeting with Palliative Care at [facility]- Discussed and completed MOLST form.  Pt is DNR/DNI.  See MOLST for details surrounding future goals.  Briefly,  pt is DNR/DNI,  TRIAL OF NG TUBE IF BECOMES DYSPHASIC,  IV FLUIDS AS NEEDED,  ANTIBIOTICS FOR INFECTION, RETURN TO HOSPITAL, FULL MEDICAL MANAGEMENT SHORT OF INTUBATION AND RESUSCITATION. Family wishes to pursue rehab if she is a candidate with eventual return home.   Four of the 8 children live in [place] and are in constant contact with the [place] siblings. 86 year old woman with multiple vascular risk factors, including age and HTN was transferred from [facility] to [facility] for acute onset of right-sided weakness. She was noted to have developed acute onset of right-sided weakness and language disturbance. She was initially evaluated at [facility] through tele-stroke and was treated with IV tPA (Admission NIHSS 22). MRI brain showed left CYNDEE and MCA distribution infarct with mild hemorrhagic transformation (HI 1). 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    During hospital stay was neurologically without acute change: remains with global aphasia, right hemiplegia and bedbound due to functional quadriplegia. Permissive hypertension was slowly titrated to normotension. Course was complicated by urinary retention which was managed with PRN straight catheterization. MBS was performed and patient was placed on a Dysphagia 1 honey diet which has been well-tolerated. Lipitor discontinued due to admission LDL of 104. Family meeting to discuss goals of care done on 11/8 2pm, family decided code status DNR/DNI. Discussed and completed MOLST form. Briefly,  pt is DNR/DNI,  TRIAL OF NG TUBE IF BECOMES DYSPHASIC, IV FLUIDS AS NEEDED,  ANTIBIOTICS FOR INFECTION, RETURN TO HOSPITAL, FULL MEDICAL MANAGEMENT SHORT OF INTUBATION AND RESUSCITATION. Cardiac monitoring revealed no events. TTE shows EF 74% no evidence of thrombus or PFO.  Deemed medically stable for acute rehab. 86 year old woman RH dominant with multiple vascular risk factors, including age and HTN was transferred from [facility] to [facility] for acute onset of right-sided weakness. She was noted to have developed acute onset of right-sided weakness and language disturbance. She was initially evaluated at [facility] through tele-stroke and was treated with IV tPA (Admission NIHSS 22). MRI brain showed left CYNDEE and MCA distribution infarct with mild hemorrhagic transformation (HI 1). 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    During hospital stay was neurologically without acute change: remains with global aphasia, right hemiplegia . Permissive hypertension was slowly titrated to normotension. Course was complicated by urinary retention which was managed with PRN straight catheterization. MBS was performed and patient was placed on a Dysphagia 1 honey diet which has been well-tolerated. Lipitor discontinued due to admission LDL of 104. Family meeting to discuss goals of care done on 11/8 2pm, family decided code status DNR/DNI. Discussed and completed MOLST form. Briefly,  pt is DNR/DNI,  TRIAL OF NG TUBE IF BECOMES DYSPHASIC, IV FLUIDS AS NEEDED,  ANTIBIOTICS FOR INFECTION, RETURN TO HOSPITAL, FULL MEDICAL MANAGEMENT SHORT OF INTUBATION AND RESUSCITATION. Cardiac monitoring revealed no events. TTE shows EF 74% no evidence of thrombus or PFO.  Deemed medically stable for acute rehab.

## 2018-11-15 NOTE — PROGRESS NOTE ADULT - SUBJECTIVE AND OBJECTIVE BOX
THE PATIENT WAS SEEN AND EXAMINED BY ME WITH THE HOUSESTAFF AND STROKE TEAM DURING MORNING ROUNDS.   HPI:  87 yo right-handed Prydeinig speaking woman who presented to [facility] as a transfer from [facility] for new and acute right-sided weakness and language disturbance (LWK 5-530pm, 11/2/18). ROS also revealed non-verbal, unresponsiveness. Otherwise limited due to patient's current cognitive state. Pertinent hx includes HTN and "heart condition" (unspecified w/ family at bedside). Patient's initial NIHSS score worsened to 28 upon arrival to [facility], was given tPA (1859pm, 11/2/18) and subsequently transferred for possible endovascular intervention. Patient's on arrival to [facility] ER NIHSS 22, pre-MRS 0, no M1 occlusion.     SUBJECTIVE: No events overnight.  No new neurologic complaints.      aspirin  chewable 81 milliGRAM(s) Oral daily  ceFAZolin   IVPB 1000 milliGRAM(s) IV Intermittent once  enoxaparin Injectable 40 milliGRAM(s) SubCutaneous daily  FLUoxetine 20 milliGRAM(s) Oral daily  influenza   Vaccine 0.5 milliLiter(s) IntraMuscular once  losartan 100 milliGRAM(s) Oral daily      PHYSICAL EXAM:   Vital Signs Last 24 Hrs  T(C): 36.6 (15 Nov 2018 08:30), Max: 37.1 (14 Nov 2018 23:29)  T(F): 97.8 (15 Nov 2018 08:30), Max: 98.8 (14 Nov 2018 23:29)  HR: 68 (15 Nov 2018 08:30) (58 - 68)  BP: 131/63 (15 Nov 2018 08:30) (108/81 - 133/62)  RR: 18 (15 Nov 2018 08:30) (18 - 18)  SpO2: 97% (15 Nov 2018 08:30) (96% - 98%)    General: No acute distress  HEENT:  right gaze palsy, RHH  Abdomen: Soft, nontender, nondistended   Extremities: No edema    NEUROLOGICAL EXAM:  Mental status: Awake, alert, mute, globally aphasic, able to follow some simple commands like "open your eyes, stick out your tongue", able to mimic, left hemineglect   Cranial Nerves: right facial droop, right gaze palsy, RHH  Motor exam: right hypotonic hemiplegia and was able to move LUE and LLE against gravity without noticeable weakness   Sensation: grimaces to noxious stimuli  Coordination/ Gait: unable to participate with exam due to aphasic     LABS:         Hemoglobin A1C, Whole Blood: 5.4 % (11-03 @ 05:06)      IMAGING: Reviewed by me.   Brain MRI (11/03/18) left CYNDEE and left MCA territory infarcts with small amount of cortical hemorrhage in the left MCA infarct    Head CT, CT perfusion, CTA head/Neck  (11/03/18) Atrophy. 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     CTA HEAD (11/03/18): demonstrates decreased number of left middle cerebral artery branches in the sylvian fissure compared with the right consistent with a distal branch occlusion. There is a left distal A2 occlusion.

## 2018-11-15 NOTE — H&P ADULT - NSHPLABSRESULTS_GEN_ALL_CORE
Brain MRI (11/03/18) left CYNDEE and left MCA territory infarcts with small amount of cortical hemorrhage in the left MCA infarct    Head CT, CT perfusion, CTA head/Neck  (11/03/18) Atrophy. 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     CTA HEAD (11/03/18): demonstrates decreased number of left middle cerebral artery branches in the sylvian fissure compared with the right consistent with a distal branch occlusion. There is a left distal A2 occlusion.

## 2018-11-15 NOTE — H&P ADULT - ASSESSMENT
ASSESSMENT/PLAN  86 year-old woman with a PMH of HTN found to have left ICA (MCA + CYNDEE) stroke s/p tPA  with Gait Instability, ADL impairments and Functional impairments and cognitive impairments    COMORBIDITES/ACTIVE MEDICAL ISSUES   Left ICA stroke unknown etiology- with global aphasia and functional quadriplegia  -Aspirin  -Statin discontinued as stroke not to be of atherosclerotic origin  -Fluoxetine    Cardiovascular- no PFO on ECHO, EF 74%, HTN  -Losartan    GI- failed MBS  -Pureed with honey thickened liquids    Gait Instability, ADL impairments and Functional impairments: start Comprehensive Rehab Program of PT/OT     Pain Mgmt - Tylenol PRN,   GI/Bowel Mgmt -  Continent c/w Colace, Senna PRN  /Bladder Mgmt - Continent, PVR    FEN   - Diet - Pureed with honey thickened liquid   - Dysphagia  SLP - evaluation and treatment    Precautions / PROPHYLAXIS:   - Falls,   - ortho: Weight bearing status: WBAT   - Lungs: Aspiration, Incentive Spirometer   - Pressure injury/Skin: Turn Q2hrs while in bed, OOB to Chair, PT/OT    - DVT: Lovenox, SCDs, TEDs     MEDICAL PROGNOSIS: GOOD            REHAB POTENTIAL: GOOD             ESTIMATED DISPOSITION: HOME WITH HOME CARE            ELOS: 10-14 Days   EXPECTED THERAPY:     P.T. 1hr/day       O.T. 1hr/day      S.L.P. 1hr/day     P&O Unnecessary     EXP FREQUENCY: 5 days per 7 day period     PRESCREEN COMPARISION:   I have reviewed the prescreen information and I have found no relevant changes between the preadmission screening and my post admission evaluation     RATIONALE FOR INPATIENT ADMISSION - Patient demonstrates the following: (check all that apply)  [X] Medically appropriate for rehabilitation admission  [X] Has attainable rehab goals with an appropriate initial discharge plan  [X] Has rehabilitation potential (expected to make a significant improvement within a reasonable period of time)   [X] Requires close medical management by a rehab physician, rehab nursing care, Hospitalist and comprehensive interdisciplinary team (including PT, OT, & or SLP, Prosthetics and Orthotics) ASSESSMENT/PLAN  86 year-old woman with a PMH of HTN found to have left ICA (MCA + CYNDEE) stroke s/p tPA  with Gait Instability, ADL impairments and Functional impairments and cognitive impairments    COMORBIDITES/ACTIVE MEDICAL ISSUES   Left ICA stroke unknown etiology- with global aphasia and functional quadriplegia  -Aspirin  -Statin discontinued as stroke not to be of atherosclerotic origin  -Fluoxetine for motor recovery after stroke     Cardiovascular- no PFO on ECHO, EF 74%, HTN  -Losartan    GI- failed MBS  -Pureed with honey thickened liquids    Gait Instability, ADL impairments and Functional impairments: start Comprehensive Rehab Program of PT/OT     Pain Mgmt - Tylenol PRN,   GI/Bowel Mgmt -  Continent c/w Colace, Senna PRN  /Bladder Mgmt - Continent, PVR    FEN   - Diet - Pureed with honey thickened liquid   - Dysphagia  SLP - evaluation and treatment    Precautions / PROPHYLAXIS:   - Falls,   - ortho: Weight bearing status: WBAT   - Lungs: Aspiration, Incentive Spirometer   - Pressure injury/Skin: Turn Q2hrs while in bed, OOB to Chair, PT/OT    - DVT: Lovenox, SCDs, TEDs     MEDICAL PROGNOSIS: GOOD            REHAB POTENTIAL: GOOD             ESTIMATED DISPOSITION: HOME WITH HOME CARE            ELOS: 10-14 Days   EXPECTED THERAPY:     P.T. 1hr/day       O.T. 1hr/day      S.L.P. 1hr/day     P&O Unnecessary     EXP FREQUENCY: 5 days per 7 day period     PRESCREEN COMPARISION:   I have reviewed the prescreen information and I have found no relevant changes between the preadmission screening and my post admission evaluation     RATIONALE FOR INPATIENT ADMISSION - Patient demonstrates the following: (check all that apply)  [X] Medically appropriate for rehabilitation admission  [X] Has attainable rehab goals with an appropriate initial discharge plan  [X] Has rehabilitation potential (expected to make a significant improvement within a reasonable period of time)   [X] Requires close medical management by a rehab physician, rehab nursing care, Hospitalist and comprehensive interdisciplinary team (including PT, OT, & or SLP, Prosthetics and Orthotics) ASSESSMENT/PLAN  86 year-old woman with a PMH of HTN found to have left ICA (MCA + CYNDEE) stroke s/p tPA  with right dense HP, right tran inattention, global aphasia    #Left ICA stroke unknown etiology- with global aphasia and functional quadriplegia  -Aspirin  -Statin: discontinued as stroke not to be of atherosclerotic origin  -Fluoxetine for motor recovery after stroke  -OT, PT, SLP     #Cardiovascular- no PFO on ECHO, EF 74%, HTN  -Losartan  -hospitalist consult    # F/EN:  -Pureed with honey thickened liquids  -monitor intake, hydration status    #Pain Mgmt - Tylenol PRN,     #GI/Bowel Mgmt -  Continent c/w Colace, Senna PRN. AXR for distension, hypoactive BS r/o ileus  /Bladder Mgmt - Continent, PVR      Precautions / PROPHYLAXIS:   - Falls,   - ortho: Weight bearing status: WBAT   - Lungs: Aspiration, Incentive Spirometer   - Pressure injury/Skin: Turn Q2hrs while in bed, OOB to Chair, PT/OT    - DVT: Lovenox, SCDs, TEDs     MEDICAL PROGNOSIS: GOOD            REHAB POTENTIAL: fair           ESTIMATED DISPOSITION: HOME WITH HOME CARE 24 hour caregiver support wheelchair level           ELOS: 14-21  Days   EXPECTED THERAPY:     P.T. 1hr/day       O.T. 1hr/day      S.L.P. 1hr/day     P&O Unnecessary     EXP FREQUENCY: 5 days per 7 day period     PRESCREEN COMPARISION:   I have reviewed the prescreen information and I have found no relevant changes between the preadmission screening and my post admission evaluation     RATIONALE FOR INPATIENT ADMISSION - Patient demonstrates the following: (check all that apply)  [X] Medically appropriate for rehabilitation admission  [X] Has attainable rehab goals with an appropriate initial discharge plan  [X] Has rehabilitation potential (expected to make a significant improvement within a reasonable period of time)   [X] Requires close medical management by a rehab physician, rehab nursing care, Hospitalist and comprehensive interdisciplinary team (including PT, OT, & or SLP, Prosthetics and Orthotics)

## 2018-11-15 NOTE — PATIENT PROFILE ADULT - STATED REASON FOR ADMISSION
pt was having dinner with family and became unresponsive , was take by ambulance to [facility]

## 2018-11-15 NOTE — PROGRESS NOTE ADULT - PROVIDER SPECIALTY LIST ADULT
Cardiology
Electrophysiology
Neurology
Palliative Care
Rehab Medicine
Palliative Care

## 2018-11-15 NOTE — H&P ADULT - NSHPSOCIALHISTORY_GEN_ALL_CORE
As per son, pt lives with daughter and grandchildren in a pvt home. No steps to enter, and no stairs inside. No DME at home  8 Children:   Ivone Blackburn ,  point person [phone]    CUrrent Functional Status  Max assist transfers and gait  Non-verbal As per son, pt lives with daughter and grandchildren in a pvt home. No steps to enter, and no stairs inside. No DME at home  8 Children:   Ivone Blackburn ,  point person [phone]    Current Functional Status  Max assist transfers and gait  Non-verbal Chart review: As per son, pt lives with daughter and grandchildren in a pvt home. No steps to enter, and no stairs inside. No DME at home  8 Children:   Ivone Blackburn ,  point person [phone]    Current Functional Status  Max assist transfers and gait  Non-verbal Chart review: As per son, pt lives with daughter and grandchildren in a pvt home. No steps to enter, and no stairs inside. No DME at home  8 Children:   Ivone Alexey ,  point person [phone]    Current Functional Status  Max assist transfers and gait  Non-verbal  +vocalizes, not intelligiblte, poor output

## 2018-11-15 NOTE — H&P ADULT - NSHPPHYSICALEXAM_GEN_ALL_CORE
Vital Signs Last 24 Hrs  T(C): 37 (15 Nov 2018 16:03), Max: 37.1 (14 Nov 2018 23:29)  T(F): 98.6 (15 Nov 2018 16:03), Max: 98.8 (14 Nov 2018 23:29)  HR: 67 (15 Nov 2018 16:03) (60 - 68)  BP: 114/53 (15 Nov 2018 16:03) (113/64 - 131/63)  BP(mean): --  RR: 18 (15 Nov 2018 16:03) (17 - 18)  SpO2: 97% (15 Nov 2018 16:03) (96% - 98%)      ----------------------------------------------------------------------------------------  PHYSICAL EXAM  Constitutional - NAD, Comfortable  HEENT - NCAT, EOMI  Neck - Supple, No limited ROM  Chest - Breathing comfortably, No wheezing  Cardiovascular - S1S2   Abdomen - Soft   Extremities - No C/C/E, No calf tenderness   Neuro:  Awake, alert, mute, globally aphasic, able to follow some simple commands like  able to mimic, left hemineglect   Cranial Nerves: right facial droop, right gaze palsy, RHH  Motor exam: right hypotonic hemiplegia and was able to move LUE and LLE against gravity without noticeable weakness   Sensation: grimaces to noxious stimuli  Coordination/ Gait: unable to participate with exam due to aphasic Vital Signs Last 24 Hrs  T(C): 37 (15 Nov 2018 16:03), Max: 37.1 (14 Nov 2018 23:29)  T(F): 98.6 (15 Nov 2018 16:03), Max: 98.8 (14 Nov 2018 23:29)  HR: 67 (15 Nov 2018 16:03) (60 - 68)  BP: 114/53 (15 Nov 2018 16:03) (113/64 - 131/63)  BP(mean): --  RR: 18 (15 Nov 2018 16:03) (17 - 18)  SpO2: 97% (15 Nov 2018 16:03) (96% - 98%)      PHYSICAL EXAM  Constitutional - NAD, Comfortable  HEENT - NCAT, EOMI  Neck - Supple, No limited ROM  Chest - Breathing comfortably, No wheezing  Cardiovascular - S1S2   Abdomen - Soft   Extremities - No C/C/E, No calf tenderness   Neuro:  Awake, alert, mute, globally aphasic, able to follow some simple commands to lift left arms, squeeze fingers, smile, left hemineglect   Cranial Nerves: right facial droop, right gaze palsy  Motor exam: dense right hemiplegia with increased tone in RUE and RLE. LUE and LLE against gravity without noticeable weakness   Sensation: grimaces to noxious stimuli  Coordination/ Gait: unable to participate with exam due to aphasic Vital Signs Last 24 Hrs  T(C): 37 (15 Nov 2018 16:03), Max: 37.1 (14 Nov 2018 23:29)  T(F): 98.6 (15 Nov 2018 16:03), Max: 98.8 (14 Nov 2018 23:29)  HR: 67 (15 Nov 2018 16:03) (60 - 68)  BP: 114/53 (15 Nov 2018 16:03) (113/64 - 131/63)  BP(mean): --  RR: 18 (15 Nov 2018 16:03) (17 - 18)  SpO2: 97% (15 Nov 2018 16:03) (96% - 98%)      PHYSICAL EXAM  Constitutional - NAD, some right tran inattention but no neglect will track to right  HEENT - +right facial droop +thrush    Chest - Breathing comfortably, No wheezing  Cardiovascular - S1S2   Abdomen - Soft +distended, hypoactive BS No HSM, no R/G  no suprapubic pain  Extremities - No C/C/E, No calf tenderness , no erythema or warmth  hypotonic RUE +mld flexor tone RLE  Neuro:  Awake, alert, mute, globally aphasic, able to follow some simple commands to lift left arms, squeeze fingers, smile, left hemineglect   Cranial Nerves: right facial droop, right gaze palsy  Motor exam: dense right hemiplegia with increased tone in RUE and RLE. LUE and LLE against gravity without noticeable weakness   0/5 AROm RUE and LE  left Ue and LE 4-5/5  Sensation: grimaces to noxious stimuli  Coordination/ Gait: unable to participate with exam due to aphasic

## 2018-11-15 NOTE — PROGRESS NOTE ADULT - REASON FOR ADMISSION
right-sided weakness

## 2018-11-15 NOTE — H&P ADULT - NSHPREVIEWOFSYSTEMS_GEN_ALL_CORE
REVIEW OF SYSTEMS  Constitutional: No fever, No Chills, No fatigue  HEENT: No eye pain, No visual disturbances, No difficulty hearing  Pulm: No cough,  No shortness of breath  Cardio: No chest pain, No palpitations  GI:  No abdominal pain, No nausea, No vomiting, No diarrhea, No constipation  : No dysuria, No frequency, No hematuria  Neuro: No headaches, No memory loss, No loss of strength, No numbness, No tremors  Skin: No itching, No rashes, No lesions   Endo: No temperature intolerance  MSK: No joint pain, No joint swelling, No muscle pain, No Neck or back pain  Psych:  No depression, No anxiety REVIEW OF SYSTEMS unable to obtain as patient aphasic REVIEW OF SYSTEMS unable to obtain as patient aphasic  Attempted yes/no responses with use of hand gestures and head nods, however patient was inconsistent in reliability, even with simpler personal questions  +incontinent of urine and bowel  +had constipation +reduced appetite  appears to have some mild abdominal discomfort on repeated questions

## 2018-11-15 NOTE — PROGRESS NOTE ADULT - SUBJECTIVE AND OBJECTIVE BOX
Cc: R weakness, aphasia    HPI:   Patient tolerating therapies.  Max A with transfers and gait.    MEDICATIONS  (STANDING):  aspirin  chewable 81 milliGRAM(s) Oral daily  ceFAZolin   IVPB 1000 milliGRAM(s) IV Intermittent once  enoxaparin Injectable 40 milliGRAM(s) SubCutaneous daily  FLUoxetine 20 milliGRAM(s) Oral daily  influenza   Vaccine 0.5 milliLiter(s) IntraMuscular once  losartan 100 milliGRAM(s) Oral daily    MEDICATIONS  (PRN):    Vital Signs Last 24 Hrs  T(C): 36.6 (15 Nov 2018 08:30), Max: 37.1 (14 Nov 2018 23:29)  T(F): 97.8 (15 Nov 2018 08:30), Max: 98.8 (14 Nov 2018 23:29)  HR: 68 (15 Nov 2018 08:30) (58 - 68)  BP: 131/63 (15 Nov 2018 08:30) (108/81 - 133/62)  BP(mean): --  RR: 18 (15 Nov 2018 08:30) (18 - 18)  SpO2: 97% (15 Nov 2018 08:30) (96% - 98%)    PHYSICAL EXAM  Constitutional - NAD, Comfortable  HEENT - NCAT, EOMI  Extremities - No C/C/E, No calf tenderness   Neurologic Exam -                    Cognitive - Awake, Alert     Communication - + expressive aphasia     Motor - Right side 0/5     MAS 1+ extensor tone on the RLE        Psychiatric -Affect WNL      Impression:  85 yo with CVA with Right hemiparesis, aphasia, gait dysfunction      Plan:  PT- ROM, Bed Mob, Transfers, Amb w AD and bracing as needed  OT- ADLs, bracing  SLP- Dysphagia eval and treat  Prec- Falls, Cardiac  DVT Prophylaxis- lovenox  Skin- Turn q2 h  Dispo- Acute Rehab- can tolerate 3h/d PT/OT/SLP and requires daily physician visits  ELOS 5 wks  LTG- Min A with transfers/gait w ADs

## 2018-11-16 DIAGNOSIS — I63.9 CEREBRAL INFARCTION, UNSPECIFIED: ICD-10-CM

## 2018-11-16 DIAGNOSIS — I10 ESSENTIAL (PRIMARY) HYPERTENSION: ICD-10-CM

## 2018-11-16 DIAGNOSIS — R47.01 APHASIA: ICD-10-CM

## 2018-11-16 DIAGNOSIS — G81.91 HEMIPLEGIA, UNSPECIFIED AFFECTING RIGHT DOMINANT SIDE: ICD-10-CM

## 2018-11-16 LAB
ALBUMIN SERPL ELPH-MCNC: 3.2 G/DL — LOW (ref 3.3–5)
ALP SERPL-CCNC: 85 U/L — SIGNIFICANT CHANGE UP (ref 40–120)
ALT FLD-CCNC: 50 U/L DA — HIGH (ref 10–45)
ANION GAP SERPL CALC-SCNC: 8 MMOL/L — SIGNIFICANT CHANGE UP (ref 5–17)
AST SERPL-CCNC: 48 U/L — HIGH (ref 10–40)
BASOPHILS # BLD AUTO: 0 K/UL — SIGNIFICANT CHANGE UP (ref 0–0.2)
BASOPHILS NFR BLD AUTO: 0.6 % — SIGNIFICANT CHANGE UP (ref 0–2)
BILIRUB SERPL-MCNC: 0.4 MG/DL — SIGNIFICANT CHANGE UP (ref 0.2–1.2)
BUN SERPL-MCNC: 19 MG/DL — SIGNIFICANT CHANGE UP (ref 7–23)
CALCIUM SERPL-MCNC: 9.8 MG/DL — SIGNIFICANT CHANGE UP (ref 8.4–10.5)
CHLORIDE SERPL-SCNC: 105 MMOL/L — SIGNIFICANT CHANGE UP (ref 96–108)
CO2 SERPL-SCNC: 27 MMOL/L — SIGNIFICANT CHANGE UP (ref 22–31)
CREAT SERPL-MCNC: 0.81 MG/DL — SIGNIFICANT CHANGE UP (ref 0.5–1.3)
EOSINOPHIL # BLD AUTO: 0.1 K/UL — SIGNIFICANT CHANGE UP (ref 0–0.5)
EOSINOPHIL NFR BLD AUTO: 2.2 % — SIGNIFICANT CHANGE UP (ref 0–6)
GLUCOSE SERPL-MCNC: 94 MG/DL — SIGNIFICANT CHANGE UP (ref 70–99)
HCT VFR BLD CALC: 35.4 % — SIGNIFICANT CHANGE UP (ref 34.5–45)
HGB BLD-MCNC: 11.7 G/DL — SIGNIFICANT CHANGE UP (ref 11.5–15.5)
LYMPHOCYTES # BLD AUTO: 1.4 K/UL — SIGNIFICANT CHANGE UP (ref 1–3.3)
LYMPHOCYTES # BLD AUTO: 30.2 % — SIGNIFICANT CHANGE UP (ref 13–44)
MCHC RBC-ENTMCNC: 32.8 PG — SIGNIFICANT CHANGE UP (ref 27–34)
MCHC RBC-ENTMCNC: 32.9 GM/DL — SIGNIFICANT CHANGE UP (ref 32–36)
MCV RBC AUTO: 99.7 FL — SIGNIFICANT CHANGE UP (ref 80–100)
MONOCYTES # BLD AUTO: 0.5 K/UL — SIGNIFICANT CHANGE UP (ref 0–0.9)
MONOCYTES NFR BLD AUTO: 11.3 % — HIGH (ref 1–9)
NEUTROPHILS # BLD AUTO: 2.5 K/UL — SIGNIFICANT CHANGE UP (ref 1.8–7.4)
NEUTROPHILS NFR BLD AUTO: 55.6 % — SIGNIFICANT CHANGE UP (ref 43–77)
PLATELET # BLD AUTO: 249 K/UL — SIGNIFICANT CHANGE UP (ref 150–400)
POTASSIUM SERPL-MCNC: 4.1 MMOL/L — SIGNIFICANT CHANGE UP (ref 3.5–5.3)
POTASSIUM SERPL-SCNC: 4.1 MMOL/L — SIGNIFICANT CHANGE UP (ref 3.5–5.3)
PROT SERPL-MCNC: 6.3 G/DL — SIGNIFICANT CHANGE UP (ref 6–8.3)
RBC # BLD: 3.55 M/UL — LOW (ref 3.8–5.2)
RBC # FLD: 12.1 % — SIGNIFICANT CHANGE UP (ref 10.3–14.5)
SODIUM SERPL-SCNC: 140 MMOL/L — SIGNIFICANT CHANGE UP (ref 135–145)
WBC # BLD: 4.5 K/UL — SIGNIFICANT CHANGE UP (ref 3.8–10.5)
WBC # FLD AUTO: 4.5 K/UL — SIGNIFICANT CHANGE UP (ref 3.8–10.5)

## 2018-11-16 PROCEDURE — 74018 RADEX ABDOMEN 1 VIEW: CPT | Mod: 26

## 2018-11-16 PROCEDURE — 99223 1ST HOSP IP/OBS HIGH 75: CPT

## 2018-11-16 PROCEDURE — 99254 IP/OBS CNSLTJ NEW/EST MOD 60: CPT

## 2018-11-16 RX ORDER — SENNA PLUS 8.6 MG/1
2 TABLET ORAL AT BEDTIME
Qty: 0 | Refills: 0 | Status: DISCONTINUED | OUTPATIENT
Start: 2018-11-16 | End: 2018-12-11

## 2018-11-16 RX ORDER — DOCUSATE SODIUM 100 MG
100 CAPSULE ORAL THREE TIMES A DAY
Qty: 0 | Refills: 0 | Status: DISCONTINUED | OUTPATIENT
Start: 2018-11-16 | End: 2018-12-12

## 2018-11-16 RX ORDER — NYSTATIN 500MM UNIT
500000 POWDER (EA) MISCELLANEOUS THREE TIMES A DAY
Qty: 0 | Refills: 0 | Status: DISCONTINUED | OUTPATIENT
Start: 2018-11-16 | End: 2018-12-11

## 2018-11-16 RX ADMIN — SENNA PLUS 2 TABLET(S): 8.6 TABLET ORAL at 21:42

## 2018-11-16 RX ADMIN — LOSARTAN POTASSIUM 100 MILLIGRAM(S): 100 TABLET, FILM COATED ORAL at 05:50

## 2018-11-16 RX ADMIN — Medication 100 MILLIGRAM(S): at 16:46

## 2018-11-16 RX ADMIN — Medication 500000 UNIT(S): at 21:42

## 2018-11-16 RX ADMIN — Medication 100 MILLIGRAM(S): at 21:42

## 2018-11-16 RX ADMIN — Medication 81 MILLIGRAM(S): at 12:23

## 2018-11-16 RX ADMIN — Medication 20 MILLIGRAM(S): at 12:23

## 2018-11-16 RX ADMIN — ENOXAPARIN SODIUM 40 MILLIGRAM(S): 100 INJECTION SUBCUTANEOUS at 11:09

## 2018-11-16 NOTE — PROGRESS NOTE ADULT - SUBJECTIVE AND OBJECTIVE BOX
Patient is a 86y old  Female who presents with a chief complaint of CVA (16 Nov 2018 08:09)      HPI:  86 year old woman with multiple vascular risk factors, including age and HTN was transferred from [facility] to [facility] for acute onset of right-sided weakness. She was noted to have developed acute onset of right-sided weakness and language disturbance. She was initially evaluated at [facility] through tele-stroke and was treated with IV tPA (Admission NIHSS 22). MRI brain showed left CYNDEE and MCA distribution infarct with mild hemorrhagic transformation (HI 1). 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    During hospital stay was neurologically without acute change: remains with global aphasia, right hemiplegia and bedbound due to functional quadriplegia. Permissive hypertension was slowly titrated to normotension. Course was complicated by urinary retention which was managed with PRN straight catheterization. MBS was performed and patient was placed on a Dysphagia 1 honey diet which has been well-tolerated. Lipitor discontinued due to admission LDL of 104. Family meeting to discuss goals of care done on 11/8 2pm, family decided code status DNR/DNI. Discussed and completed MOLST form. Briefly,  pt is DNR/DNI,  TRIAL OF NG TUBE IF BECOMES DYSPHASIC, IV FLUIDS AS NEEDED,  ANTIBIOTICS FOR INFECTION, RETURN TO HOSPITAL, FULL MEDICAL MANAGEMENT SHORT OF INTUBATION AND RESUSCITATION. Cardiac monitoring revealed no events. TTE shows EF 74% no evidence of thrombus or PFO.  Deemed medically stable for acute rehab. (15 Nov 2018 16:23)      PAST MEDICAL & SURGICAL HISTORY:  HTN (hypertension)  Hypertension  S/P appendectomy      MEDICATIONS  (STANDING):  aspirin  chewable 81 milliGRAM(s) Oral daily  docusate sodium 100 milliGRAM(s) Oral three times a day  enoxaparin Injectable 40 milliGRAM(s) SubCutaneous daily  FLUoxetine 20 milliGRAM(s) Oral daily  losartan 100 milliGRAM(s) Oral daily  senna 2 Tablet(s) Oral at bedtime    MEDICATIONS  (PRN):  acetaminophen   Tablet .. 650 milliGRAM(s) Oral every 6 hours PRN Temp greater or equal to 38C (100.4F), Mild Pain (1 - 3)      Allergies    No Known Allergies    Intolerances        TODAY'S SUBJECTIVE & REVIEW OF SYMPTOMS:  Patient was seen and examined today. There were no acute events overnight. As per the daughter the patient slept well last night but she sometimes is holding her stomach appearing in pain and looks uncomfortable when she eats. Patient was able to follow simple commands like open and close your eyes and open your mouth but it was unclear whether she was able to communicate "yes" or "no" with her hand signals as responses were inconsistent. Daughter was at bedside and   Swedish  Bernice ID # [id number] was used.    Daughter not sure when last bowel movement was and patient is incontinent of urine and going in diaper. Will place on standing bowel regimen and do bladder scans.  Patient is non-verbal, mumbles.    PHYSICAL EXAM  86y  Vital Signs Last 24 Hrs  T(C): 36.9 (16 Nov 2018 10:05), Max: 37 (15 Nov 2018 16:03)  T(F): 98.4 (16 Nov 2018 10:05), Max: 98.6 (15 Nov 2018 16:03)  HR: 79 (16 Nov 2018 10:05) (63 - 79)  BP: 108/65 (16 Nov 2018 10:05) (108/65 - 132/66)  BP(mean): --  RR: 14 (16 Nov 2018 10:05) (14 - 18)  SpO2: 97% (16 Nov 2018 10:05) (96% - 97%)  Daily Height in cm: 144.78 (15 Nov 2018 22:07)    Daily     General: NAD  Communication- follows simple commands, unable to communicate "yes" or "no"    Resp: CTAB  Cardio: +S1, S2  Abdomen: +distended abdomen, +hypoactive bowel sounds  Extremities: no edema or calf tenderness  Motor: RUE flaccid, no subluxation; RLE increased flexor tone  Neuro: awake, alert  Skin: intact      RECENT LABS:                          11.7   4.5   )-----------( 249      ( 16 Nov 2018 05:15 )             35.4     11-16    140  |  105  |  19  ----------------------------<  94  4.1   |  27  |  0.81    Ca    9.8      16 Nov 2018 05:15    TPro  6.3  /  Alb  3.2<L>  /  TBili  0.4  /  DBili  x   /  AST  48<H>  /  ALT  50<H>  /  AlkPhos  85  11-16    LIVER FUNCTIONS - ( 16 Nov 2018 05:15 )  Alb: 3.2 g/dL / Pro: 6.3 g/dL / ALK PHOS: 85 U/L / ALT: 50 U/L DA / AST: 48 U/L / GGT: x Patient is a 86y old  Female who presents with a chief complaint of CVA (16 Nov 2018 08:09)      HPI:  86 year old woman with multiple vascular risk factors, including age and HTN was transferred from [facility] to [facility] for acute onset of right-sided weakness. She was noted to have developed acute onset of right-sided weakness and language disturbance. She was initially evaluated at [facility] through tele-stroke and was treated with IV tPA (Admission NIHSS 22). MRI brain showed left CYNDEE and MCA distribution infarct with mild hemorrhagic transformation (HI 1). 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    During hospital stay was neurologically without acute change: remains with global aphasia, right hemiplegia and bedbound due to functional quadriplegia. Permissive hypertension was slowly titrated to normotension. Course was complicated by urinary retention which was managed with PRN straight catheterization. MBS was performed and patient was placed on a Dysphagia 1 honey diet which has been well-tolerated. Lipitor discontinued due to admission LDL of 104. Family meeting to discuss goals of care done on 11/8 2pm, family decided code status DNR/DNI. Discussed and completed MOLST form. Briefly,  pt is DNR/DNI,  TRIAL OF NG TUBE IF BECOMES DYSPHASIC, IV FLUIDS AS NEEDED,  ANTIBIOTICS FOR INFECTION, RETURN TO HOSPITAL, FULL MEDICAL MANAGEMENT SHORT OF INTUBATION AND RESUSCITATION. Cardiac monitoring revealed no events. TTE shows EF 74% no evidence of thrombus or PFO.  Deemed medically stable for acute rehab. (15 Nov 2018 16:23)      PAST MEDICAL & SURGICAL HISTORY:  HTN (hypertension)  Hypertension  S/P appendectomy      MEDICATIONS  (STANDING):  aspirin  chewable 81 milliGRAM(s) Oral daily  docusate sodium 100 milliGRAM(s) Oral three times a day  enoxaparin Injectable 40 milliGRAM(s) SubCutaneous daily  FLUoxetine 20 milliGRAM(s) Oral daily  losartan 100 milliGRAM(s) Oral daily  senna 2 Tablet(s) Oral at bedtime    MEDICATIONS  (PRN):  acetaminophen   Tablet .. 650 milliGRAM(s) Oral every 6 hours PRN Temp greater or equal to 38C (100.4F), Mild Pain (1 - 3)      Allergies    No Known Allergies    Intolerances        TODAY'S SUBJECTIVE & REVIEW OF SYMPTOMS:  Patient was seen and examined today. There were no acute events overnight. As per the daughter the patient slept well last night but she sometimes is holding her stomach appearing in pain and looks uncomfortable when she eats. NO nausea or vomiting    Patient was able to follow simple commands like open and close your eyes and open your mouth but it was unclear whether she was able to communicate "yes" or "no" with her hand signals as responses were inconsistent and perseverative. Patient was not able to use gestures consistently for yes/no,  Daughter was at bedside and   Khmer  Bernice ID # [id number] was used.    Daughter not sure when last bowel movement was and patient is incontinent of urine and going in diaper. Will place on standing bowel regimen and do bladder scans.  Patient is non-verbal, mumbles.    PHYSICAL EXAM  86y  Vital Signs Last 24 Hrs  T(C): 36.9 (16 Nov 2018 10:05), Max: 37 (15 Nov 2018 16:03)  T(F): 98.4 (16 Nov 2018 10:05), Max: 98.6 (15 Nov 2018 16:03)  HR: 79 (16 Nov 2018 10:05) (63 - 79)  BP: 108/65 (16 Nov 2018 10:05) (108/65 - 132/66)  BP(mean): --  RR: 14 (16 Nov 2018 10:05) (14 - 18)  SpO2: 97% (16 Nov 2018 10:05) (96% - 97%)  Daily Height in cm: 144.78 (15 Nov 2018 22:07)    Daily     General: NAD  Communication- follows simple commands, unable to communicate "yes" or "no"  +oral thrush    Resp: no respiratory distress. no cough. clear  Cardio: +S1, S2 HR 79 regular  Abdomen: +distended abdomen, +hypoactive bowel sounds  no R/G no HSM  no suprapubic pain  Extremities: no edema or calf tenderness  Motor: RUE flaccid, no subluxation; RLE increased flexor tone  no withdrawal to plantar stimulation, but family notes occasionally in RLE  reduced awareness/sensation RUE and LE  Neuro: awake, alert  Skin: intact      RECENT LABS:                          11.7   4.5   )-----------( 249      ( 16 Nov 2018 05:15 )             35.4     11-16    140  |  105  |  19  ----------------------------<  94  4.1   |  27  |  0.81    Ca    9.8      16 Nov 2018 05:15    TPro  6.3  /  Alb  3.2<L>  /  TBili  0.4  /  DBili  x   /  AST  48<H>  /  ALT  50<H>  /  AlkPhos  85  11-16    LIVER FUNCTIONS - ( 16 Nov 2018 05:15 )  Alb: 3.2 g/dL / Pro: 6.3 g/dL / ALK PHOS: 85 U/L / ALT: 50 U/L DA / AST: 48 U/L / GGT: x

## 2018-11-16 NOTE — CONSULT NOTE ADULT - PROBLEM SELECTOR RECOMMENDATION 9
PT/OT/Rehab  cont asa  no statin??? ldl 104 - goal <100 but family discussion held at prior hospital.

## 2018-11-16 NOTE — CONSULT NOTE ADULT - SUBJECTIVE AND OBJECTIVE BOX
HPI:  86 year old woman with multiple vascular risk factors, including age and HTN was transferred from OSH to [facility] for acute onset of right-sided weakness. She was noted to have developed acute onset of right-sided weakness and language disturbance. She was initially evaluated at [facility] through tele-stroke and was treated with IV tPA (Admission NIHSS 22). MRI brain showed left CYNDEE and MCA distribution infarct with mild hemorrhagic transformation (HI 1). 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    During hospital stay was neurologically without acute change: remains with global aphasia, right hemiplegia and bedbound due to functional quadriplegia. Permissive hypertension was slowly titrated to normotension. Course was complicated by urinary retention which was managed with PRN straight catheterization. MBS was performed and patient was placed on a Dysphagia 1 honey diet which has been well-tolerated. Lipitor discontinued due to admission LDL of 104. Family meeting to discuss goals of care done on 11/8 2pm, family decided code status DNR/DNI. Discussed and completed MOLST form. Briefly,  pt is DNR/DNI,  TRIAL OF NG TUBE IF BECOMES DYSPHASIC, IV FLUIDS AS NEEDED,  ANTIBIOTICS FOR INFECTION, RETURN TO HOSPITAL, FULL MEDICAL MANAGEMENT SHORT OF INTUBATION AND RESUSCITATION. Cardiac monitoring revealed no events. TTE shows EF 74% no evidence of thrombus or PFO.  Deemed medically stable for acute rehab. (15 Nov 2018 16:23)    In Summary:  86W PMH HTN presented to OSH for right sided weakness, transferred to [facility]       PAST MEDICAL & SURGICAL HISTORY:  HTN (hypertension)  Hypertension  S/P appendectomy    family history reviewed     Substance Use (street drugs): ( X ) never used  (  ) other:  Tobacco Usage:  ( X  ) never smoked   (   ) former smoker   (   ) current smoker  (     ) pack year  Alcohol Usage: no  Sexual History:  no  Recent Travel: no    Allergies    No Known Allergies    Intolerances    REVIEW OF SYSTEMS:  CONSTITUTIONAL: No fever, weight loss, or fatigue  EYES: No eye pain, visual disturbances, or discharge  ENMT:  No difficulty hearing, tinnitus, vertigo; No sinus or throat pain  NECK: No pain or stiffness  BREASTS: No pain, masses, or nipple discharge  RESPIRATORY: No cough, wheezing, chills or hemoptysis; No shortness of breath  CARDIOVASCULAR: No chest pain, palpitations, dizziness, or leg swelling  GASTROINTESTINAL: No abdominal or epigastric pain. No nausea, vomiting, or hematemesis; No diarrhea or constipation. No melena or hematochezia.  GENITOURINARY: No dysuria, frequency, hematuria, or incontinence  NEUROLOGICAL: No headaches, memory loss, loss of strength, numbness, or tremors  SKIN: No itching, burning, rashes, or lesions   LYMPH NODES: No enlarged glands  ENDOCRINE: No heat or cold intolerance; No hair loss  MUSCULOSKELETAL: No joint pain or swelling; No muscle, back, or extremity pain  PSYCHIATRIC: No depression, anxiety, mood swings, or difficulty sleeping  HEME/LYMPH: No easy bruising, or bleeding gums  ALLERY AND IMMUNOLOGIC: No hives or eczema    ALL ROS REVIEWED AND NORMAL EXCEPT AS STATED ABOVE    T(C): 36.7 (11-15-18 @ 22:07), Max: 37 (11-15-18 @ 16:03)  HR: 66 (11-16-18 @ 05:48) (63 - 68)  BP: 129/82 (11-16-18 @ 05:48) (113/64 - 132/66)  RR: 14 (11-16-18 @ 05:48) (14 - 18)  SpO2: 96% (11-16-18 @ 05:48) (96% - 97%)  Wt(kg): --Vital Signs Last 24 Hrs  T(C): 36.7 (15 Nov 2018 22:07), Max: 37 (15 Nov 2018 16:03)  T(F): 98 (15 Nov 2018 22:07), Max: 98.6 (15 Nov 2018 16:03)  HR: 66 (16 Nov 2018 05:48) (63 - 68)  BP: 129/82 (16 Nov 2018 05:48) (113/64 - 132/66)  BP(mean): --  RR: 14 (16 Nov 2018 05:48) (14 - 18)  SpO2: 96% (16 Nov 2018 05:48) (96% - 97%)    PHYSICAL EXAM:  GENERAL: NAD, well-groomed, well-developed  HEAD:  Atraumatic, Normocephalic  EYES: EOMI, PERRLA, conjunctiva and sclera clear  ENMT: No tonsillar erythema, exudates, or enlargement; Moist mucous membranes, Good dentition, No lesions  NECK: Supple, No JVD, Normal thyroid  NERVOUS SYSTEM:  Alert & Oriented X3, Good concentration; Motor Strength 5/5 B/L upper and lower extremities; DTRs 2+ intact and symmetric  CHEST/LUNG: Clear to percussion bilaterally; No rales, rhonchi, wheezing, or rubs  HEART: Regular rate and rhythm; No murmurs, rubs, or gallops  ABDOMEN: Soft, Nontender, Nondistended; Bowel sounds present  EXTREMITIES:  2+ Peripheral Pulses, No clubbing, cyanosis, or edema  LYMPH: No lymphadenopathy noted  SKIN: No rashes or lesions    LABS:             11.7   4.5   )-----------( 249      ( 16 Nov 2018 05:15 )             35.4     11-16    140  |  105  |  19  ----------------------------<  94  4.1   |  27  |  0.81    Ca    9.8      16 Nov 2018 05:15    TPro  6.3  /  Alb  3.2<L>  /  TBili  0.4  /  DBili  x   /  AST  48<H>  /  ALT  50<H>  /  AlkPhos  85  11-16    CAPILLARY BLOOD GLUCOSE    RADIOLOGY & ADDITIONAL TESTS:    Consultant(s) Notes Reviewed:  [x ] YES  [ ] NO  Care Discussed with Consultants/Other Providers [ x] YES  [ ] NO  Imaging Personally Reviewed:  [ ] YES  [ ] NO

## 2018-11-16 NOTE — DIETITIAN INITIAL EVALUATION ADULT. - OTHER INFO
86yr Old Female - Dx: CVA - Initial Assessment - Puree Diet w/ Honey Thick Liquids, Denies Food Allergy, Tolerates Diet, No Chewing/Swallowing Complications (Per Pt), Consumes 50% of Meals, No Pressure Ulcers, No Edema, No Recent N/V/D/C

## 2018-11-16 NOTE — CONSULT NOTE ADULT - SUBJECTIVE AND OBJECTIVE BOX
Patient is a 86y old  Female who presents with a chief complaint of CVA (16 Nov 2018 08:01)      HPI:  HPI:  86 year old woman with multiple vascular risk factors, including age and HTN was transferred from [facility] to [facility] for acute onset of right-sided weakness. She was noted to have developed acute onset of right-sided weakness and language disturbance. She was initially evaluated at [facility] through tele-stroke and was treated with IV tPA (Admission NIHSS 22). MRI brain showed left CYNDEE and MCA distribution infarct with mild hemorrhagic transformation (HI 1). 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    During hospital stay was neurologically without acute change: remains with global aphasia, right hemiplegia and bedbound due to functional quadriplegia. Permissive hypertension was slowly titrated to normotension. Course was complicated by urinary retention which was managed with PRN straight catheterization. MBS was performed and patient was placed on a Dysphagia 1 honey diet which has been well-tolerated. Lipitor discontinued due to admission LDL of 104. Family meeting to discuss goals of care done on 11/8 2pm, family decided code status DNR/DNI. Discussed and completed MOLST form. Briefly,  pt is DNR/DNI,  TRIAL OF NG TUBE IF BECOMES DYSPHASIC, IV FLUIDS AS NEEDED,  ANTIBIOTICS FOR INFECTION, RETURN TO HOSPITAL, FULL MEDICAL MANAGEMENT SHORT OF INTUBATION AND RESUSCITATION. Cardiac monitoring revealed no events. TTE shows EF 74% no evidence of thrombus or PFO.  Deemed medically stable for acute rehab. (15 Nov 2018 16:23)    In Summary:  86W PMH HTN, who initially presented to [facility] and transferred to [facility] for acute right sided weakness and aphasia.  Patient was evaluated by telestroke and given tPA (NIHSS 22).  MRI showed CYNDEE and MCA distribution stroke with mild hemorrhagic transformation.  Patient remained with global aphasia, right hemiplegia and functional quadriplegia.  Hospitalist course was complicated by urinary retention.  Patient passed MBS and on dysphagia 1 honey diet.  No events on cardiac monitor.  TTE without significant abnormalities.      PAST MEDICAL & SURGICAL HISTORY:  HTN (hypertension)  Hypertension  S/P appendectomy    family history reviewed and non-contributory to current illness.      Substance Use (street drugs): ( X ) never used  (  ) other:  Tobacco Usage:  (X   ) never smoked   (   ) former smoker   (   ) current smoker  (     ) pack year  Alcohol Usage: no  Sexual History:  no  Recent Travel: no    Allergies    No Known Allergies    Intolerances    REVIEW OF SYSTEMS:  unable given aphasia    ALL ROS REVIEWED AND NORMAL EXCEPT AS STATED ABOVE    T(C): 36.7 (11-15-18 @ 22:07), Max: 37 (11-15-18 @ 16:03)  HR: 66 (11-16-18 @ 05:48) (63 - 68)  BP: 129/82 (11-16-18 @ 05:48) (113/64 - 132/66)  RR: 14 (11-16-18 @ 05:48) (14 - 18)  SpO2: 96% (11-16-18 @ 05:48) (96% - 97%)  Wt(kg): --Vital Signs Last 24 Hrs  T(C): 36.7 (15 Nov 2018 22:07), Max: 37 (15 Nov 2018 16:03)  T(F): 98 (15 Nov 2018 22:07), Max: 98.6 (15 Nov 2018 16:03)  HR: 66 (16 Nov 2018 05:48) (63 - 68)  BP: 129/82 (16 Nov 2018 05:48) (113/64 - 132/66)  BP(mean): --  RR: 14 (16 Nov 2018 05:48) (14 - 18)  SpO2: 96% (16 Nov 2018 05:48) (96% - 97%)    PHYSICAL EXAM:  GENERAL: NAD  HEAD:  Atraumatic, Normocephalic  EYES: EOMI, PERRLA, conjunctiva and sclera clear  ENMT: No tonsillar erythema, exudates, or enlargement; Moist mucous membranes, Good dentition, No lesions  NECK: Supple, No JVD, Normal thyroid  NERVOUS SYSTEM:  Alert, aphasic, right hemiparesis,  CHEST/LUNG: Clear to percussion bilaterally; No rales, rhonchi, wheezing, or rubs  HEART: Regular rate and rhythm; No murmurs, rubs, or gallops  ABDOMEN: Soft, Nontender, Nondistended; Bowel sounds present  EXTREMITIES:  2+ Peripheral Pulses, No clubbing, cyanosis, or edema  LYMPH: No lymphadenopathy noted  SKIN: No rashes or lesions    LABS:                        11.7   4.5   )-----------( 249      ( 16 Nov 2018 05:15 )             35.4     11-16    140  |  105  |  19  ----------------------------<  94  4.1   |  27  |  0.81    Ca    9.8      16 Nov 2018 05:15    TPro  6.3  /  Alb  3.2<L>  /  TBili  0.4  /  DBili  x   /  AST  48<H>  /  ALT  50<H>  /  AlkPhos  85  11-16    CAPILLARY BLOOD GLUCOSE    RADIOLOGY & ADDITIONAL TESTS:    Consultant(s) Notes Reviewed:  [x ] YES  [ ] NO  Care Discussed with Consultants/Other Providers [ x] YES  [ ] NO  Imaging Personally Reviewed:  [ ] YES  [ ] NO

## 2018-11-16 NOTE — PROGRESS NOTE ADULT - ASSESSMENT
ASSESSMENT/PLAN  86 year-old woman with a PMH of HTN found to have left ICA (MCA + CYNDEE) stroke s/p tPA  with Gait Instability, ADL impairments and Functional impairments and cognitive impairments    COMORBIDITES/ACTIVE MEDICAL ISSUES   Left ICA stroke unknown etiology- with global aphasia and functional quadriplegia  -Aspirin  -Statin discontinued as stroke not thought to be of atherosclerotic origin  -Fluoxetine for motor recovery after stroke     Cardiovascular- no PFO on ECHO, EF 74%, HTN  -Losartan    GI- failed MBS, some abdominal distenstion and slightly elevated LFTs  -Abdominal Xray ordered  -Decrease Tylenol dose to 325mg prn  -Trend LFTs  -Colace and Senna changed from PRN to standing orders  -bladder scans q12h, straight cath if retaining  -Pureed with honey thickened liquids    Gait Instability, ADL impairments and Functional impairments: start Comprehensive Rehab Program of PT/OT       Code Status: As per meeting with Palliative Team at [facility]  pt is DNR/DNI,  TRIAL OF NG TUBE IF BECOMES DYSPHASIC, IV FLUIDS AS NEEDED,  ANTIBIOTICS FOR INFECTION, RETURN TO HOSPITAL, FULL MEDICAL MANAGEMENT SHORT OF INTUBATION AND RESUSCITATION  -f/u MOLST form    LABS:  CBC BMP 11/19 Monday ASSESSMENT/PLAN  86 year-old woman with a PMH of HTN found to have left ICA (MCA + CYNDEE) stroke s/p tPA  with dense right HP, global aphasia, dysphagia, hemisensory deficits    # Left ICA stroke unknown etiology- with global aphasia and functional quadriplegia  -Aspirin, Statin discontinued as stroke not thought to be of atherosclerotic origin  -Fluoxetine for motor recovery after stroke   -OT, PT, SLP services    Cardiovascular- no PFO on ECHO, EF 74%, HTN  -Losartan  -hospitalist     GI- failed MBS, some abdominal distenstion and slightly elevated LFTs  -Abdominal Xray ordered 11/16. bladder scan  -Decrease Tylenol dose to 325mg prn  -Trend LFTs; will repeat mon 11/19  -Colace and Senna changed from PRN to standing orders  -bladder scans q12h, straight cath if retaining  -Pureed with honey thickened liquids  -discussed with family    #Code Status: As per meeting with Palliative Team at [facility]  pt is DNR/DNI,  TRIAL OF NG TUBE IF BECOMES DYSPHASIC, IV FLUIDS AS NEEDED,  ANTIBIOTICS FOR INFECTION, RETURN TO HOSPITAL, FULL MEDICAL MANAGEMENT SHORT OF INTUBATION AND RESUSCITATION  -f/u MOLST form    LABS:  CBC BMP, LFT's 11/19 Monday  AXR ASSESSMENT/PLAN  86 year-old woman with a PMH of HTN found to have left ICA (MCA + CYNDEE) stroke s/p tPA  with dense right HP, global aphasia, dysphagia, hemisensory deficits    # Left ICA stroke unknown etiology- with global aphasia and functional quadriplegia  -Aspirin, Statin discontinued as stroke not thought to be of atherosclerotic origin  -Fluoxetine for motor recovery after stroke   -OT, PT, SLP services    Cardiovascular- no PFO on ECHO, EF 74%, HTN  -Losartan  -hospitalist     GI- failed MBS, some abdominal distenstion and slightly elevated LFTs  -Abdominal Xray ordered 11/16. bladder scan  -Decrease Tylenol dose to 325mg prn  -Trend LFTs; will repeat mon 11/19  -Colace and Senna changed from PRN to standing orders  -bladder scans q12h, straight cath if retaining  -Pureed with honey thickened liquids  -discussed with family    #Code Status: As per meeting with Palliative Team at [facility]  pt is DNR/DNI,  TRIAL OF NG TUBE IF BECOMES DYSPHASIC, IV FLUIDS AS NEEDED,  ANTIBIOTICS FOR INFECTION, RETURN TO HOSPITAL, FULL MEDICAL MANAGEMENT SHORT OF INTUBATION AND RESUSCITATION  -f/u MOLST form    LABS:  CBC BMP, LFT's 11/19 Monday      addendum: AXR without evidence obstruction  cleared for mechanical soft and thin liquids with supervision ASSESSMENT/PLAN  86 year-old woman with a PMH of HTN found to have left ICA (MCA + CYNDEE) stroke s/p tPA  with dense right HP, global aphasia, dysphagia, hemisensory deficits    # Left ICA stroke unknown etiology- with global aphasia and functional quadriplegia  -Aspirin, Statin discontinued as stroke not thought to be of atherosclerotic origin  -Fluoxetine for motor recovery after stroke   -OT, PT, SLP services    Cardiovascular- no PFO on ECHO, EF 74%, HTN  -Losartan  -hospitalist     GI- failed MBS, some abdominal distenstion and slightly elevated LFTs  -Abdominal Xray ordered 11/16. bladder scan  -Decrease Tylenol dose to 325mg prn  -Trend LFTs; will repeat mon 11/19  -Colace and Senna changed from PRN to standing orders  -bladder scans q12h, straight cath if retaining  -Pureed with honey thickened liquids  -discussed with family    #Code Status: As per meeting with Palliative Team at [facility]  pt is DNR/DNI,  TRIAL OF NG TUBE IF BECOMES DYSPHASIC, IV FLUIDS AS NEEDED,  ANTIBIOTICS FOR INFECTION, RETURN TO HOSPITAL, FULL MEDICAL MANAGEMENT SHORT OF INTUBATION AND RESUSCITATION  -f/u MOLST form    LABS:  CBC BMP, LFT's 11/19 Monday      addendum: AXR without evidence obstruction

## 2018-11-16 NOTE — CHART NOTE - NSCHARTNOTEFT_GEN_A_CORE
EXAM:  XR ABDOMEN PORTABLE ROUTINE 1V      PROCEDURE DATE:  11/16/2018        INTERPRETATION:  Abdomen radiographs      CPT 49277    CLINICAL INFORMATION:  Patient is unable to communicate.  Abdominal pain.    TECHNIQUE:  Supine view of the abdomen was obtained.    FINDINGS:   No prior exams are available for review.    The osseous structures are intact. Oral contrast is seen in portions of   large bowel. Nonspecific bowel gas pattern is noted.    IMPRESSION:  Nonspecific bowel gas pattern. Oral contrast is seen within   segments of the large bowel.             Bowel regimen ordered

## 2018-11-16 NOTE — CONSULT NOTE ADULT - ASSESSMENT
86W PMH HTN, who initially presented to [facility] and transferred to [facility] for acute right sided weakness and aphasia found to have left CYNDEE/MCA distribution stroke.

## 2018-11-17 LAB — PREALB SERPL-MCNC: 18 MG/DL — LOW (ref 20–40)

## 2018-11-17 PROCEDURE — 99232 SBSQ HOSP IP/OBS MODERATE 35: CPT

## 2018-11-17 RX ORDER — POLYETHYLENE GLYCOL 3350 17 G/17G
17 POWDER, FOR SOLUTION ORAL DAILY
Qty: 0 | Refills: 0 | Status: DISCONTINUED | OUTPATIENT
Start: 2018-11-17 | End: 2018-12-11

## 2018-11-17 RX ADMIN — Medication 81 MILLIGRAM(S): at 11:27

## 2018-11-17 RX ADMIN — POLYETHYLENE GLYCOL 3350 17 GRAM(S): 17 POWDER, FOR SOLUTION ORAL at 12:26

## 2018-11-17 RX ADMIN — Medication 100 MILLIGRAM(S): at 05:51

## 2018-11-17 RX ADMIN — Medication 500000 UNIT(S): at 14:20

## 2018-11-17 RX ADMIN — LOSARTAN POTASSIUM 100 MILLIGRAM(S): 100 TABLET, FILM COATED ORAL at 05:52

## 2018-11-17 RX ADMIN — Medication 20 MILLIGRAM(S): at 11:27

## 2018-11-17 RX ADMIN — Medication 10 MILLIGRAM(S): at 04:34

## 2018-11-17 RX ADMIN — Medication 100 MILLIGRAM(S): at 21:45

## 2018-11-17 RX ADMIN — Medication 500000 UNIT(S): at 05:52

## 2018-11-17 RX ADMIN — ENOXAPARIN SODIUM 40 MILLIGRAM(S): 100 INJECTION SUBCUTANEOUS at 11:27

## 2018-11-17 RX ADMIN — Medication 500000 UNIT(S): at 21:45

## 2018-11-17 NOTE — PROGRESS NOTE ADULT - ASSESSMENT
ASSESSMENT/PLAN  86 year-old woman with a PMH of HTN found to have left ICA (MCA + CYNDEE) stroke s/p tPA  with dense right HP, global aphasia, dysphagia, hemisensory deficits    # Left ICA stroke unknown etiology- with global aphasia and functional quadriplegia  -Aspirin, Statin discontinued as stroke not thought to be of atherosclerotic origin  -Fluoxetine for motor recovery after stroke   -OT, PT, SLP services    Cardiovascular- no PFO on ECHO, EF 74%, HTN  -Losartan  -hospitalist     GI- failed MBS, some abdominal distenstion and slightly elevated LFTs  -Decrease Tylenol dose to 325mg prn  -Trend LFTs; will repeat mon 11/19  -Colace and Senna changed from PRN to standing orders  -bladder scans q12h, straight cath if retaining  -Pureed with honey thickened liquids    #Code Status: As per meeting with Palliative Team at [facility]  pt is DNR/DNI,  TRIAL OF NG TUBE IF BECOMES DYSPHASIC, IV FLUIDS AS NEEDED,  ANTIBIOTICS FOR INFECTION, RETURN TO HOSPITAL, FULL MEDICAL MANAGEMENT SHORT OF INTUBATION AND RESUSCITATION  -f/u MOLST form    LABS:  CBC BMP, LFT's 11/19 Monday

## 2018-11-17 NOTE — PROGRESS NOTE ADULT - SUBJECTIVE AND OBJECTIVE BOX
Patient is a 86y old  Female who presents with a chief complaint of CVA (16 Nov 2018 08:09)      HPI:  86 year old woman with multiple vascular risk factors, including age and HTN was transferred from [facility] to [facility] for acute onset of right-sided weakness. She was noted to have developed acute onset of right-sided weakness and language disturbance. She was initially evaluated at [facility] through tele-stroke and was treated with IV tPA (Admission NIHSS 22). MRI brain showed left CYNDEE and MCA distribution infarct with mild hemorrhagic transformation (HI 1). 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    During hospital stay was neurologically without acute change: remains with global aphasia, right hemiplegia and bedbound due to functional quadriplegia. Permissive hypertension was slowly titrated to normotension. Course was complicated by urinary retention which was managed with PRN straight catheterization. MBS was performed and patient was placed on a Dysphagia 1 honey diet which has been well-tolerated. Lipitor discontinued due to admission LDL of 104. Family meeting to discuss goals of care done on 11/8 2pm, family decided code status DNR/DNI. Discussed and completed MOLST form. Briefly,  pt is DNR/DNI,  TRIAL OF NG TUBE IF BECOMES DYSPHASIC, IV FLUIDS AS NEEDED,  ANTIBIOTICS FOR INFECTION, RETURN TO HOSPITAL, FULL MEDICAL MANAGEMENT SHORT OF INTUBATION AND RESUSCITATION. Cardiac monitoring revealed no events. TTE shows EF 74% no evidence of thrombus or PFO.  Deemed medically stable for acute rehab. (15 Nov 2018 16:23)      PAST MEDICAL & SURGICAL HISTORY:  HTN (hypertension)  Hypertension  S/P appendectomy      MEDICATIONS  (STANDING):  aspirin  chewable 81 milliGRAM(s) Oral daily  docusate sodium 100 milliGRAM(s) Oral three times a day  enoxaparin Injectable 40 milliGRAM(s) SubCutaneous daily  FLUoxetine 20 milliGRAM(s) Oral daily  losartan 100 milliGRAM(s) Oral daily  senna 2 Tablet(s) Oral at bedtime    MEDICATIONS  (PRN):  acetaminophen   Tablet .. 650 milliGRAM(s) Oral every 6 hours PRN Temp greater or equal to 38C (100.4F), Mild Pain (1 - 3)      Allergies    No Known Allergies    Intolerances        TODAY'S SUBJECTIVE & REVIEW OF SYMPTOMS:  Patient was seen and examined today. There were no acute events overnight. NO nausea or vomiting. Patient is non-verbal.    PHYSICAL EXAM  Vital Signs Last 24 Hrs  T(C): 36.8 (16 Nov 2018 21:48), Max: 36.9 (16 Nov 2018 10:05)  T(F): 98.3 (16 Nov 2018 21:48), Max: 98.4 (16 Nov 2018 10:05)  HR: 66 (17 Nov 2018 05:50) (64 - 79)  BP: 124/60 (17 Nov 2018 05:50) (108/65 - 124/60)  BP(mean): --  RR: 14 (17 Nov 2018 05:50) (14 - 14)  SpO2: 94% (17 Nov 2018 05:50) (94% - 97%)    General: NAD  Communication- follows simple commands, unable to communicate "yes" or "no"  Resp: no respiratory distress. no cough. clear  Cardio: +S1, S2 HR 79 regular  Abdomen: +distended abdomen, +hypoactive bowel sounds  Extremities: no edema or calf tenderness  Motor: RUE flaccid, no subluxation; RLE increased flexor tone  Neuro: awake, alert  Skin: intact      RECENT LABS:                          11.7   4.5   )-----------( 249      ( 16 Nov 2018 05:15 )             35.4     11-16    140  |  105  |  19  ----------------------------<  94  4.1   |  27  |  0.81    Ca    9.8      16 Nov 2018 05:15    TPro  6.3  /  Alb  3.2<L>  /  TBili  0.4  /  DBili  x   /  AST  48<H>  /  ALT  50<H>  /  AlkPhos  85  11-16    LIVER FUNCTIONS - ( 16 Nov 2018 05:15 )  Alb: 3.2 g/dL / Pro: 6.3 g/dL / ALK PHOS: 85 U/L / ALT: 50 U/L DA / AST: 48 U/L / GGT: x

## 2018-11-18 PROCEDURE — 99232 SBSQ HOSP IP/OBS MODERATE 35: CPT

## 2018-11-18 RX ADMIN — Medication 81 MILLIGRAM(S): at 11:53

## 2018-11-18 RX ADMIN — Medication 100 MILLIGRAM(S): at 11:53

## 2018-11-18 RX ADMIN — Medication 650 MILLIGRAM(S): at 11:53

## 2018-11-18 RX ADMIN — Medication 500000 UNIT(S): at 06:04

## 2018-11-18 RX ADMIN — LOSARTAN POTASSIUM 100 MILLIGRAM(S): 100 TABLET, FILM COATED ORAL at 06:04

## 2018-11-18 RX ADMIN — Medication 100 MILLIGRAM(S): at 23:08

## 2018-11-18 RX ADMIN — Medication 20 MILLIGRAM(S): at 11:53

## 2018-11-18 RX ADMIN — POLYETHYLENE GLYCOL 3350 17 GRAM(S): 17 POWDER, FOR SOLUTION ORAL at 12:10

## 2018-11-18 RX ADMIN — Medication 100 MILLIGRAM(S): at 06:04

## 2018-11-18 RX ADMIN — Medication 5 MILLIGRAM(S): at 23:07

## 2018-11-18 RX ADMIN — Medication 500000 UNIT(S): at 23:08

## 2018-11-18 RX ADMIN — ENOXAPARIN SODIUM 40 MILLIGRAM(S): 100 INJECTION SUBCUTANEOUS at 11:53

## 2018-11-18 RX ADMIN — Medication 500000 UNIT(S): at 11:53

## 2018-11-18 RX ADMIN — Medication 650 MILLIGRAM(S): at 12:10

## 2018-11-18 RX ADMIN — SENNA PLUS 2 TABLET(S): 8.6 TABLET ORAL at 23:08

## 2018-11-18 NOTE — PROGRESS NOTE ADULT - ASSESSMENT
86 year-old woman with a PMH of HTN found to have left ICA (MCA + CYNDEE) stroke s/p tPA  with dense right HP, global aphasia, dysphagia, hemisensory deficits    # Left ICA stroke unknown etiology--Aspirin,   -Fluoxetine for motor recovery after stroke   -Continue OT, PT, SLP services    Cardiovascular- no PFO on ECHO, EF 74%, HTN  -Losartan      GI- failed MBS, some abdominal distenstion and slightly elevated LFTs  -Trend LFTs; will repeat mon 11/19  -Colace and Senna changed from PRN to standing orders  -bladder scans q12h, straight cath if retaining

## 2018-11-18 NOTE — PROGRESS NOTE ADULT - SUBJECTIVE AND OBJECTIVE BOX
86y old  Female who presents with a chief complaint of CVA, seen at the bedside, no new complaints per staff no n/v, no sob    Vital Signs Last 24 Hrs  T(C): 36.4 (18 Nov 2018 09:10), Max: 36.7 (17 Nov 2018 20:06)  T(F): 97.6 (18 Nov 2018 09:10), Max: 98 (17 Nov 2018 20:06)  HR: 85 (18 Nov 2018 09:10) (70 - 85)  BP: 140/64 (18 Nov 2018 09:10) (112/62 - 140/64)  BP(mean): --  RR: 14 (18 Nov 2018 09:10) (14 - 14)  SpO2: 93% (18 Nov 2018 09:10) (93% - 97%)    NAD, RUSS, MMM, NCAT  SUPPLE  CLEAR  S1S2  SOFT NT BS PRESENT  NO PEDAL EDEMA  aphasia  right hemiparesis    labs reviewed

## 2018-11-18 NOTE — PROGRESS NOTE ADULT - SUBJECTIVE AND OBJECTIVE BOX
Patient is a 86y old  Female who presents with a chief complaint of CVA (16 Nov 2018 08:09)      HPI:  86 year old woman with multiple vascular risk factors, including age and HTN was transferred from [facility] to [facility] for acute onset of right-sided weakness. She was noted to have developed acute onset of right-sided weakness and language disturbance. She was initially evaluated at [facility] through tele-stroke and was treated with IV tPA (Admission NIHSS 22). MRI brain showed left CYNDEE and MCA distribution infarct with mild hemorrhagic transformation (HI 1). 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    During hospital stay was neurologically without acute change: remains with global aphasia, right hemiplegia and bedbound due to functional quadriplegia. Permissive hypertension was slowly titrated to normotension. Course was complicated by urinary retention which was managed with PRN straight catheterization. MBS was performed and patient was placed on a Dysphagia 1 honey diet which has been well-tolerated. Lipitor discontinued due to admission LDL of 104. Family meeting to discuss goals of care done on 11/8 2pm, family decided code status DNR/DNI. Discussed and completed MOLST form. Briefly,  pt is DNR/DNI,  TRIAL OF NG TUBE IF BECOMES DYSPHASIC, IV FLUIDS AS NEEDED,  ANTIBIOTICS FOR INFECTION, RETURN TO HOSPITAL, FULL MEDICAL MANAGEMENT SHORT OF INTUBATION AND RESUSCITATION. Cardiac monitoring revealed no events. TTE shows EF 74% no evidence of thrombus or PFO.  Deemed medically stable for acute rehab. (15 Nov 2018 16:23)      PAST MEDICAL & SURGICAL HISTORY:  HTN (hypertension)  Hypertension  S/P appendectomy      MEDICATIONS  (STANDING):  aspirin  chewable 81 milliGRAM(s) Oral daily  docusate sodium 100 milliGRAM(s) Oral three times a day  enoxaparin Injectable 40 milliGRAM(s) SubCutaneous daily  FLUoxetine 20 milliGRAM(s) Oral daily  losartan 100 milliGRAM(s) Oral daily  senna 2 Tablet(s) Oral at bedtime    MEDICATIONS  (PRN):  acetaminophen   Tablet .. 650 milliGRAM(s) Oral every 6 hours PRN Temp greater or equal to 38C (100.4F), Mild Pain (1 - 3)      Allergies    No Known Allergies    Intolerances        TODAY'S SUBJECTIVE & REVIEW OF SYMPTOMS:  Patient was seen and examined today. There were no acute events overnight. NO nausea or vomiting. Patient is non-verbal.    PHYSICAL EXAM  Vital Signs Last 24 Hrs  T(C): 36.4 (18 Nov 2018 09:10), Max: 36.7 (17 Nov 2018 20:06)  T(F): 97.6 (18 Nov 2018 09:10), Max: 98 (17 Nov 2018 20:06)  HR: 85 (18 Nov 2018 09:10) (70 - 85)  BP: 140/64 (18 Nov 2018 09:10) (112/62 - 140/64)  BP(mean): --  RR: 14 (18 Nov 2018 09:10) (14 - 14)  SpO2: 93% (18 Nov 2018 09:10) (93% - 97%)    General: NAD  Communication- follows simple commands, unable to communicate "yes" or "no"  Resp: no respiratory distress. no cough. clear  Cardio: +S1, S2 HR 79 regular  Abdomen: +distended abdomen, +hypoactive bowel sounds  Extremities: no edema or calf tenderness  Motor: RUE flaccid, no subluxation; RLE increased flexor tone  Neuro: awake, alert  Skin: intact      RECENT LABS:                          11.7   4.5   )-----------( 249      ( 16 Nov 2018 05:15 )             35.4     11-16    140  |  105  |  19  ----------------------------<  94  4.1   |  27  |  0.81    Ca    9.8      16 Nov 2018 05:15    TPro  6.3  /  Alb  3.2<L>  /  TBili  0.4  /  DBili  x   /  AST  48<H>  /  ALT  50<H>  /  AlkPhos  85  11-16    LIVER FUNCTIONS - ( 16 Nov 2018 05:15 )  Alb: 3.2 g/dL / Pro: 6.3 g/dL / ALK PHOS: 85 U/L / ALT: 50 U/L DA / AST: 48 U/L / GGT: x

## 2018-11-18 NOTE — PROGRESS NOTE ADULT - ASSESSMENT
ASSESSMENT/PLAN  86 year-old woman with a PMH of HTN found to have left ICA (MCA + CYNDEE) stroke s/p tPA  with dense right HP, global aphasia, dysphagia, hemisensory deficits    # Left ICA stroke unknown etiology- with global aphasia and functional quadriplegia  -Aspirin, Statin discontinued as stroke not thought to be of atherosclerotic origin  -Fluoxetine for motor recovery after stroke   -Continue OT, PT, SLP services    Cardiovascular- no PFO on ECHO, EF 74%, HTN  -Losartan  -hospitalist     GI- failed MBS, some abdominal distenstion and slightly elevated LFTs  -Decrease Tylenol dose to 325mg prn  -Trend LFTs; will repeat mon 11/19  -Colace and Senna changed from PRN to standing orders  -bladder scans q12h, straight cath if retaining  -Pureed with honey thickened liquids    #Code Status: As per meeting with Palliative Team at [facility]  pt is DNR/DNI,  TRIAL OF NG TUBE IF BECOMES DYSPHASIC, IV FLUIDS AS NEEDED,  ANTIBIOTICS FOR INFECTION, RETURN TO HOSPITAL, FULL MEDICAL MANAGEMENT SHORT OF INTUBATION AND RESUSCITATION  -f/u MOLST form    LABS:  CBC BMP, LFT's 11/19 Monday

## 2018-11-19 LAB
ALBUMIN SERPL ELPH-MCNC: 3.2 G/DL — LOW (ref 3.3–5)
ALP SERPL-CCNC: 80 U/L — SIGNIFICANT CHANGE UP (ref 40–120)
ALT FLD-CCNC: 51 U/L DA — HIGH (ref 10–45)
ANION GAP SERPL CALC-SCNC: 5 MMOL/L — SIGNIFICANT CHANGE UP (ref 5–17)
AST SERPL-CCNC: 42 U/L — HIGH (ref 10–40)
BILIRUB SERPL-MCNC: 0.4 MG/DL — SIGNIFICANT CHANGE UP (ref 0.2–1.2)
BUN SERPL-MCNC: 21 MG/DL — SIGNIFICANT CHANGE UP (ref 7–23)
CALCIUM SERPL-MCNC: 9.7 MG/DL — SIGNIFICANT CHANGE UP (ref 8.4–10.5)
CHLORIDE SERPL-SCNC: 106 MMOL/L — SIGNIFICANT CHANGE UP (ref 96–108)
CO2 SERPL-SCNC: 27 MMOL/L — SIGNIFICANT CHANGE UP (ref 22–31)
CREAT SERPL-MCNC: 0.82 MG/DL — SIGNIFICANT CHANGE UP (ref 0.5–1.3)
GLUCOSE SERPL-MCNC: 110 MG/DL — HIGH (ref 70–99)
HCT VFR BLD CALC: 34.5 % — SIGNIFICANT CHANGE UP (ref 34.5–45)
HGB BLD-MCNC: 11.6 G/DL — SIGNIFICANT CHANGE UP (ref 11.5–15.5)
MCHC RBC-ENTMCNC: 33.8 GM/DL — SIGNIFICANT CHANGE UP (ref 32–36)
MCHC RBC-ENTMCNC: 34.1 PG — HIGH (ref 27–34)
MCV RBC AUTO: 100.9 FL — HIGH (ref 80–100)
PLATELET # BLD AUTO: 250 K/UL — SIGNIFICANT CHANGE UP (ref 150–400)
POTASSIUM SERPL-MCNC: 4 MMOL/L — SIGNIFICANT CHANGE UP (ref 3.5–5.3)
POTASSIUM SERPL-SCNC: 4 MMOL/L — SIGNIFICANT CHANGE UP (ref 3.5–5.3)
PROT SERPL-MCNC: 6.4 G/DL — SIGNIFICANT CHANGE UP (ref 6–8.3)
RBC # BLD: 3.42 M/UL — LOW (ref 3.8–5.2)
RBC # FLD: 11.9 % — SIGNIFICANT CHANGE UP (ref 10.3–14.5)
SODIUM SERPL-SCNC: 138 MMOL/L — SIGNIFICANT CHANGE UP (ref 135–145)
WBC # BLD: 4.9 K/UL — SIGNIFICANT CHANGE UP (ref 3.8–10.5)
WBC # FLD AUTO: 4.9 K/UL — SIGNIFICANT CHANGE UP (ref 3.8–10.5)

## 2018-11-19 PROCEDURE — 99232 SBSQ HOSP IP/OBS MODERATE 35: CPT | Mod: GC

## 2018-11-19 PROCEDURE — 99232 SBSQ HOSP IP/OBS MODERATE 35: CPT

## 2018-11-19 RX ADMIN — Medication 500000 UNIT(S): at 07:00

## 2018-11-19 RX ADMIN — Medication 100 MILLIGRAM(S): at 21:36

## 2018-11-19 RX ADMIN — Medication 500000 UNIT(S): at 12:54

## 2018-11-19 RX ADMIN — POLYETHYLENE GLYCOL 3350 17 GRAM(S): 17 POWDER, FOR SOLUTION ORAL at 12:54

## 2018-11-19 RX ADMIN — Medication 20 MILLIGRAM(S): at 12:52

## 2018-11-19 RX ADMIN — Medication 81 MILLIGRAM(S): at 12:53

## 2018-11-19 RX ADMIN — Medication 5 MILLIGRAM(S): at 21:36

## 2018-11-19 RX ADMIN — ENOXAPARIN SODIUM 40 MILLIGRAM(S): 100 INJECTION SUBCUTANEOUS at 12:52

## 2018-11-19 RX ADMIN — SENNA PLUS 2 TABLET(S): 8.6 TABLET ORAL at 21:36

## 2018-11-19 RX ADMIN — Medication 100 MILLIGRAM(S): at 12:54

## 2018-11-19 RX ADMIN — Medication 500000 UNIT(S): at 21:36

## 2018-11-19 NOTE — PROGRESS NOTE ADULT - ASSESSMENT
ASSESSMENT/PLAN  86 year-old woman with a PMH of HTN found to have left ICA (MCA + CYNDEE) stroke s/p tPA  with dense right HP, global aphasia, dysphagia, hemisensory deficits    # Left ICA stroke unknown etiology- with global aphasia and functional quadriplegia  -Aspirin, Statin discontinued as stroke not thought to be of atherosclerotic origin  -Fluoxetine for motor recovery after stroke   -Continue OT, PT, SLP services    Cardiovascular- no PFO on ECHO, EF 74%, HTN  -Losartan  -hospitalist     GI- failed MBS, some abdominal distenstion and slightly elevated LFTs  -Decrease Tylenol dose to 325mg prn  -Trend LFTs-stable   -Colace and Senna changed from PRN to standing orders  -bladder scans q12h, straight cath if retaining (PVRs generally under 200cc)  -Pureed with honey thickened liquids  -LBM 11/17    #Code Status: As per meeting with Palliative Team at [facility]  pt is DNR/DNI,  TRIAL OF NG TUBE IF BECOMES DYSPHASIC, IV FLUIDS AS NEEDED,  ANTIBIOTICS FOR INFECTION, RETURN TO HOSPITAL, FULL MEDICAL MANAGEMENT SHORT OF INTUBATION AND RESUSCITATION  -f/u MOLST form ASSESSMENT/PLAN  86 year-old woman with a PMH of HTN found to have left ICA (MCA + CYNDEE) stroke s/p tPA  with dense right HP, global aphasia, dysphagia, hemisensory deficits    # Left ICA stroke unknown etiology- with global aphasia and functional quadriplegia  -Aspirin, Statin discontinued as stroke not thought to be of atherosclerotic origin  -Fluoxetine for motor recovery after stroke . tolerating, some RLE movement elicited over weekend  -Continue OT, PT, SLP services    Cardiovascular- no PFO on ECHO, EF 74%, HTN  -Losartan. Bp controlled 120/62 11/19      GI- failed MBS, some abdominal distenstion and slightly elevated LFTs  -Decrease Tylenol dose to 325mg prn  -Trend LFTs-stable   -Colace and Senna changed from PRN to standing orders  -bladder scans q12h, straight cath if retaining (PVRs generally under 200cc)  -Pureed with honey thickened liquids  -LBM 11/17    #Code Status: As per meeting with Palliative Team at [facility]  pt is DNR/DNI,  TRIAL OF NG TUBE IF BECOMES DYSPHASIC, IV FLUIDS AS NEEDED,  ANTIBIOTICS FOR INFECTION, RETURN TO HOSPITAL, FULL MEDICAL MANAGEMENT SHORT OF INTUBATION AND RESUSCITATION  -f/u MOLST form      # For IDT rounds and dc planning tomorrow

## 2018-11-19 NOTE — PROGRESS NOTE ADULT - SUBJECTIVE AND OBJECTIVE BOX
Patient is a 86y old  Female who presents with a chief complaint of CVA (16 Nov 2018 08:09)      HPI:  86 year old woman with multiple vascular risk factors, including age and HTN was transferred from [facility] to [facility] for acute onset of right-sided weakness. She was noted to have developed acute onset of right-sided weakness and language disturbance. She was initially evaluated at [facility] through tele-stroke and was treated with IV tPA (Admission NIHSS 22). MRI brain showed left CYNDEE and MCA distribution infarct with mild hemorrhagic transformation (HI 1). 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    During hospital stay was neurologically without acute change: remains with global aphasia, right hemiplegia and bedbound due to functional quadriplegia. Permissive hypertension was slowly titrated to normotension. Course was complicated by urinary retention which was managed with PRN straight catheterization. MBS was performed and patient was placed on a Dysphagia 1 honey diet which has been well-tolerated. Lipitor discontinued due to admission LDL of 104. Family meeting to discuss goals of care done on 11/8 2pm, family decided code status DNR/DNI. Discussed and completed MOLST form. Briefly,  pt is DNR/DNI,  TRIAL OF NG TUBE IF BECOMES DYSPHASIC, IV FLUIDS AS NEEDED,  ANTIBIOTICS FOR INFECTION, RETURN TO HOSPITAL, FULL MEDICAL MANAGEMENT SHORT OF INTUBATION AND RESUSCITATION. Cardiac monitoring revealed no events. TTE shows EF 74% no evidence of thrombus or PFO.  Deemed medically stable for acute rehab. (15 Nov 2018 16:23)      PAST MEDICAL & SURGICAL HISTORY:  HTN (hypertension)  Hypertension  S/P appendectomy    Allergies    No Known Allergies    Intolerances        TODAY'S SUBJECTIVE & REVIEW OF SYMPTOMS:  Patient was seen and examined today. Son at bedside. There were no acute events overnight. NO nausea or vomiting. Patient is non-verbal, but able to use hand gestures to communicate with some more accuracy (per ST).     PHYSICAL EXAM  Vital Signs Last 24 Hrs  T(C): 37.2 (19 Nov 2018 07:16), Max: 37.2 (19 Nov 2018 07:16)  T(F): 98.9 (19 Nov 2018 07:16), Max: 98.9 (19 Nov 2018 07:16)  HR: 67 (19 Nov 2018 07:16) (67 - 84)  BP: 120/62 (19 Nov 2018 07:16) (109/62 - 135/62)  RR: 14 (19 Nov 2018 07:16) (14 - 16)  SpO2: 93% (19 Nov 2018 07:16) (93% - 96%)    General: NAD  Communication- follows simple commands, unable to communicate "yes" or "no"  Resp: no respiratory distress. no cough. clear  Cardio: +S1, S2, regular  Abdomen: +BS, soft   Extremities: no edema or calf tenderness  Motor: RUE flaccid, no subluxation; RLE increased flexor tone  Neuro: awake, alert  Skin: intact      RECENT LABS:           11-19    138  |  106  |  21  ----------------------------<  110<H>  4.0   |  27  |  0.82    Ca    9.7      19 Nov 2018 06:15    TPro  6.4  /  Alb  3.2<L>  /  TBili  0.4  /  DBili  x   /  AST  42<H>  /  ALT  51<H>  /  AlkPhos  80  11-19                        11.6   4.9   )-----------( 250      ( 19 Nov 2018 06:15 )             34.5       MEDICATIONS  (STANDING):  aspirin  chewable 81 milliGRAM(s) Oral daily  bisacodyl 5 milliGRAM(s) Oral at bedtime  docusate sodium 100 milliGRAM(s) Oral three times a day  enoxaparin Injectable 40 milliGRAM(s) SubCutaneous daily  FLUoxetine 20 milliGRAM(s) Oral daily  losartan 100 milliGRAM(s) Oral daily  nystatin    Suspension 643283 Unit(s) Oral three times a day  polyethylene glycol 3350 17 Gram(s) Oral daily  senna 2 Tablet(s) Oral at bedtime    MEDICATIONS  (PRN):  acetaminophen   Tablet .. 650 milliGRAM(s) Oral every 6 hours PRN Temp greater or equal to 38C (100.4F), Mild Pain (1 - 3)  bisacodyl Suppository 10 milliGRAM(s) Rectal daily PRN Constipation Patient is a 86y old  Female who presents with a chief complaint of CVA (16 Nov 2018 08:09)      HPI:  86 year old woman with multiple vascular risk factors, including age and HTN was transferred from [facility] to [facility] for acute onset of right-sided weakness. She was noted to have developed acute onset of right-sided weakness and language disturbance. She was initially evaluated at [facility] through tele-stroke and was treated with IV tPA (Admission NIHSS 22). MRI brain showed left CYNDEE and MCA distribution infarct with mild hemorrhagic transformation (HI 1). 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    During hospital stay was neurologically without acute change: remains with global aphasia, right hemiplegia and bedbound due to functional quadriplegia. Permissive hypertension was slowly titrated to normotension. Course was complicated by urinary retention which was managed with PRN straight catheterization. MBS was performed and patient was placed on a Dysphagia 1 honey diet which has been well-tolerated. Lipitor discontinued due to admission LDL of 104. Family meeting to discuss goals of care done on 11/8 2pm, family decided code status DNR/DNI. Discussed and completed MOLST form. Briefly,  pt is DNR/DNI,  TRIAL OF NG TUBE IF BECOMES DYSPHASIC, IV FLUIDS AS NEEDED,  ANTIBIOTICS FOR INFECTION, RETURN TO HOSPITAL, FULL MEDICAL MANAGEMENT SHORT OF INTUBATION AND RESUSCITATION. Cardiac monitoring revealed no events. TTE shows EF 74% no evidence of thrombus or PFO.  Deemed medically stable for acute rehab. (15 Nov 2018 16:23)      PAST MEDICAL & SURGICAL HISTORY:  HTN (hypertension)  Hypertension  S/P appendectomy    Allergies    No Known Allergies    Intolerances        TODAY'S SUBJECTIVE & REVIEW OF SYMPTOMS:  Patient was seen and examined today. Son at bedside. There were no acute events overnight. NO nausea or vomiting. Patient is non-verbal, but able to use hand gestures to communicate with some more accuracy (per ST).   Seen with family at bedside. Family feels patient is comfortable, slept well, no H/A. abdominal pain also improved/resolved. Looks muchmore alert in general    PHYSICAL EXAM  Vital Signs Last 24 Hrs  T(C): 37.2 (19 Nov 2018 07:16), Max: 37.2 (19 Nov 2018 07:16)  T(F): 98.9 (19 Nov 2018 07:16), Max: 98.9 (19 Nov 2018 07:16)  HR: 67 (19 Nov 2018 07:16) (67 - 84)  BP: 120/62 (19 Nov 2018 07:16) (109/62 - 135/62)  RR: 14 (19 Nov 2018 07:16) (14 - 16)  SpO2: 93% (19 Nov 2018 07:16) (93% - 96%)    General: NAD  Communication- follows simple commands, unable to communicate "yes" or "no"  Resp: no respiratory distress. no cough. clear  Cardio: +S1, S2, regular  Abdomen: +BS, soft   Extremities: no edema or calf tenderness  Motor: RUE flaccid, no subluxation; RLE increased flexor tone  *Famly noted some RLE MOVEMENT over weekend, reflexive  Neuro: awake, alert  Skin: intact      RECENT LABS:           11-19    138  |  106  |  21  ----------------------------<  110<H>  4.0   |  27  |  0.82    Ca    9.7      19 Nov 2018 06:15    TPro  6.4  /  Alb  3.2<L>  /  TBili  0.4  /  DBili  x   /  AST  42<H>  /  ALT  51<H>  /  AlkPhos  80  11-19                        11.6   4.9   )-----------( 250      ( 19 Nov 2018 06:15 )             34.5       MEDICATIONS  (STANDING):  aspirin  chewable 81 milliGRAM(s) Oral daily  bisacodyl 5 milliGRAM(s) Oral at bedtime  docusate sodium 100 milliGRAM(s) Oral three times a day  enoxaparin Injectable 40 milliGRAM(s) SubCutaneous daily  FLUoxetine 20 milliGRAM(s) Oral daily  losartan 100 milliGRAM(s) Oral daily  nystatin    Suspension 422503 Unit(s) Oral three times a day  polyethylene glycol 3350 17 Gram(s) Oral daily  senna 2 Tablet(s) Oral at bedtime    MEDICATIONS  (PRN):  acetaminophen   Tablet .. 650 milliGRAM(s) Oral every 6 hours PRN Temp greater or equal to 38C (100.4F), Mild Pain (1 - 3)  bisacodyl Suppository 10 milliGRAM(s) Rectal daily PRN Constipation

## 2018-11-19 NOTE — PROGRESS NOTE ADULT - SUBJECTIVE AND OBJECTIVE BOX
HPI  Pt is a 85yo F admitted to acute rehab for left ICA CVA  Pt was seen and examined in wheelchair with son by the side. Denies of any acute event. Non verbal but able to9 use gestures. Hemodynamically stable.     Vital Signs Last 24 Hrs  T(C): 37.2 (19 Nov 2018 07:16), Max: 37.2 (19 Nov 2018 07:16)  T(F): 98.9 (19 Nov 2018 07:16), Max: 98.9 (19 Nov 2018 07:16)  HR: 67 (19 Nov 2018 07:16) (67 - 84)  BP: 120/62 (19 Nov 2018 07:16) (109/62 - 135/62)  BP(mean): --  RR: 14 (19 Nov 2018 07:16) (14 - 16)  SpO2: 93% (19 Nov 2018 07:16) (93% - 96%)    I&O's Summary      CAPILLARY BLOOD GLUCOSE      PHYSICAL EXAM:    Constitutional: NAD, awake and alert,  Neck: Soft   Respiratory: Breath sounds are clear bilaterally,  Cardiovascular: S1 and S2,   Gastrointestinal: Bowel Sounds present, soft,   Extremities: No peripheral edema  Neurological: aphasic, non verbal  Musculoskeletal: RUE flasid. RLE move minimal per son   Skin: No rashes    MEDICATIONS:  MEDICATIONS  (STANDING):  aspirin  chewable 81 milliGRAM(s) Oral daily  bisacodyl 5 milliGRAM(s) Oral at bedtime  docusate sodium 100 milliGRAM(s) Oral three times a day  enoxaparin Injectable 40 milliGRAM(s) SubCutaneous daily  FLUoxetine 20 milliGRAM(s) Oral daily  losartan 100 milliGRAM(s) Oral daily  nystatin    Suspension 213627 Unit(s) Oral three times a day  polyethylene glycol 3350 17 Gram(s) Oral daily  senna 2 Tablet(s) Oral at bedtime      LABS: All Labs Reviewed:                        11.6   4.9   )-----------( 250      ( 19 Nov 2018 06:15 )             34.5     11-19    138  |  106  |  21  ----------------------------<  110<H>  4.0   |  27  |  0.82    Ca    9.7      19 Nov 2018 06:15    TPro  6.4  /  Alb  3.2<L>  /  TBili  0.4  /  DBili  x   /  AST  42<H>  /  ALT  51<H>  /  AlkPhos  80  11-19          Blood Culture:     RADIOLOGY/EKG:    DVT PPX:    ADVANCED DIRECTIVE:    DISPOSITION:

## 2018-11-19 NOTE — PROGRESS NOTE ADULT - ASSESSMENT
HPI  Pt is a 87yo F admitted to acute rehab for left ICA CVA    *Left ICA CVA   Admit to acute rehab  Cont PT/OT/SLP   Cont asa  Fluoxetine for motor improvement     *HTN  Controlled  Cont Losartan     *dysphagia with aphasia  Pureed w honey thickened liquid  SLP    *DVT ppx   Lovenox

## 2018-11-20 LAB
APPEARANCE UR: CLEAR — SIGNIFICANT CHANGE UP
BACTERIA # UR AUTO: ABNORMAL /HPF
BILIRUB UR-MCNC: NEGATIVE — SIGNIFICANT CHANGE UP
COLOR SPEC: YELLOW — SIGNIFICANT CHANGE UP
DIFF PNL FLD: ABNORMAL
EPI CELLS # UR: SIGNIFICANT CHANGE UP
GLUCOSE UR QL: NEGATIVE — SIGNIFICANT CHANGE UP
KETONES UR-MCNC: NEGATIVE — SIGNIFICANT CHANGE UP
LEUKOCYTE ESTERASE UR-ACNC: ABNORMAL
NITRITE UR-MCNC: POSITIVE
PH UR: 6.5 — SIGNIFICANT CHANGE UP (ref 5–8)
PROT UR-MCNC: 30 MG/DL
RBC CASTS # UR COMP ASSIST: SIGNIFICANT CHANGE UP /HPF (ref 0–4)
SP GR SPEC: 1.01 — SIGNIFICANT CHANGE UP (ref 1.01–1.02)
UROBILINOGEN FLD QL: NEGATIVE — SIGNIFICANT CHANGE UP
WBC UR QL: ABNORMAL /HPF (ref 0–5)

## 2018-11-20 PROCEDURE — 99232 SBSQ HOSP IP/OBS MODERATE 35: CPT | Mod: GC

## 2018-11-20 PROCEDURE — 99232 SBSQ HOSP IP/OBS MODERATE 35: CPT

## 2018-11-20 RX ADMIN — Medication 100 MILLIGRAM(S): at 13:44

## 2018-11-20 RX ADMIN — Medication 500000 UNIT(S): at 05:29

## 2018-11-20 RX ADMIN — SENNA PLUS 2 TABLET(S): 8.6 TABLET ORAL at 22:22

## 2018-11-20 RX ADMIN — Medication 500000 UNIT(S): at 22:22

## 2018-11-20 RX ADMIN — Medication 81 MILLIGRAM(S): at 17:47

## 2018-11-20 RX ADMIN — LOSARTAN POTASSIUM 100 MILLIGRAM(S): 100 TABLET, FILM COATED ORAL at 05:29

## 2018-11-20 RX ADMIN — ENOXAPARIN SODIUM 40 MILLIGRAM(S): 100 INJECTION SUBCUTANEOUS at 13:44

## 2018-11-20 RX ADMIN — POLYETHYLENE GLYCOL 3350 17 GRAM(S): 17 POWDER, FOR SOLUTION ORAL at 13:44

## 2018-11-20 RX ADMIN — Medication 100 MILLIGRAM(S): at 05:29

## 2018-11-20 RX ADMIN — Medication 500000 UNIT(S): at 13:44

## 2018-11-20 RX ADMIN — Medication 100 MILLIGRAM(S): at 22:22

## 2018-11-20 RX ADMIN — Medication 5 MILLIGRAM(S): at 22:22

## 2018-11-20 RX ADMIN — Medication 20 MILLIGRAM(S): at 13:45

## 2018-11-20 NOTE — PROGRESS NOTE ADULT - SUBJECTIVE AND OBJECTIVE BOX
Patient is a 86y old  Female who presents with a chief complaint of CVA right HP , aphasia (20 Nov 2018 10:54)      HPI:  86 year old woman RH dominant with multiple vascular risk factors, including age and HTN was transferred from [facility] to [facility] for acute onset of right-sided weakness. She was noted to have developed acute onset of right-sided weakness and language disturbance. She was initially evaluated at [facility] through tele-stroke and was treated with IV tPA (Admission NIHSS 22). MRI brain showed left CYNDEE and MCA distribution infarct with mild hemorrhagic transformation (HI 1). 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    During hospital stay was neurologically without acute change: remains with global aphasia, right hemiplegia . Permissive hypertension was slowly titrated to normotension. Course was complicated by urinary retention which was managed with PRN straight catheterization. MBS was performed and patient was placed on a Dysphagia 1 honey diet which has been well-tolerated. Lipitor discontinued due to admission LDL of 104. Family meeting to discuss goals of care done on 11/8 2pm, family decided code status DNR/DNI. Discussed and completed MOLST form. Briefly,  pt is DNR/DNI,  TRIAL OF NG TUBE IF BECOMES DYSPHASIC, IV FLUIDS AS NEEDED,  ANTIBIOTICS FOR INFECTION, RETURN TO HOSPITAL, FULL MEDICAL MANAGEMENT SHORT OF INTUBATION AND RESUSCITATION. Cardiac monitoring revealed no events. TTE shows EF 74% no evidence of thrombus or PFO.  Deemed medically stable for acute rehab. (15 Nov 2018 16:23)      PAST MEDICAL & SURGICAL HISTORY:  HTN (hypertension)  Hypertension  S/P appendectomy      MEDICATIONS  (STANDING):  aspirin  chewable 81 milliGRAM(s) Oral daily  bisacodyl 5 milliGRAM(s) Oral at bedtime  docusate sodium 100 milliGRAM(s) Oral three times a day  enoxaparin Injectable 40 milliGRAM(s) SubCutaneous daily  FLUoxetine 20 milliGRAM(s) Oral daily  losartan 100 milliGRAM(s) Oral daily  nystatin    Suspension 893382 Unit(s) Oral three times a day  polyethylene glycol 3350 17 Gram(s) Oral daily  senna 2 Tablet(s) Oral at bedtime    MEDICATIONS  (PRN):  acetaminophen   Tablet .. 650 milliGRAM(s) Oral every 6 hours PRN Temp greater or equal to 38C (100.4F), Mild Pain (1 - 3)  bisacodyl Suppository 10 milliGRAM(s) Rectal daily PRN Constipation      Allergies    No Known Allergies    Intolerances        TODAY'S SUBJECTIVE & REVIEW OF SYMPTOMS:    Patient stable, seen with family and language line  in [place]  [employer] #[id number]. Patient uses hand gestures, fair reliability but not always consistent, even with repetition. Denies H /A, dizziness, Bowel complaints. Vague ? dysuria and lower abdominal complaints, not soncistsent    PHYSICAL EXAM  86y  Vital Signs Last 24 Hrs  T(C): 36.3 (20 Nov 2018 08:04), Max: 36.7 (19 Nov 2018 21:09)  T(F): 97.4 (20 Nov 2018 08:04), Max: 98 (19 Nov 2018 21:09)  HR: 76 (20 Nov 2018 08:04) (56 - 76)  BP: 160/66 (20 Nov 2018 08:04) (128/57 - 160/66)  BP(mean): --  RR: 14 (20 Nov 2018 08:04) (14 - 14)  SpO2: 96% (20 Nov 2018 08:04) (96% - 98%)  Daily     Daily     General: alert eyes open spontaneously. Attempts to vocalize but no verbalization. can follow simple commands  +global aphasia    Resp: clear bilaterally no R/r/W  Cardio: RRR HR 76  Abdomen: +BS soft, NT ?suprapubic discomfort no R/G  Extremities:calves sosft, NT  right UE hypotonic/flaccid  RLE 1/5 with plantar stimulation    Skin: intact      RECENT LABS:                          11.6   4.9   )-----------( 250      ( 19 Nov 2018 06:15 )             34.5     11-19    138  |  106  |  21  ----------------------------<  110<H>  4.0   |  27  |  0.82    Ca    9.7      19 Nov 2018 06:15    TPro  6.4  /  Alb  3.2<L>  /  TBili  0.4  /  DBili  x   /  AST  42<H>  /  ALT  51<H>  /  AlkPhos  80  11-19    LIVER FUNCTIONS - ( 19 Nov 2018 06:15 )  Alb: 3.2 g/dL / Pro: 6.4 g/dL / ALK PHOS: 80 U/L / ALT: 51 U/L DA / AST: 42 U/L / GGT: x                   CAPILLARY BLOOD GLUCOSE        IMPRESSION AND PLAN:

## 2018-11-20 NOTE — PROGRESS NOTE ADULT - SUBJECTIVE AND OBJECTIVE BOX
HPI  Pt is a 85yo F admitted to acute rehab for left ICA CVA  Pt was seen and examined in the wheelchair during OT. Pt is able to make sound and use hand gesture for yes or no.     Vital Signs Last 24 Hrs  T(C): 36.3 (20 Nov 2018 08:04), Max: 36.7 (19 Nov 2018 21:09)  T(F): 97.4 (20 Nov 2018 08:04), Max: 98 (19 Nov 2018 21:09)  HR: 76 (20 Nov 2018 08:04) (56 - 76)  BP: 160/66 (20 Nov 2018 08:04) (128/57 - 160/66)  BP(mean): --  RR: 14 (20 Nov 2018 08:04) (14 - 14)  SpO2: 96% (20 Nov 2018 08:04) (96% - 98%)    I&O's Summary      CAPILLARY BLOOD GLUCOSE    PHYSICAL EXAM:    Constitutional: NAD, awake and alert,  Neck: Soft   Respiratory: Breath sounds are clear bilaterally,  Cardiovascular: S1 and S2,   Gastrointestinal: Bowel Sounds present, soft,   Extremities: No peripheral edema  Neurological: aphasic, non verbal  Musculoskeletal: RUE flasid. RLE move minimal per son   Skin: No rashes    MEDICATIONS:  MEDICATIONS  (STANDING):  aspirin  chewable 81 milliGRAM(s) Oral daily  bisacodyl 5 milliGRAM(s) Oral at bedtime  docusate sodium 100 milliGRAM(s) Oral three times a day  enoxaparin Injectable 40 milliGRAM(s) SubCutaneous daily  FLUoxetine 20 milliGRAM(s) Oral daily  losartan 100 milliGRAM(s) Oral daily  nystatin    Suspension 499134 Unit(s) Oral three times a day  polyethylene glycol 3350 17 Gram(s) Oral daily  senna 2 Tablet(s) Oral at bedtime      LABS: All Labs Reviewed:                        11.6   4.9   )-----------( 250      ( 19 Nov 2018 06:15 )             34.5     11-19    138  |  106  |  21  ----------------------------<  110<H>  4.0   |  27  |  0.82    Ca    9.7      19 Nov 2018 06:15    TPro  6.4  /  Alb  3.2<L>  /  TBili  0.4  /  DBili  x   /  AST  42<H>  /  ALT  51<H>  /  AlkPhos  80  11-19          Blood Culture:     RADIOLOGY/EKG:    DVT PPX:    ADVANCED DIRECTIVE:    DISPOSITION:

## 2018-11-20 NOTE — PROGRESS NOTE ADULT - ASSESSMENT
ASSESSMENT/PLAN  86 year-old woman with a PMH of HTN found to have left ICA (MCA + CYNDEE) stroke s/p tPA  with dense right HP, global aphasia, dysphagia, hemisensory deficits    # Left ICA stroke unknown etiology- with global aphasia and functional quadriplegia  -Aspirin, Statin discontinued as stroke not thought to be of atherosclerotic origin  -Fluoxetine for motor recovery after stroke . tolerating, some RLE movement elicited over weekend. Per famly, patient was on SSRI as well prior to CVA  -Continue OT, PT, SLP services    #Cardiovascular- no PFO on ECHO, EF 74%, HTN  -Losartan. Bp controlled    #GI-  -Decrease Tylenol dose to 325mg prn. Trend LFTs-stable   -Colace and Senna changed from PRN to standing orders  -bladder scans q12h, straight cath if retaining (PVRs generally under 200cc)    -# DIET  upgarded to mechanical soft and honey thickened liquids    #Code Status: As per meeting with Palliative Team at [facility]  pt is DNR/DNI,  TRIAL OF NG TUBE IF BECOMES DYSPHASIC, IV FLUIDS AS NEEDED,  ANTIBIOTICS FOR INFECTION, RETURN TO HOSPITAL, FULL MEDICAL MANAGEMENT SHORT OF INTUBATION AND RESUSCITATION  -f/u MOLST form      # For IDT rounds   patient motivated, family involved and will be supportive on dc. Goals for min-mod assist, likely wheelchair level on dc, needs 24 hours assistance and supervision due to plegia and aphasia. caregiver training, target dc home 12/6/18 with home PT OT, SLP referral    #r/o UTI ? dysuria  -send UA    LABS: UA  CBC BMP 11/21

## 2018-11-21 LAB
ANION GAP SERPL CALC-SCNC: 7 MMOL/L — SIGNIFICANT CHANGE UP (ref 5–17)
BUN SERPL-MCNC: 14 MG/DL — SIGNIFICANT CHANGE UP (ref 7–23)
CALCIUM SERPL-MCNC: 10.3 MG/DL — SIGNIFICANT CHANGE UP (ref 8.4–10.5)
CHLORIDE SERPL-SCNC: 104 MMOL/L — SIGNIFICANT CHANGE UP (ref 96–108)
CO2 SERPL-SCNC: 28 MMOL/L — SIGNIFICANT CHANGE UP (ref 22–31)
CREAT SERPL-MCNC: 0.87 MG/DL — SIGNIFICANT CHANGE UP (ref 0.5–1.3)
GLUCOSE SERPL-MCNC: 113 MG/DL — HIGH (ref 70–99)
HCT VFR BLD CALC: 36.3 % — SIGNIFICANT CHANGE UP (ref 34.5–45)
HGB BLD-MCNC: 12.2 G/DL — SIGNIFICANT CHANGE UP (ref 11.5–15.5)
MCHC RBC-ENTMCNC: 33.6 GM/DL — SIGNIFICANT CHANGE UP (ref 32–36)
MCHC RBC-ENTMCNC: 33.8 PG — SIGNIFICANT CHANGE UP (ref 27–34)
MCV RBC AUTO: 100.6 FL — HIGH (ref 80–100)
PLATELET # BLD AUTO: 276 K/UL — SIGNIFICANT CHANGE UP (ref 150–400)
POTASSIUM SERPL-MCNC: 4.4 MMOL/L — SIGNIFICANT CHANGE UP (ref 3.5–5.3)
POTASSIUM SERPL-SCNC: 4.4 MMOL/L — SIGNIFICANT CHANGE UP (ref 3.5–5.3)
RBC # BLD: 3.6 M/UL — LOW (ref 3.8–5.2)
RBC # FLD: 11.8 % — SIGNIFICANT CHANGE UP (ref 10.3–14.5)
SODIUM SERPL-SCNC: 139 MMOL/L — SIGNIFICANT CHANGE UP (ref 135–145)
WBC # BLD: 5.4 K/UL — SIGNIFICANT CHANGE UP (ref 3.8–10.5)
WBC # FLD AUTO: 5.4 K/UL — SIGNIFICANT CHANGE UP (ref 3.8–10.5)

## 2018-11-21 PROCEDURE — 99232 SBSQ HOSP IP/OBS MODERATE 35: CPT

## 2018-11-21 RX ORDER — NITROFURANTOIN MACROCRYSTAL 50 MG
100 CAPSULE ORAL
Qty: 0 | Refills: 0 | Status: DISCONTINUED | OUTPATIENT
Start: 2018-11-21 | End: 2018-11-23

## 2018-11-21 RX ORDER — SACCHAROMYCES BOULARDII 250 MG
250 POWDER IN PACKET (EA) ORAL
Qty: 0 | Refills: 0 | Status: DISCONTINUED | OUTPATIENT
Start: 2018-11-21 | End: 2018-12-11

## 2018-11-21 RX ADMIN — ENOXAPARIN SODIUM 40 MILLIGRAM(S): 100 INJECTION SUBCUTANEOUS at 13:14

## 2018-11-21 RX ADMIN — Medication 500000 UNIT(S): at 05:51

## 2018-11-21 RX ADMIN — Medication 20 MILLIGRAM(S): at 13:14

## 2018-11-21 RX ADMIN — LOSARTAN POTASSIUM 100 MILLIGRAM(S): 100 TABLET, FILM COATED ORAL at 05:51

## 2018-11-21 RX ADMIN — Medication 100 MILLIGRAM(S): at 05:51

## 2018-11-21 RX ADMIN — Medication 5 MILLIGRAM(S): at 22:47

## 2018-11-21 RX ADMIN — Medication 100 MILLIGRAM(S): at 17:42

## 2018-11-21 RX ADMIN — Medication 500000 UNIT(S): at 22:48

## 2018-11-21 RX ADMIN — Medication 500000 UNIT(S): at 13:14

## 2018-11-21 RX ADMIN — Medication 81 MILLIGRAM(S): at 13:14

## 2018-11-21 RX ADMIN — Medication 250 MILLIGRAM(S): at 17:41

## 2018-11-21 RX ADMIN — Medication 100 MILLIGRAM(S): at 22:48

## 2018-11-21 RX ADMIN — POLYETHYLENE GLYCOL 3350 17 GRAM(S): 17 POWDER, FOR SOLUTION ORAL at 13:14

## 2018-11-21 RX ADMIN — SENNA PLUS 2 TABLET(S): 8.6 TABLET ORAL at 22:48

## 2018-11-21 NOTE — PROGRESS NOTE ADULT - SUBJECTIVE AND OBJECTIVE BOX
Patient is a 86y old  Female who presents with a chief complaint of CVA right HP , aphasia (2018 10:54)      HPI:  86 year old woman RH dominant with multiple vascular risk factors, including age and HTN was transferred from [facility] to [facility] for acute onset of right-sided weakness. She was noted to have developed acute onset of right-sided weakness and language disturbance. She was initially evaluated at [facility] through tele-stroke and was treated with IV tPA (Admission NIHSS 22). MRI brain showed left CYNDEE and MCA distribution infarct with mild hemorrhagic transformation (HI 1). 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    During hospital stay was neurologically without acute change: remains with global aphasia, right hemiplegia . Permissive hypertension was slowly titrated to normotension. Course was complicated by urinary retention which was managed with PRN straight catheterization. MBS was performed and patient was placed on a Dysphagia 1 honey diet which has been well-tolerated. Lipitor discontinued due to admission LDL of 104. Family meeting to discuss goals of care done on  2pm, family decided code status DNR/DNI. Discussed and completed MOLST form. Briefly,  pt is DNR/DNI,  TRIAL OF NG TUBE IF BECOMES DYSPHASIC, IV FLUIDS AS NEEDED,  ANTIBIOTICS FOR INFECTION, RETURN TO HOSPITAL, FULL MEDICAL MANAGEMENT SHORT OF INTUBATION AND RESUSCITATION. Cardiac monitoring revealed no events. TTE shows EF 74% no evidence of thrombus or PFO.  Deemed medically stable for acute rehab. (15 Nov 2018 16:23)      PAST MEDICAL & SURGICAL HISTORY:  HTN (hypertension)  Hypertension  S/P appendectomy      MEDICATIONS  (STANDING):  aspirin  chewable 81 milliGRAM(s) Oral daily  bisacodyl 5 milliGRAM(s) Oral at bedtime  docusate sodium 100 milliGRAM(s) Oral three times a day  enoxaparin Injectable 40 milliGRAM(s) SubCutaneous daily  FLUoxetine 20 milliGRAM(s) Oral daily  losartan 100 milliGRAM(s) Oral daily  nystatin    Suspension 000922 Unit(s) Oral three times a day  polyethylene glycol 3350 17 Gram(s) Oral daily  senna 2 Tablet(s) Oral at bedtime    MEDICATIONS  (PRN):  acetaminophen   Tablet .. 650 milliGRAM(s) Oral every 6 hours PRN Temp greater or equal to 38C (100.4F), Mild Pain (1 - 3)  bisacodyl Suppository 10 milliGRAM(s) Rectal daily PRN Constipation      Allergies    No Known Allergies    Intolerances        TODAY'S SUBJECTIVE & REVIEW OF SYMPTOMS:    Patient stable, seen with family and language line  in [place]  IC #[id number]. Patient uses hand gestures, fair reliability but becoming more consistent. More verbalizes today. Denies H /A, dizziness, Does answer "yes" when asked if she has belly pain and pain with urination.     PHYSICAL EXAM  Vital Signs Last 24 Hrs  T(C): 36.4 (2018 08:28), Max: 36.7 (2018 20:40)  T(F): 97.5 (2018 08:28), Max: 98 (2018 20:40)  HR: 79 (2018 08:28) (66 - 79)  BP: 148/78 (2018 08:28) (133/63 - 148/78)  RR: 14 (2018 08:28) (14 - 14)  SpO2: 97% (2018 08:28) (97% - 98%)      General: alert eyes open spontaneously. vocalizing more this morning. can follow simple commands  +global aphasia    Resp: clear bilaterally no R/r/W  Cardio: RRR HR 76  Abdomen: +BS soft, NT ?suprapubic discomfort no R/G  Extremities: calves sosft, NT  right UE hypotonic/flaccid  RLE 1/5 with plantar stimulation    Skin: intact      RECENT LABS:               139  |  104  |  14  ----------------------------<  113<H>  4.4   |  28  |  0.87    Ca    10.3      2018 06:35                          12.2   5.4   )-----------( 276      ( 2018 06:35 )             36.3   Urinalysis Basic - ( 2018 20:30 )    Color: Yellow / Appearance: Clear / S.015 / pH: x  Gluc: x / Ketone: Negative  / Bili: Negative / Urobili: Negative   Blood: x / Protein: 30 mg/dL / Nitrite: Positive   Leuk Esterase: Moderate / RBC: 0-4 /HPF / WBC 26-50 /HPF   Sq Epi: x / Non Sq Epi: Neg.-Few / Bacteria: TNTC /HPF Patient is a 86y old  Female who presents with a chief complaint of CVA right HP , aphasia (2018 10:54)      HPI:  86 year old woman RH dominant with multiple vascular risk factors, including age and HTN was transferred from [facility] to [facility] for acute onset of right-sided weakness. She was noted to have developed acute onset of right-sided weakness and language disturbance. She was initially evaluated at [facility] through tele-stroke and was treated with IV tPA (Admission NIHSS 22). MRI brain showed left CYNDEE and MCA distribution infarct with mild hemorrhagic transformation (HI 1). 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    During hospital stay was neurologically without acute change: remains with global aphasia, right hemiplegia . Permissive hypertension was slowly titrated to normotension. Course was complicated by urinary retention which was managed with PRN straight catheterization. MBS was performed and patient was placed on a Dysphagia 1 honey diet which has been well-tolerated. Lipitor discontinued due to admission LDL of 104. Family meeting to discuss goals of care done on  2pm, family decided code status DNR/DNI. Discussed and completed MOLST form. Briefly,  pt is DNR/DNI,  TRIAL OF NG TUBE IF BECOMES DYSPHASIC, IV FLUIDS AS NEEDED,  ANTIBIOTICS FOR INFECTION, RETURN TO HOSPITAL, FULL MEDICAL MANAGEMENT SHORT OF INTUBATION AND RESUSCITATION. Cardiac monitoring revealed no events. TTE shows EF 74% no evidence of thrombus or PFO.  Deemed medically stable for acute rehab. (15 Nov 2018 16:23)      PAST MEDICAL & SURGICAL HISTORY:  HTN (hypertension)  Hypertension  S/P appendectomy      MEDICATIONS  (STANDING):  aspirin  chewable 81 milliGRAM(s) Oral daily  bisacodyl 5 milliGRAM(s) Oral at bedtime  docusate sodium 100 milliGRAM(s) Oral three times a day  enoxaparin Injectable 40 milliGRAM(s) SubCutaneous daily  FLUoxetine 20 milliGRAM(s) Oral daily  losartan 100 milliGRAM(s) Oral daily  nystatin    Suspension 712110 Unit(s) Oral three times a day  polyethylene glycol 3350 17 Gram(s) Oral daily  senna 2 Tablet(s) Oral at bedtime    MEDICATIONS  (PRN):  acetaminophen   Tablet .. 650 milliGRAM(s) Oral every 6 hours PRN Temp greater or equal to 38C (100.4F), Mild Pain (1 - 3)  bisacodyl Suppository 10 milliGRAM(s) Rectal daily PRN Constipation      Allergies    No Known Allergies    Intolerances        TODAY'S SUBJECTIVE & REVIEW OF SYMPTOMS:    Patient stable, seen with family and language line  in [place]  IC #[id number]. Patient uses hand gestures, fair reliability but becoming more consistent. More verbalizes today. Denies H /A, dizziness, Does answer "yes" when asked if she has belly pain and pain with urination.  tolerating upgrade in diet to mechanical soft     PHYSICAL EXAM  Vital Signs Last 24 Hrs  T(C): 36.4 (2018 08:28), Max: 36.7 (2018 20:40)  T(F): 97.5 (2018 08:28), Max: 98 (2018 20:40)  HR: 79 (2018 08:28) (66 - 79)  BP: 148/78 (2018 08:28) (133/63 - 148/78)  RR: 14 (2018 08:28) (14 - 14)  SpO2: 97% (2018 08:28) (97% - 98%)      General: alert eyes open spontaneously. vocalizing more this morning (says "Ambrocio"). can follow simple commands  +global aphasia Gestures more reliable for yes/no although still benefits from repetition for accuracy    Resp: clear bilaterally no R/r/W  Cardio: RRR HR 76  Abdomen: +BS soft, NT no suprapubic discomfort no R/G  Extremities: calves sosft, NT  right UE hypotonic/flaccid  RLE 1/5 with plantar stimulation    Skin: intact      RECENT LABS:               139  |  104  |  14  ----------------------------<  113<H>  4.4   |  28  |  0.87    Ca    10.3      2018 06:35                          12.2   5.4   )-----------( 276      ( 2018 06:35 )             36.3   Urinalysis Basic - ( 2018 20:30 )    Color: Yellow / Appearance: Clear / S.015 / pH: x  Gluc: x / Ketone: Negative  / Bili: Negative / Urobili: Negative   Blood: x / Protein: 30 mg/dL / Nitrite: Positive   Leuk Esterase: Moderate / RBC: 0-4 /HPF / WBC 26-50 /HPF   Sq Epi: x / Non Sq Epi: Neg.-Few / Bacteria: TNTC /HPF

## 2018-11-21 NOTE — PROGRESS NOTE ADULT - ASSESSMENT
ASSESSMENT/PLAN  86 year-old woman with a PMH of HTN found to have left ICA (MCA + CYNDEE) stroke s/p tPA  with dense right HP, global aphasia, dysphagia, hemisensory deficits    # Left ICA stroke unknown etiology- with global aphasia and functional quadriplegia  -Aspirin, Statin discontinued as stroke not thought to be of atherosclerotic origin  -Fluoxetine for motor recovery after stroke . tolerating, some RLE movement elicited over weekend. Per famly, patient was on SSRI as well prior to CVA  -Continue OT, PT, SLP services    #Cardiovascular- no PFO on ECHO, EF 74%, HTN  -Losartan. Bp controlled    #GI-  -Decrease Tylenol dose to 325mg prn. Trend LFTs-stable   -Colace and Senna changed from PRN to standing orders  -bladder scans q12h, straight cath if retaining (PVRs generally under 200cc)    -# DIET  upgarded to mechanical soft and honey thickened liquids-tolerating     #Code Status: As per meeting with Palliative Team at [facility]  pt is DNR/DNI,  TRIAL OF NG TUBE IF BECOMES DYSPHASIC, IV FLUIDS AS NEEDED,  ANTIBIOTICS FOR INFECTION, RETURN TO HOSPITAL, FULL MEDICAL MANAGEMENT SHORT OF INTUBATION AND RESUSCITATION  -f/u MOLST form      # For IDT rounds   patient motivated, family involved and will be supportive on dc. Goals for min-mod assist, likely wheelchair level on dc, needs 24 hours assistance and supervision due to plegia and aphasia. caregiver training, target dc home 12/6/18 with home PT OT, SLP referral    #r/o UTI, + dysuria  -UA positive, culture sent, start macrobid today     LABS: Urine culture sent and results pending   CBC BMP 11/23 ASSESSMENT/PLAN  86 year-old woman with a PMH of HTN found to have left ICA (MCA + CYNDEE) stroke s/p tPA  with dense right HP, global aphasia, dysphagia, hemisensory deficits    # Left ICA stroke unknown etiology- with global aphasia and functional quadriplegia  -Aspirin, Statin discontinued as stroke not thought to be of atherosclerotic origin  -continue Fluoxetine for motor recovery after stroke . tolerating, some RLE movement elicited over weekend. Per famly, patient was on SSRI as well prior to CVA  -Continue OT, PT, SLP services    #Cardiovascular  - no PFO on ECHO, EF 74%, HTN  -Losartan. BP controlled on current medication      #r/o UTI, + dysuria  -UA positive, culture sent, start macrobid today 11/21    #GI-  -Decrease Tylenol dose to 325mg prn. Trend LFTs-stable   -Colace and Senna changed from PRN to standing orders    -# DIET  upgarded to mechanical soft and honey thickened liquids-tolerating     #Code Status: As per meeting with Palliative Team at [facility]  pt is DNR/DNI,  TRIAL OF NG TUBE IF BECOMES DYSPHASIC, IV FLUIDS AS NEEDED,  ANTIBIOTICS FOR INFECTION, RETURN TO HOSPITAL, FULL MEDICAL MANAGEMENT SHORT OF INTUBATION AND RESUSCITATION  -f/u MOLST form      # For IDT rounds   patient motivated, family involved and will be supportive on dc. Goals for min-mod assist, likely wheelchair level on dc, needs 24 hours assistance and supervision due to plegia and aphasia. caregiver training, target dc home 12/6/18 with home PT OT, SLP referral      LABS:  - Urine culture sent and results pending   CBC BMP 11/23, 11/26

## 2018-11-22 PROCEDURE — 99232 SBSQ HOSP IP/OBS MODERATE 35: CPT

## 2018-11-22 RX ADMIN — Medication 500000 UNIT(S): at 06:00

## 2018-11-22 RX ADMIN — Medication 100 MILLIGRAM(S): at 07:27

## 2018-11-22 RX ADMIN — Medication 20 MILLIGRAM(S): at 11:13

## 2018-11-22 RX ADMIN — Medication 5 MILLIGRAM(S): at 21:34

## 2018-11-22 RX ADMIN — Medication 500000 UNIT(S): at 13:02

## 2018-11-22 RX ADMIN — Medication 100 MILLIGRAM(S): at 06:00

## 2018-11-22 RX ADMIN — Medication 100 MILLIGRAM(S): at 21:34

## 2018-11-22 RX ADMIN — ENOXAPARIN SODIUM 40 MILLIGRAM(S): 100 INJECTION SUBCUTANEOUS at 11:13

## 2018-11-22 RX ADMIN — LOSARTAN POTASSIUM 100 MILLIGRAM(S): 100 TABLET, FILM COATED ORAL at 06:00

## 2018-11-22 RX ADMIN — Medication 100 MILLIGRAM(S): at 17:36

## 2018-11-22 RX ADMIN — Medication 250 MILLIGRAM(S): at 17:36

## 2018-11-22 RX ADMIN — Medication 500000 UNIT(S): at 21:34

## 2018-11-22 RX ADMIN — Medication 250 MILLIGRAM(S): at 06:00

## 2018-11-22 RX ADMIN — SENNA PLUS 2 TABLET(S): 8.6 TABLET ORAL at 21:34

## 2018-11-22 RX ADMIN — Medication 81 MILLIGRAM(S): at 11:13

## 2018-11-22 NOTE — PROGRESS NOTE ADULT - SUBJECTIVE AND OBJECTIVE BOX
Patient is a 86y old  Female who presents with a chief complaint of CVA right HP , aphasia (2018 12:11)      HPI:  86 year old woman RH dominant with multiple vascular risk factors, including age and HTN was transferred from [facility] to [facility] for acute onset of right-sided weakness. She was noted to have developed acute onset of right-sided weakness and language disturbance. She was initially evaluated at [facility] through tele-stroke and was treated with IV tPA (Admission NIHSS 22). MRI brain showed left CYNDEE and MCA distribution infarct with mild hemorrhagic transformation (HI 1). 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    During hospital stay was neurologically without acute change: remains with global aphasia, right hemiplegia . Permissive hypertension was slowly titrated to normotension. Course was complicated by urinary retention which was managed with PRN straight catheterization. MBS was performed and patient was placed on a Dysphagia 1 honey diet which has been well-tolerated. Lipitor discontinued due to admission LDL of 104. Family meeting to discuss goals of care done on  2pm, family decided code status DNR/DNI. Discussed and completed MOLST form. Briefly,  pt is DNR/DNI,  TRIAL OF NG TUBE IF BECOMES DYSPHASIC, IV FLUIDS AS NEEDED,  ANTIBIOTICS FOR INFECTION, RETURN TO HOSPITAL, FULL MEDICAL MANAGEMENT SHORT OF INTUBATION AND RESUSCITATION. Cardiac monitoring revealed no events. TTE shows EF 74% no evidence of thrombus or PFO.  Deemed medically stable for acute rehab. (15 Nov 2018 16:23)      PAST MEDICAL & SURGICAL HISTORY:  HTN (hypertension)  Hypertension  S/P appendectomy      MEDICATIONS  (STANDING):  aspirin  chewable 81 milliGRAM(s) Oral daily  bisacodyl 5 milliGRAM(s) Oral at bedtime  docusate sodium 100 milliGRAM(s) Oral three times a day  enoxaparin Injectable 40 milliGRAM(s) SubCutaneous daily  FLUoxetine 20 milliGRAM(s) Oral daily  losartan 100 milliGRAM(s) Oral daily  nitrofurantoin monohydrate/macrocrystals (MACROBID) 100 milliGRAM(s) Oral two times a day with meals  nystatin    Suspension 139423 Unit(s) Oral three times a day  polyethylene glycol 3350 17 Gram(s) Oral daily  saccharomyces boulardii 250 milliGRAM(s) Oral two times a day  senna 2 Tablet(s) Oral at bedtime    MEDICATIONS  (PRN):  acetaminophen   Tablet .. 650 milliGRAM(s) Oral every 6 hours PRN Temp greater or equal to 38C (100.4F), Mild Pain (1 - 3)  bisacodyl Suppository 10 milliGRAM(s) Rectal daily PRN Constipation      Allergies    No Known Allergies    Intolerances        TODAY'S SUBJECTIVE & REVIEW OF SYMPTOMS:    Patient stable overnight, some improvement in dysuria and abdominal pain. Bright affect, increased attempts at verbalization with occasional utterances NAD    PHYSICAL EXAM  86y  Vital Signs Last 24 Hrs  T(C): 36.8 (2018 07:28), Max: 37.1 (2018 23:21)  T(F): 98.2 (2018 07:28), Max: 98.7 (2018 23:21)  HR: 78 (2018 07:28) (75 - 78)  BP: 134/68 (2018 07:28) (129/69 - 134/68)  BP(mean): --  RR: 14 (2018 07:28) (14 - 15)  SpO2: 97% (2018 07:28) (97% - 98%)  Daily     Daily     General: alert NAD    Resp: clear bilaterally no R/R/W  Cardio: Nl S1 and S2 regular  Abdomen:  +BS soft, NT ND  Extremities: no calf swelling +soft, no erythema orw armth  hypotonic RUE  withdrawal 1-2/5 proximal RLE  skin intact      RECENT LABS:                          12.2   5.4   )-----------( 276      ( 2018 06:35 )             36.3     -    139  |  104  |  14  ----------------------------<  113<H>  4.4   |  28  |  0.87    Ca    10.3      2018 06:35          Urinalysis Basic - ( 2018 20:30 )    Color: Yellow / Appearance: Clear / S.015 / pH: x  Gluc: x / Ketone: Negative  / Bili: Negative / Urobili: Negative   Blood: x / Protein: 30 mg/dL / Nitrite: Positive   Leuk Esterase: Moderate / RBC: 0-4 /HPF / WBC 26-50 /HPF   Sq Epi: x / Non Sq Epi: Neg.-Few / Bacteria: TNTC /HPF        Culture - Urine (collected 18 @ 04:30)  Source: .Urine Catheterized  Preliminary Report (18 @ 10:34):    >100,000 CFU/ml Enterobacter cloacae/asburiae        CAPILLARY BLOOD GLUCOSE        IMPRESSION AND PLAN:

## 2018-11-22 NOTE — PROGRESS NOTE ADULT - ASSESSMENT
ASSESSMENT/PLAN  86 year-old woman with a PMH of HTN found to have left ICA (MCA + CYNDEE) stroke s/p tPA  with dense right HP, global aphasia, dysphagia, hemisensory deficits    # Left ICA stroke unknown etiology- with global aphasia and functional quadriplegia  -Aspirin, Statin discontinued as stroke not thought to be of atherosclerotic origin  -continue Fluoxetine for motor recovery after stroke . tolerating, some RLE movement elicited over weekend. Per famly, patient was on SSRI as well prior to CVA  -Continue OT, PT, SLP services    #Cardiovascular  - no PFO on ECHO, EF 74%, HTN  -Losartan. BP controlled on current medication      #r/o UTI, +Enterobacter on Cx  -continue macrobid(11/21) follow sensitivities    #GI-  -Decrease Tylenol dose to 325mg prn. Trend LFTs-stable   -Colace and Senna changed from PRN to standing orders    -# DIET  upgarded to mechanical soft and honey thickened liquids    #Code Status: As per meeting with Palliative Team at [facility]  pt is DNR/DNI,  TRIAL OF NG TUBE IF BECOMES DYSPHASIC, IV FLUIDS AS NEEDED,  ANTIBIOTICS FOR INFECTION, RETURN TO HOSPITAL, FULL MEDICAL MANAGEMENT SHORT OF INTUBATION AND RESUSCITATION  -f/u MOLST form      # For IDT rounds   patient motivated, family involved and will be supportive on dc. Goals for min-mod assist, likely wheelchair level on dc, needs 24 hours assistance and supervision due to plegia and aphasia. caregiver training,  - target dc home 12/6/18 with home PT OT, SLP referral      LABS:  - Urine culture sensitivities  CBC BMP 11/23, 11/26

## 2018-11-23 LAB
-  AMIKACIN: SIGNIFICANT CHANGE UP
-  AMOXICILLIN/CLAVULANIC ACID: SIGNIFICANT CHANGE UP
-  AMPICILLIN/SULBACTAM: SIGNIFICANT CHANGE UP
-  AMPICILLIN: SIGNIFICANT CHANGE UP
-  AZTREONAM: SIGNIFICANT CHANGE UP
-  CEFAZOLIN: SIGNIFICANT CHANGE UP
-  CEFEPIME: SIGNIFICANT CHANGE UP
-  CEFOXITIN: SIGNIFICANT CHANGE UP
-  CEFTRIAXONE: SIGNIFICANT CHANGE UP
-  CIPROFLOXACIN: SIGNIFICANT CHANGE UP
-  ERTAPENEM: SIGNIFICANT CHANGE UP
-  GENTAMICIN: SIGNIFICANT CHANGE UP
-  IMIPENEM: SIGNIFICANT CHANGE UP
-  LEVOFLOXACIN: SIGNIFICANT CHANGE UP
-  MEROPENEM: SIGNIFICANT CHANGE UP
-  NITROFURANTOIN: SIGNIFICANT CHANGE UP
-  PIPERACILLIN/TAZOBACTAM: SIGNIFICANT CHANGE UP
-  TIGECYCLINE: SIGNIFICANT CHANGE UP
-  TOBRAMYCIN: SIGNIFICANT CHANGE UP
-  TRIMETHOPRIM/SULFAMETHOXAZOLE: SIGNIFICANT CHANGE UP
ALBUMIN SERPL ELPH-MCNC: 3.4 G/DL — SIGNIFICANT CHANGE UP (ref 3.3–5)
ALP SERPL-CCNC: 77 U/L — SIGNIFICANT CHANGE UP (ref 40–120)
ALT FLD-CCNC: 83 U/L DA — HIGH (ref 10–45)
ANION GAP SERPL CALC-SCNC: 10 MMOL/L — SIGNIFICANT CHANGE UP (ref 5–17)
AST SERPL-CCNC: 60 U/L — HIGH (ref 10–40)
BILIRUB SERPL-MCNC: 0.4 MG/DL — SIGNIFICANT CHANGE UP (ref 0.2–1.2)
BUN SERPL-MCNC: 16 MG/DL — SIGNIFICANT CHANGE UP (ref 7–23)
CALCIUM SERPL-MCNC: 9.7 MG/DL — SIGNIFICANT CHANGE UP (ref 8.4–10.5)
CHLORIDE SERPL-SCNC: 105 MMOL/L — SIGNIFICANT CHANGE UP (ref 96–108)
CO2 SERPL-SCNC: 25 MMOL/L — SIGNIFICANT CHANGE UP (ref 22–31)
CREAT SERPL-MCNC: 0.76 MG/DL — SIGNIFICANT CHANGE UP (ref 0.5–1.3)
CULTURE RESULTS: SIGNIFICANT CHANGE UP
GLUCOSE SERPL-MCNC: 101 MG/DL — HIGH (ref 70–99)
HCT VFR BLD CALC: 34.6 % — SIGNIFICANT CHANGE UP (ref 34.5–45)
HGB BLD-MCNC: 11.5 G/DL — SIGNIFICANT CHANGE UP (ref 11.5–15.5)
MCHC RBC-ENTMCNC: 33.3 GM/DL — SIGNIFICANT CHANGE UP (ref 32–36)
MCHC RBC-ENTMCNC: 33.4 PG — SIGNIFICANT CHANGE UP (ref 27–34)
MCV RBC AUTO: 100.5 FL — HIGH (ref 80–100)
METHOD TYPE: SIGNIFICANT CHANGE UP
ORGANISM # SPEC MICROSCOPIC CNT: SIGNIFICANT CHANGE UP
ORGANISM # SPEC MICROSCOPIC CNT: SIGNIFICANT CHANGE UP
PLATELET # BLD AUTO: 259 K/UL — SIGNIFICANT CHANGE UP (ref 150–400)
POTASSIUM SERPL-MCNC: 4.1 MMOL/L — SIGNIFICANT CHANGE UP (ref 3.5–5.3)
POTASSIUM SERPL-SCNC: 4.1 MMOL/L — SIGNIFICANT CHANGE UP (ref 3.5–5.3)
PROT SERPL-MCNC: 6.4 G/DL — SIGNIFICANT CHANGE UP (ref 6–8.3)
RBC # BLD: 3.44 M/UL — LOW (ref 3.8–5.2)
RBC # FLD: 11.6 % — SIGNIFICANT CHANGE UP (ref 10.3–14.5)
SODIUM SERPL-SCNC: 140 MMOL/L — SIGNIFICANT CHANGE UP (ref 135–145)
SPECIMEN SOURCE: SIGNIFICANT CHANGE UP
WBC # BLD: 4.8 K/UL — SIGNIFICANT CHANGE UP (ref 3.8–10.5)
WBC # FLD AUTO: 4.8 K/UL — SIGNIFICANT CHANGE UP (ref 3.8–10.5)

## 2018-11-23 PROCEDURE — 99232 SBSQ HOSP IP/OBS MODERATE 35: CPT

## 2018-11-23 PROCEDURE — 99233 SBSQ HOSP IP/OBS HIGH 50: CPT

## 2018-11-23 RX ORDER — ATORVASTATIN CALCIUM 80 MG/1
40 TABLET, FILM COATED ORAL AT BEDTIME
Qty: 0 | Refills: 0 | Status: DISCONTINUED | OUTPATIENT
Start: 2018-11-23 | End: 2018-12-04

## 2018-11-23 RX ORDER — CIPROFLOXACIN LACTATE 400MG/40ML
250 VIAL (ML) INTRAVENOUS EVERY 12 HOURS
Qty: 0 | Refills: 0 | Status: DISCONTINUED | OUTPATIENT
Start: 2018-11-23 | End: 2018-11-27

## 2018-11-23 RX ADMIN — Medication 100 MILLIGRAM(S): at 13:08

## 2018-11-23 RX ADMIN — Medication 250 MILLIGRAM(S): at 17:38

## 2018-11-23 RX ADMIN — Medication 100 MILLIGRAM(S): at 11:37

## 2018-11-23 RX ADMIN — ENOXAPARIN SODIUM 40 MILLIGRAM(S): 100 INJECTION SUBCUTANEOUS at 11:37

## 2018-11-23 RX ADMIN — Medication 500000 UNIT(S): at 05:52

## 2018-11-23 RX ADMIN — ATORVASTATIN CALCIUM 40 MILLIGRAM(S): 80 TABLET, FILM COATED ORAL at 20:59

## 2018-11-23 RX ADMIN — Medication 20 MILLIGRAM(S): at 11:38

## 2018-11-23 RX ADMIN — SENNA PLUS 2 TABLET(S): 8.6 TABLET ORAL at 20:59

## 2018-11-23 RX ADMIN — Medication 100 MILLIGRAM(S): at 20:58

## 2018-11-23 RX ADMIN — Medication 100 MILLIGRAM(S): at 05:52

## 2018-11-23 RX ADMIN — Medication 250 MILLIGRAM(S): at 05:52

## 2018-11-23 RX ADMIN — Medication 500000 UNIT(S): at 20:59

## 2018-11-23 RX ADMIN — Medication 81 MILLIGRAM(S): at 11:38

## 2018-11-23 RX ADMIN — Medication 500000 UNIT(S): at 13:08

## 2018-11-23 RX ADMIN — Medication 5 MILLIGRAM(S): at 20:59

## 2018-11-23 RX ADMIN — POLYETHYLENE GLYCOL 3350 17 GRAM(S): 17 POWDER, FOR SOLUTION ORAL at 11:37

## 2018-11-23 RX ADMIN — LOSARTAN POTASSIUM 100 MILLIGRAM(S): 100 TABLET, FILM COATED ORAL at 05:52

## 2018-11-23 NOTE — PROGRESS NOTE ADULT - ASSESSMENT
ASSESSMENT/PLAN  86 year-old woman with a PMH of HTN found to have left ICA (MCA + CYNDEE) stroke s/p tPA  with dense right HP, global aphasia, dysphagia, hemisensory deficits    # Left ICA stroke unknown etiology- with global aphasia and functional quadriplegia  -Aspirin, Statin discontinued as stroke not thought to be of atherosclerotic origin  -continue Fluoxetine for motor recovery after stroke . tolerating, some RLE movement elicited over weekend. Per famly, patient was on SSRI as well prior to CVA  -Continue OT, PT, SLP services    #Cardiovascular  - no PFO on ECHO, EF 74%, HTN  -Losartan.      #r/o UTI, +Enterobacter on Cx  -resistant to macrobid. switch to cipro 250 q12 11/23  no leukocytosis or fever, BUn/Cr stable    #GI-  -Decrease Tylenol dose to 325mg prn. Trend LFTs-stable   -Colace and Senna changed from PRN to standing orders    -# DIET  upgarded to mechanical soft and honey thickened liquids    #Code Status: As per meeting with Palliative Team at [facility]  pt is DNR/DNI,  TRIAL OF NG TUBE IF BECOMES DYSPHASIC, IV FLUIDS AS NEEDED,  ANTIBIOTICS FOR INFECTION, RETURN TO HOSPITAL, FULL MEDICAL MANAGEMENT SHORT OF INTUBATION AND RESUSCITATION  -f/u MOLST form      # For IDT rounds   patient motivated, family involved and will be supportive on dc. Goals for min-mod assist, likely wheelchair level on dc, needs 24 hours assistance and supervision due to plegia and aphasia. caregiver training,  - target dc home 12/6/18 with home PT OT, SLP referral      LABS:    CBC BMP 11/26

## 2018-11-23 NOTE — PROGRESS NOTE ADULT - SUBJECTIVE AND OBJECTIVE BOX
HPI  Pt is a 85yo F admitted to acute rehab for left ICA CVA  Pt was seen and examined in the wheelchair     awaiting for aid to feed patient lunch.     has obvious dysphasia   no fc no sob    ros all others are negative     ICU Vital Signs Last 24 Hrs  T(C): --  T(F): --  HR: --  BP: 130/65 (23 Nov 2018 05:51) (130/65 - 130/65)  BP(mean): --  ABP: --  ABP(mean): --  RR: --  SpO2: --    MEDICATIONS  (STANDING):  aspirin  chewable 81 milliGRAM(s) Oral daily  bisacodyl 5 milliGRAM(s) Oral at bedtime  docusate sodium 100 milliGRAM(s) Oral three times a day  enoxaparin Injectable 40 milliGRAM(s) SubCutaneous daily  FLUoxetine 20 milliGRAM(s) Oral daily  losartan 100 milliGRAM(s) Oral daily  nitrofurantoin monohydrate/macrocrystals (MACROBID) 100 milliGRAM(s) Oral two times a day with meals  nystatin    Suspension 222612 Unit(s) Oral three times a day  polyethylene glycol 3350 17 Gram(s) Oral daily  saccharomyces boulardii 250 milliGRAM(s) Oral two times a day  senna 2 Tablet(s) Oral at bedtime    MEDICATIONS  (PRN):  acetaminophen   Tablet .. 650 milliGRAM(s) Oral every 6 hours PRN Temp greater or equal to 38C (100.4F), Mild Pain (1 - 3)  bisacodyl Suppository 10 milliGRAM(s) Rectal daily PRN Constipation    PAST MEDICAL & SURGICAL HISTORY:  HTN (hypertension)  Hypertension  S/P appendectomy                          11.5   4.8   )-----------( 259      ( 23 Nov 2018 06:15 )             34.6     11-23    140  |  105  |  16  ----------------------------<  101<H>  4.1   |  25  |  0.76    Ca    9.7      23 Nov 2018 06:15    TPro  6.4  /  Alb  3.4  /  TBili  0.4  /  DBili  x   /  AST  60<H>  /  ALT  83<H>  /  AlkPhos  77  11-23        LABS: All Labs Reviewed:                        11.6   4.9   )-----------( 250      ( 19 Nov 2018 06:15 )             34.5     11-19    138  |  106  |  21  ----------------------------<  110<H>  4.0   |  27  |  0.82    Ca    9.7      19 Nov 2018 06:15    TPro  6.4  /  Alb  3.2<L>  /  TBili  0.4  /  DBili  x   /  AST  42<H>  /  ALT  51<H>  /  AlkPhos  80  11-19          Blood Culture:     RADIOLOGY/EKG:    DVT PPX:    ADVANCED DIRECTIVE:    DISPOSITION:

## 2018-11-23 NOTE — PROGRESS NOTE ADULT - SUBJECTIVE AND OBJECTIVE BOX
Patient is a 86y old  Female who presents with a chief complaint of CVA right HP , aphasia fu (23 Nov 2018 12:49)      HPI:  86 year old woman RH dominant with multiple vascular risk factors, including age and HTN was transferred from [facility] to [facility] for acute onset of right-sided weakness. She was noted to have developed acute onset of right-sided weakness and language disturbance. She was initially evaluated at [facility] through tele-stroke and was treated with IV tPA (Admission NIHSS 22). MRI brain showed left CYNDEE and MCA distribution infarct with mild hemorrhagic transformation (HI 1). 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    During hospital stay was neurologically without acute change: remains with global aphasia, right hemiplegia . Permissive hypertension was slowly titrated to normotension. Course was complicated by urinary retention which was managed with PRN straight catheterization. MBS was performed and patient was placed on a Dysphagia 1 honey diet which has been well-tolerated. Lipitor discontinued due to admission LDL of 104. Family meeting to discuss goals of care done on 11/8 2pm, family decided code status DNR/DNI. Discussed and completed MOLST form. Briefly,  pt is DNR/DNI,  TRIAL OF NG TUBE IF BECOMES DYSPHASIC, IV FLUIDS AS NEEDED,  ANTIBIOTICS FOR INFECTION, RETURN TO HOSPITAL, FULL MEDICAL MANAGEMENT SHORT OF INTUBATION AND RESUSCITATION. Cardiac monitoring revealed no events. TTE shows EF 74% no evidence of thrombus or PFO.  Deemed medically stable for acute rehab. (15 Nov 2018 16:23)      PAST MEDICAL & SURGICAL HISTORY:  HTN (hypertension)  Hypertension  S/P appendectomy      MEDICATIONS  (STANDING):  aspirin  chewable 81 milliGRAM(s) Oral daily  atorvastatin 40 milliGRAM(s) Oral at bedtime  bisacodyl 5 milliGRAM(s) Oral at bedtime  docusate sodium 100 milliGRAM(s) Oral three times a day  enoxaparin Injectable 40 milliGRAM(s) SubCutaneous daily  FLUoxetine 20 milliGRAM(s) Oral daily  losartan 100 milliGRAM(s) Oral daily  nitrofurantoin monohydrate/macrocrystals (MACROBID) 100 milliGRAM(s) Oral two times a day with meals  nystatin    Suspension 753195 Unit(s) Oral three times a day  polyethylene glycol 3350 17 Gram(s) Oral daily  saccharomyces boulardii 250 milliGRAM(s) Oral two times a day  senna 2 Tablet(s) Oral at bedtime    MEDICATIONS  (PRN):  acetaminophen   Tablet .. 650 milliGRAM(s) Oral every 6 hours PRN Temp greater or equal to 38C (100.4F), Mild Pain (1 - 3)  bisacodyl Suppository 10 milliGRAM(s) Rectal daily PRN Constipation      Allergies    No Known Allergies    Intolerances        TODAY'S SUBJECTIVE & REVIEW OF SYMPTOMS:    Patient stable. Seen with assistance language line  [facility] #[id number]. Patient states still has burning pain on urination, has not improved with macrobid. No N/V. NO constipation. otherwise, yes/no with gestures improving    PHYSICAL EXAM  86y  Vital Signs Last 24 Hrs  T(C): 36.7 (23 Nov 2018 12:45), Max: 36.7 (23 Nov 2018 12:45)  T(F): 98 (23 Nov 2018 12:45), Max: 98 (23 Nov 2018 12:45)  HR: 72 (23 Nov 2018 12:45) (72 - 72)  BP: 150/91 (23 Nov 2018 12:45) (130/65 - 150/91)  BP(mean): --  RR: 16 (23 Nov 2018 12:45) (16 - 16)  SpO2: 99% (23 Nov 2018 12:45) (99% - 99%)  Daily     Daily     General: alert NAD no fever    Resp: clear bilaterally no R?R/W  Cardio: Nl S1 and S2 regular Hr 72  Abdomen: +Bs soft, Nt ND  NO suprapubic pain  Extremities: no calf swelling +soft, Nt amanda rytehma or warmth  RLE withdraws 1-2/5 proximally  RUE 0/5 AROM noted  Neuro: global aphasia  Skin: intact      RECENT LABS:                          11.5   4.8   )-----------( 259      ( 23 Nov 2018 06:15 )             34.6     11-23    140  |  105  |  16  ----------------------------<  101<H>  4.1   |  25  |  0.76    Ca    9.7      23 Nov 2018 06:15    TPro  6.4  /  Alb  3.4  /  TBili  0.4  /  DBili  x   /  AST  60<H>  /  ALT  83<H>  /  AlkPhos  77  11-23    LIVER FUNCTIONS - ( 23 Nov 2018 06:15 )  Alb: 3.4 g/dL / Pro: 6.4 g/dL / ALK PHOS: 77 U/L / ALT: 83 U/L DA / AST: 60 U/L / GGT: x                 Culture - Urine (collected 11-21-18 @ 04:30)  Source: .Urine Catheterized  Final Report (11-23-18 @ 09:10):    >100,000 CFU/ml Enterobacter cloacae/asburiae  Organism: Enterobacter cloacae/absuria (11-23-18 @ 09:10)  Organism: Enterobacter cloacae/absuria (11-23-18 @ 09:10)      -  Amikacin: S <=8      -  Amoxicillin/Clavulanic Acid: R >16/8      -  Ampicillin: R 16 These ampicillin results predict results for amoxicillin      -  Ampicillin/Sulbactam: R 16/8      -  Aztreonam: S 8      -  Cefazolin: R >16      -  Cefepime: S <=2      -  Cefoxitin: R >16      -  Ceftriaxone: S <=1 Enterobacter, Citrobacter, and Serratia may develop resistance during prolonged therapy      -  Ciprofloxacin: S <=0.5      -  Ertapenem: S <=0.5      -  Gentamicin: S <=1      -  Imipenem: S <=1      -  Levofloxacin: S <=1      -  Meropenem: S <=1      -  Nitrofurantoin: R >64 Should not be used to treat pyelonephritis      -  Piperacillin/Tazobactam: S <=8      -  Tigecycline: S <=1      -  Tobramycin: S <=2      -  Trimethoprim/Sulfamethoxazole: S <=0.5/9.5      Method Type: REJI        CAPILLARY BLOOD GLUCOSE        IMPRESSION AND PLAN:

## 2018-11-23 NOTE — CHART NOTE - NSCHARTNOTEFT_GEN_A_CORE
Nutrition Follow Up   Hospital Course (Per Electronic Medical Record):   Source: Patient [ ]    Family [ ]     Medical Record/Nursing Staff [X]     Diet: Low Sodium Mechanical Soft Diet w/ Honey Thick Liquids - Changed on 11/20  Tolerates Diet Well  No Chewing/Swallowing Difficulties   Consumes 50-75% of Meals  Feed by Staff    Enteral /Parenteral Nutrition: N/A    Current Weight: 128.3lb on 11/15  % Weight Change: N/A  Obtain New Weight  Obtain Weights Weekly     Pertinent Medications: MEDICATIONS  (STANDING):  aspirin  chewable 81 milliGRAM(s) Oral daily  bisacodyl 5 milliGRAM(s) Oral at bedtime  docusate sodium 100 milliGRAM(s) Oral three times a day  enoxaparin Injectable 40 milliGRAM(s) SubCutaneous daily  FLUoxetine 20 milliGRAM(s) Oral daily  losartan 100 milliGRAM(s) Oral daily  nitrofurantoin monohydrate/macrocrystals (MACROBID) 100 milliGRAM(s) Oral two times a day with meals  nystatin    Suspension 731298 Unit(s) Oral three times a day  polyethylene glycol 3350 17 Gram(s) Oral daily  saccharomyces boulardii 250 milliGRAM(s) Oral two times a day  senna 2 Tablet(s) Oral at bedtime    MEDICATIONS  (PRN):  acetaminophen   Tablet .. 650 milliGRAM(s) Oral every 6 hours PRN Temp greater or equal to 38C (100.4F), Mild Pain (1 - 3)  bisacodyl Suppository 10 milliGRAM(s) Rectal daily PRN Constipation    Pertinent Labs:  11-23 Na140 mmol/L Glu 101 mg/dL<H> K+ 4.1 mmol/L Cr  0.76 mg/dL BUN 16 mg/dL 11-23 Alb 3.4 g/dL 11-16 PAB 18 mg/dL<L> 11-03 [id number] 5.4 % 11-03 Chol 169 mg/dL  mg/dL HDL 54 mg/dL Trig 57 mg/dL    Skin: No Pressure Ulcers     Edema: None Noted    Last BM: 11/22    Estimated Needs:   [X] No Change since Previous Assessment  [ ] Recalculated:     Previous Nutrition Diagnosis:   Chewing/Swallowing Difficulties     Nutrition Diagnosis is [ ] Ongoing  [ ] Resolved [X] Not Applicable   Continues Mechanical Soft Diet w/ Honey Thick Liquids      New Nutrition Diagnosis: [X] Not Applicable     Interventions:   1. Recommend Continue Nutrition Plan of Care     Monitoring & Evaluation:   [X] PO Intake   [X] Tolerance to Diet Prescription   [X] Weights   [X] Follow Up (Per Protocol)  [X] Other: Labs     RD Remains Available.  Parviz Hayden RD

## 2018-11-23 NOTE — PROGRESS NOTE ADULT - ASSESSMENT
HPI  Pt is a 85yo F admitted to acute rehab for left ICA CVA    *Left ICA ischemic CVA   Admit to acute rehab  Cont PT/OT/SLP   Cont asa, consider starting statin if no intolerance exists  Fluoxetine for motor improvement     *Essential HTN  Controlled  Cont Losartan     *dysphagia with aphasia  Pureed w honey thickened liquid  SLP  aspiration precautions     *DVT ppx   Lovenox     high risk for morbidity, mortality secondary to the above mentioned medical conditions  reviewed with patient plan of care

## 2018-11-24 PROCEDURE — 99233 SBSQ HOSP IP/OBS HIGH 50: CPT

## 2018-11-24 PROCEDURE — 99232 SBSQ HOSP IP/OBS MODERATE 35: CPT

## 2018-11-24 RX ORDER — PHENOL/SODIUM PHENOLATE
1 AEROSOL, SPRAY (ML) MUCOUS MEMBRANE EVERY 4 HOURS
Qty: 0 | Refills: 0 | Status: DISCONTINUED | OUTPATIENT
Start: 2018-11-24 | End: 2018-12-11

## 2018-11-24 RX ADMIN — Medication 100 MILLIGRAM(S): at 21:51

## 2018-11-24 RX ADMIN — Medication 250 MILLIGRAM(S): at 05:41

## 2018-11-24 RX ADMIN — POLYETHYLENE GLYCOL 3350 17 GRAM(S): 17 POWDER, FOR SOLUTION ORAL at 13:16

## 2018-11-24 RX ADMIN — LOSARTAN POTASSIUM 100 MILLIGRAM(S): 100 TABLET, FILM COATED ORAL at 05:41

## 2018-11-24 RX ADMIN — Medication 250 MILLIGRAM(S): at 18:18

## 2018-11-24 RX ADMIN — Medication 500000 UNIT(S): at 05:44

## 2018-11-24 RX ADMIN — Medication 500000 UNIT(S): at 13:15

## 2018-11-24 RX ADMIN — Medication 1 SPRAY(S): at 13:46

## 2018-11-24 RX ADMIN — Medication 5 MILLIGRAM(S): at 21:51

## 2018-11-24 RX ADMIN — Medication 100 MILLIGRAM(S): at 05:44

## 2018-11-24 RX ADMIN — Medication 20 MILLIGRAM(S): at 11:52

## 2018-11-24 RX ADMIN — ATORVASTATIN CALCIUM 40 MILLIGRAM(S): 80 TABLET, FILM COATED ORAL at 21:51

## 2018-11-24 RX ADMIN — SENNA PLUS 2 TABLET(S): 8.6 TABLET ORAL at 21:51

## 2018-11-24 RX ADMIN — Medication 250 MILLIGRAM(S): at 05:45

## 2018-11-24 RX ADMIN — ENOXAPARIN SODIUM 40 MILLIGRAM(S): 100 INJECTION SUBCUTANEOUS at 11:52

## 2018-11-24 RX ADMIN — Medication 100 MILLIGRAM(S): at 13:15

## 2018-11-24 RX ADMIN — Medication 81 MILLIGRAM(S): at 11:52

## 2018-11-24 RX ADMIN — Medication 500000 UNIT(S): at 21:53

## 2018-11-24 NOTE — PROGRESS NOTE ADULT - ASSESSMENT
HPI  Pt is a 85yo F admitted to acute rehab for left ICA CVA    *Left ICA ischemic CVA   Cont PT/OT/SLP   CAD risk management   Fluoxetine for motor improvement     *Essential HTN  Controlled  Cont Losartan     *dysphagia with aphasia  Pureed w honey thickened liquid  SLP  aspiration precautions     *DVT ppx   Lovenox     high risk for morbidity, mortality secondary to the above mentioned medical conditions   vte proph   reviewed with patient family plan of care     high risk for morbidity, mortality secondary to the above mentioned medical conditions  reviewed with patient plan of care

## 2018-11-24 NOTE — PROGRESS NOTE ADULT - SUBJECTIVE AND OBJECTIVE BOX
No overnight events.  Appears comfortable.      REVIEW OF SYSTEMS  Constitutional - No fever,  +fatigue  HEENT - No vertigo, No neck pain  Neurological - No headaches, +loss of strength  Musculoskeletal - No joint pain, No joint swelling, No muscle pain    VITALS  T(C): 37.1 (11-24-18 @ 07:28), Max: 37.1 (11-24-18 @ 04:51)  HR: 71 (11-24-18 @ 07:28) (71 - 75)  BP: 133/73 (11-24-18 @ 07:28) (133/73 - 135/75)  RR: 15 (11-24-18 @ 07:28) (15 - 15)  SpO2: 96% (11-24-18 @ 07:28) (96% - 97%)  Wt(kg): --       MEDICATIONS   acetaminophen   Tablet .. 650 milliGRAM(s) every 6 hours PRN  aspirin  chewable 81 milliGRAM(s) daily  atorvastatin 40 milliGRAM(s) at bedtime  bisacodyl 5 milliGRAM(s) at bedtime  bisacodyl Suppository 10 milliGRAM(s) daily PRN  ciprofloxacin     Tablet 250 milliGRAM(s) every 12 hours  docusate sodium 100 milliGRAM(s) three times a day  enoxaparin Injectable 40 milliGRAM(s) daily  FLUoxetine 20 milliGRAM(s) daily  losartan 100 milliGRAM(s) daily  nystatin    Suspension 238186 Unit(s) three times a day  phenol 1.4% (CHLORASEPTIC) Oral Spray 1 Spray(s) every 4 hours PRN  polyethylene glycol 3350 17 Gram(s) daily  saccharomyces boulardii 250 milliGRAM(s) two times a day  senna 2 Tablet(s) at bedtime      RECENT LABS/IMAGING  CBC Full  -  ( 23 Nov 2018 06:15 )  WBC Count : 4.8 K/uL  Hemoglobin : 11.5 g/dL  Hematocrit : 34.6 %  Platelet Count - Automated : 259 K/uL  Mean Cell Volume : 100.5 fl  Mean Cell Hemoglobin : 33.4 pg  Mean Cell Hemoglobin Concentration : 33.3 gm/dL  Auto Neutrophil # : x  Auto Lymphocyte # : x  Auto Monocyte # : x  Auto Eosinophil # : x  Auto Basophil # : x  Auto Neutrophil % : x  Auto Lymphocyte % : x  Auto Monocyte % : x  Auto Eosinophil % : x  Auto Basophil % : x    11-23    140  |  105  |  16  ----------------------------<  101<H>  4.1   |  25  |  0.76    Ca    9.7      23 Nov 2018 06:15    TPro  6.4  /  Alb  3.4  /  TBili  0.4  /  DBili  x   /  AST  60<H>  /  ALT  83<H>  /  AlkPhos  77  11-23          ---------  PHYSICAL EXAM  Constitutional - NAD, Comfortable  Pulm - Breathing comfortably, No wheezing  Abd - Soft, NTND  Extremities - No edema, No calf tenderness  Neurologic Exam -                    Cognitive - Awake, Alert     Communication - Aphasia     Motor - Right hemiplegia	    ASSESSMENT/PLAN  86y Female with functional deficits after left CVA  CVA - ASA, Lipitor  UTI - Cipro  Mood/Motor Recovery - Prozac  Cardiac - Cozaar  Pain - Tylenol PRN  DVT PPX - Lovenox     Continue 3hrs a day of comprehensive rehab program.

## 2018-11-24 NOTE — PROGRESS NOTE ADULT - SUBJECTIVE AND OBJECTIVE BOX
HPI    Pt is a 85yo F admitted to acute rehab for left ICA CVA  Pt was seen and examined in bed     family at bedside, assisted with speaking Serbian to patient     eating no sob no nv no abdo pain       ros all others are negative     ICU Vital Signs Last 24 Hrs  T(C): 37.1 (24 Nov 2018 07:28), Max: 37.1 (23 Nov 2018 20:02)  T(F): 98.7 (24 Nov 2018 07:28), Max: 98.7 (23 Nov 2018 20:02)  HR: 71 (24 Nov 2018 07:28) (71 - 75)  BP: 133/73 (24 Nov 2018 07:28) (131/69 - 150/91)  BP(mean): --  ABP: --  ABP(mean): --  RR: 15 (24 Nov 2018 07:28) (15 - 16)  SpO2: 96% (24 Nov 2018 07:28) (96% - 99%)    MEDICATIONS  (STANDING):  aspirin  chewable 81 milliGRAM(s) Oral daily  atorvastatin 40 milliGRAM(s) Oral at bedtime  bisacodyl 5 milliGRAM(s) Oral at bedtime  ciprofloxacin     Tablet 250 milliGRAM(s) Oral every 12 hours  docusate sodium 100 milliGRAM(s) Oral three times a day  enoxaparin Injectable 40 milliGRAM(s) SubCutaneous daily  FLUoxetine 20 milliGRAM(s) Oral daily  losartan 100 milliGRAM(s) Oral daily  nystatin    Suspension 743437 Unit(s) Oral three times a day  polyethylene glycol 3350 17 Gram(s) Oral daily  saccharomyces boulardii 250 milliGRAM(s) Oral two times a day  senna 2 Tablet(s) Oral at bedtime    MEDICATIONS  (PRN):  acetaminophen   Tablet .. 650 milliGRAM(s) Oral every 6 hours PRN Temp greater or equal to 38C (100.4F), Mild Pain (1 - 3)  bisacodyl Suppository 10 milliGRAM(s) Rectal daily PRN Constipation                            11.5   4.8   )-----------( 259      ( 23 Nov 2018 06:15 )             34.6     11-23    140  |  105  |  16  ----------------------------<  101<H>  4.1   |  25  |  0.76    Ca    9.7      23 Nov 2018 06:15    TPro  6.4  /  Alb  3.4  /  TBili  0.4  /  DBili  x   /  AST  60<H>  /  ALT  83<H>  /  AlkPhos  77  11-23        LABS: All Labs Reviewed:                        11.6   4.9   )-----------( 250      ( 19 Nov 2018 06:15 )             34.5     11-19    138  |  106  |  21  ----------------------------<  110<H>  4.0   |  27  |  0.82    Ca    9.7      19 Nov 2018 06:15    TPro  6.4  /  Alb  3.2<L>  /  TBili  0.4  /  DBili  x   /  AST  42<H>  /  ALT  51<H>  /  AlkPhos  80  11-19          Blood Culture:     RADIOLOGY/EKG:    DVT PPX:    ADVANCED DIRECTIVE:    DISPOSITION:

## 2018-11-25 PROCEDURE — 99232 SBSQ HOSP IP/OBS MODERATE 35: CPT

## 2018-11-25 PROCEDURE — 99232 SBSQ HOSP IP/OBS MODERATE 35: CPT | Mod: GC

## 2018-11-25 RX ADMIN — Medication 1 SPRAY(S): at 13:29

## 2018-11-25 RX ADMIN — Medication 250 MILLIGRAM(S): at 18:06

## 2018-11-25 RX ADMIN — Medication 500000 UNIT(S): at 13:29

## 2018-11-25 RX ADMIN — Medication 500000 UNIT(S): at 22:04

## 2018-11-25 RX ADMIN — Medication 5 MILLIGRAM(S): at 22:04

## 2018-11-25 RX ADMIN — Medication 100 MILLIGRAM(S): at 22:04

## 2018-11-25 RX ADMIN — ATORVASTATIN CALCIUM 40 MILLIGRAM(S): 80 TABLET, FILM COATED ORAL at 22:04

## 2018-11-25 RX ADMIN — Medication 81 MILLIGRAM(S): at 12:17

## 2018-11-25 RX ADMIN — Medication 500000 UNIT(S): at 06:35

## 2018-11-25 RX ADMIN — LOSARTAN POTASSIUM 100 MILLIGRAM(S): 100 TABLET, FILM COATED ORAL at 06:35

## 2018-11-25 RX ADMIN — Medication 20 MILLIGRAM(S): at 12:17

## 2018-11-25 RX ADMIN — SENNA PLUS 2 TABLET(S): 8.6 TABLET ORAL at 22:04

## 2018-11-25 RX ADMIN — ENOXAPARIN SODIUM 40 MILLIGRAM(S): 100 INJECTION SUBCUTANEOUS at 12:17

## 2018-11-25 RX ADMIN — Medication 250 MILLIGRAM(S): at 06:35

## 2018-11-25 RX ADMIN — Medication 100 MILLIGRAM(S): at 06:35

## 2018-11-25 NOTE — PROGRESS NOTE ADULT - ASSESSMENT
HPI    Pt is a 87yo F admitted to acute rehab for left ICA CVA    *Left ICA ischemic CVA   Cont PT/OT/SLP   CAD risk management   Fluoxetine for motor improvement     *Essential HTN  Controlled  Cont Losartan     *dysphagia with aphasia  Pureed w honey thickened liquid  SLP  aspiration precautions     *DVT ppx   Lovenox     high risk for morbidity, mortality secondary to the above mentioned medical conditions   vte proph   reviewed with patient family plan of care

## 2018-11-25 NOTE — PROGRESS NOTE ADULT - ATTENDING COMMENTS
Agree with resident.  Patient seen and examined on 11/25.    Continue 3hrs a day of comprehensive rehab program.

## 2018-11-25 NOTE — PROGRESS NOTE ADULT - SUBJECTIVE AND OBJECTIVE BOX
No overnight events reported by nursing or night team.     Subjective  -  [id number] used for interview. patient with severe expressive aphasia.  However Appears comfortable.       REVIEW OF SYSTEMS limited due to aphasia   Constitutional - No fever,  +fatigue  HEENT - No vertigo, No neck pain  CV - no cp    - no dysuria   Neurological - No headaches, +loss of strength  Musculoskeletal - No joint pain, No joint swelling, No muscle pain    VITALS  T(C): 36.4 (11-25-18 @ 07:29)  T(F): 97.5 (11-25-18 @ 07:29), Max: 97.7 (11-24-18 @ 20:31)  HR: 75 (11-25-18 @ 07:29) (68 - 79)  BP: 113/71 (11-25-18 @ 07:29) (113/71 - 135/69)  RR:  (15 - 15)  SpO2:  (96% - 98%)  Wt(kg): --       MEDICATIONS  (STANDING):  aspirin  chewable 81 milliGRAM(s) Oral daily  atorvastatin 40 milliGRAM(s) Oral at bedtime  bisacodyl 5 milliGRAM(s) Oral at bedtime  ciprofloxacin     Tablet 250 milliGRAM(s) Oral every 12 hours  docusate sodium 100 milliGRAM(s) Oral three times a day  enoxaparin Injectable 40 milliGRAM(s) SubCutaneous daily  FLUoxetine 20 milliGRAM(s) Oral daily  losartan 100 milliGRAM(s) Oral daily  nystatin    Suspension 537302 Unit(s) Oral three times a day  polyethylene glycol 3350 17 Gram(s) Oral daily  saccharomyces boulardii 250 milliGRAM(s) Oral two times a day  senna 2 Tablet(s) Oral at bedtime    MEDICATIONS  (PRN):  acetaminophen   Tablet .. 650 milliGRAM(s) Oral every 6 hours PRN Temp greater or equal to 38C (100.4F), Mild Pain (1 - 3)  bisacodyl Suppository 10 milliGRAM(s) Rectal daily PRN Constipation  phenol 1.4% (CHLORASEPTIC) Oral Spray 1 Spray(s) Topical every 4 hours PRN sore throat pain      RECENT LABS/IMAGING            ---------  PHYSICAL EXAM  Constitutional - NAD, Comfortable  Pulm - Breathing comfortably, CTAB  CV - RRR no m/g/r  Abd - Soft, NTND  Extremities - No edema, No calf tenderness  Neurologic Exam -                    Cognitive - Awake, Alert     Communication - Aphasia     Motor - Right hemiplegia	    ASSESSMENT/PLAN  86y Female with functional deficits after left CVA  CVA - ASA, Lipitor  UTI - Cipro  Mood/Motor Recovery - Prozac  Cardiac - Cozaar  Pain - Tylenol PRN  DVT PPX - Lovenox     Continue 3hrs a day of comprehensive rehab program. No overnight events reported by nursing or night team.     Subjective  -  [id number] used for interview. patient with severe expressive aphasia.  However Appears comfortable.       REVIEW OF SYSTEMS limited due to aphasia   Constitutional - No fever,  +fatigue  HEENT - No vertigo, No neck pain  CV - no cp    - no dysuria   Neurological - No headaches, +loss of strength  Musculoskeletal - No joint pain, No joint swelling, No muscle pain    VITALS  T(C): 36.4 (11-25-18 @ 07:29)  T(F): 97.5 (11-25-18 @ 07:29), Max: 97.7 (11-24-18 @ 20:31)  HR: 75 (11-25-18 @ 07:29) (68 - 79)  BP: 113/71 (11-25-18 @ 07:29) (113/71 - 135/69)  RR:  (15 - 15)  SpO2:  (96% - 98%)  Wt(kg): --       MEDICATIONS  (STANDING):  aspirin  chewable 81 milliGRAM(s) Oral daily  atorvastatin 40 milliGRAM(s) Oral at bedtime  bisacodyl 5 milliGRAM(s) Oral at bedtime  ciprofloxacin     Tablet 250 milliGRAM(s) Oral every 12 hours  docusate sodium 100 milliGRAM(s) Oral three times a day  enoxaparin Injectable 40 milliGRAM(s) SubCutaneous daily  FLUoxetine 20 milliGRAM(s) Oral daily  losartan 100 milliGRAM(s) Oral daily  nystatin    Suspension 890496 Unit(s) Oral three times a day  polyethylene glycol 3350 17 Gram(s) Oral daily  saccharomyces boulardii 250 milliGRAM(s) Oral two times a day  senna 2 Tablet(s) Oral at bedtime    MEDICATIONS  (PRN):  acetaminophen   Tablet .. 650 milliGRAM(s) Oral every 6 hours PRN Temp greater or equal to 38C (100.4F), Mild Pain (1 - 3)  bisacodyl Suppository 10 milliGRAM(s) Rectal daily PRN Constipation  phenol 1.4% (CHLORASEPTIC) Oral Spray 1 Spray(s) Topical every 4 hours PRN sore throat pain      RECENT LABS/IMAGING            ---------  PHYSICAL EXAM  Constitutional - NAD, Comfortable  Pulm - Breathing comfortably, CTAB  CV - RRR no m/g/r  Abd - Soft, NTND  Extremities - No edema, No calf tenderness  Neurologic Exam -                    Cognitive - Awake, Alert     Communication - Aphasia     Motor - Right hemiplegia	    ASSESSMENT/PLAN  86y Female with functional deficits after left CVA  CVA - ASA, Lipitor  UTI - Cipro  Mood/Motor Recovery - Prozac  Cardiac - Cozaar  Pain - Tylenol PRN  DVT PPX - Lovenox

## 2018-11-26 LAB
ANION GAP SERPL CALC-SCNC: 8 MMOL/L — SIGNIFICANT CHANGE UP (ref 5–17)
BUN SERPL-MCNC: 14 MG/DL — SIGNIFICANT CHANGE UP (ref 7–23)
CALCIUM SERPL-MCNC: 9.9 MG/DL — SIGNIFICANT CHANGE UP (ref 8.4–10.5)
CHLORIDE SERPL-SCNC: 104 MMOL/L — SIGNIFICANT CHANGE UP (ref 96–108)
CO2 SERPL-SCNC: 26 MMOL/L — SIGNIFICANT CHANGE UP (ref 22–31)
CREAT SERPL-MCNC: 0.89 MG/DL — SIGNIFICANT CHANGE UP (ref 0.5–1.3)
GLUCOSE SERPL-MCNC: 110 MG/DL — HIGH (ref 70–99)
HCT VFR BLD CALC: 37.3 % — SIGNIFICANT CHANGE UP (ref 34.5–45)
HGB BLD-MCNC: 12.4 G/DL — SIGNIFICANT CHANGE UP (ref 11.5–15.5)
MCHC RBC-ENTMCNC: 33.1 PG — SIGNIFICANT CHANGE UP (ref 27–34)
MCHC RBC-ENTMCNC: 33.2 GM/DL — SIGNIFICANT CHANGE UP (ref 32–36)
MCV RBC AUTO: 99.5 FL — SIGNIFICANT CHANGE UP (ref 80–100)
PLATELET # BLD AUTO: 274 K/UL — SIGNIFICANT CHANGE UP (ref 150–400)
POTASSIUM SERPL-MCNC: 3.8 MMOL/L — SIGNIFICANT CHANGE UP (ref 3.5–5.3)
POTASSIUM SERPL-SCNC: 3.8 MMOL/L — SIGNIFICANT CHANGE UP (ref 3.5–5.3)
RBC # BLD: 3.75 M/UL — LOW (ref 3.8–5.2)
RBC # FLD: 11.8 % — SIGNIFICANT CHANGE UP (ref 10.3–14.5)
SODIUM SERPL-SCNC: 138 MMOL/L — SIGNIFICANT CHANGE UP (ref 135–145)
WBC # BLD: 4.8 K/UL — SIGNIFICANT CHANGE UP (ref 3.8–10.5)
WBC # FLD AUTO: 4.8 K/UL — SIGNIFICANT CHANGE UP (ref 3.8–10.5)

## 2018-11-26 PROCEDURE — 99232 SBSQ HOSP IP/OBS MODERATE 35: CPT

## 2018-11-26 RX ADMIN — SENNA PLUS 2 TABLET(S): 8.6 TABLET ORAL at 21:51

## 2018-11-26 RX ADMIN — Medication 250 MILLIGRAM(S): at 05:47

## 2018-11-26 RX ADMIN — Medication 100 MILLIGRAM(S): at 05:47

## 2018-11-26 RX ADMIN — Medication 500000 UNIT(S): at 21:52

## 2018-11-26 RX ADMIN — LOSARTAN POTASSIUM 100 MILLIGRAM(S): 100 TABLET, FILM COATED ORAL at 05:47

## 2018-11-26 RX ADMIN — Medication 100 MILLIGRAM(S): at 21:51

## 2018-11-26 RX ADMIN — Medication 500000 UNIT(S): at 13:11

## 2018-11-26 RX ADMIN — Medication 1 SPRAY(S): at 13:11

## 2018-11-26 RX ADMIN — Medication 5 MILLIGRAM(S): at 21:51

## 2018-11-26 RX ADMIN — Medication 250 MILLIGRAM(S): at 17:31

## 2018-11-26 RX ADMIN — ATORVASTATIN CALCIUM 40 MILLIGRAM(S): 80 TABLET, FILM COATED ORAL at 21:51

## 2018-11-26 RX ADMIN — Medication 250 MILLIGRAM(S): at 17:30

## 2018-11-26 RX ADMIN — Medication 81 MILLIGRAM(S): at 12:20

## 2018-11-26 RX ADMIN — Medication 100 MILLIGRAM(S): at 13:11

## 2018-11-26 RX ADMIN — Medication 20 MILLIGRAM(S): at 12:20

## 2018-11-26 RX ADMIN — Medication 500000 UNIT(S): at 05:47

## 2018-11-26 RX ADMIN — ENOXAPARIN SODIUM 40 MILLIGRAM(S): 100 INJECTION SUBCUTANEOUS at 12:20

## 2018-11-26 NOTE — PROGRESS NOTE ADULT - ASSESSMENT
ASSESSMENT/PLAN  86 year-old woman with a PMH of HTN found to have left ICA (MCA + CYNDEE) stroke s/p tPA  with dense right HP, global aphasia, dysphagia, hemisensory deficits    # Left ICA stroke unknown etiology- with global aphasia and functional quadriplegia  -Aspirin, Statin discontinued as stroke not thought to be of atherosclerotic origin  -continue Fluoxetine  -Continue OT, PT, SLP services    #Cardiovascular  - no PFO on ECHO, EF 74%, HTN  -Losartan.      #r/o UTI, +Enterobacter on Cx  - cipro 250 q12 11/23 (day #4/5)  no leukocytosis or fever, BUn/Cr stable  -now asymptomatic, afebrile, no leukocytosis    #GI-  -Decrease Tylenol dose to 325mg prn. Trend LFTs-stable   -Colace and Senna changed from PRN to standing orders    -# DIET  upgarded to mechanical soft and honey thickened liquids    #Code Status: As per meeting with Palliative Team at [facility]  pt is DNR/DNI,  TRIAL OF NG TUBE IF BECOMES DYSPHASIC, IV FLUIDS AS NEEDED,  ANTIBIOTICS FOR INFECTION, RETURN TO HOSPITAL, FULL MEDICAL MANAGEMENT SHORT OF INTUBATION AND RESUSCITATION  -f/u MOLST form      # For IDT rounds   patient motivated, family involved and will be supportive on dc. Goals for min-mod assist, likely wheelchair level on dc, needs 24 hours assistance and supervision due to plegia and aphasia. caregiver training,  - target dc home 12/6/18 with home PT OT, SLP referral      LABS:    CBC BMP 11/29

## 2018-11-26 NOTE — PROGRESS NOTE ADULT - SUBJECTIVE AND OBJECTIVE BOX
Patient is a 86y old  Female who presents with a chief complaint of CVA right HP , aphasia fu (25 Nov 2018 11:16)      HPI:  86 year old woman RH dominant with multiple vascular risk factors, including age and HTN was transferred from [facility] to [facility] for acute onset of right-sided weakness. She was noted to have developed acute onset of right-sided weakness and language disturbance. She was initially evaluated at [facility] through tele-stroke and was treated with IV tPA (Admission NIHSS 22). MRI brain showed left CYNDEE and MCA distribution infarct with mild hemorrhagic transformation (HI 1). 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    During hospital stay was neurologically without acute change: remains with global aphasia, right hemiplegia . Permissive hypertension was slowly titrated to normotension. Course was complicated by urinary retention which was managed with PRN straight catheterization. MBS was performed and patient was placed on a Dysphagia 1 honey diet which has been well-tolerated. Lipitor discontinued due to admission LDL of 104. Family meeting to discuss goals of care done on 11/8 2pm, family decided code status DNR/DNI. Discussed and completed MOLST form. Briefly,  pt is DNR/DNI,  TRIAL OF NG TUBE IF BECOMES DYSPHASIC, IV FLUIDS AS NEEDED,  ANTIBIOTICS FOR INFECTION, RETURN TO HOSPITAL, FULL MEDICAL MANAGEMENT SHORT OF INTUBATION AND RESUSCITATION. Cardiac monitoring revealed no events. TTE shows EF 74% no evidence of thrombus or PFO.  Deemed medically stable for acute rehab. (15 Nov 2018 16:23)      PAST MEDICAL & SURGICAL HISTORY:  HTN (hypertension)  Hypertension  S/P appendectomy      MEDICATIONS  (STANDING):  aspirin  chewable 81 milliGRAM(s) Oral daily  atorvastatin 40 milliGRAM(s) Oral at bedtime  bisacodyl 5 milliGRAM(s) Oral at bedtime  ciprofloxacin     Tablet 250 milliGRAM(s) Oral every 12 hours  docusate sodium 100 milliGRAM(s) Oral three times a day  enoxaparin Injectable 40 milliGRAM(s) SubCutaneous daily  FLUoxetine 20 milliGRAM(s) Oral daily  losartan 100 milliGRAM(s) Oral daily  nystatin    Suspension 223807 Unit(s) Oral three times a day  polyethylene glycol 3350 17 Gram(s) Oral daily  saccharomyces boulardii 250 milliGRAM(s) Oral two times a day  senna 2 Tablet(s) Oral at bedtime    MEDICATIONS  (PRN):  acetaminophen   Tablet .. 650 milliGRAM(s) Oral every 6 hours PRN Temp greater or equal to 38C (100.4F), Mild Pain (1 - 3)  bisacodyl Suppository 10 milliGRAM(s) Rectal daily PRN Constipation  phenol 1.4% (CHLORASEPTIC) Oral Spray 1 Spray(s) Topical every 4 hours PRN sore throat pain      Allergies    No Known Allergies    Intolerances        TODAY'S SUBJECTIVE & REVIEW OF SYMPTOMS:    Patient seen with family at bedside. improved attention., looks more "present" also, yes/no with hand gestures much more accurate and reliable. Denies H/A, difficulty sleeping. No constipation. NO further urinary symptoms or lower abdominal pain. Tolerating upgraded diet    PHYSICAL EXAM  86y  Vital Signs Last 24 Hrs  T(C): 36.4 (26 Nov 2018 07:27), Max: 36.7 (25 Nov 2018 20:02)  T(F): 97.6 (26 Nov 2018 07:27), Max: 98 (25 Nov 2018 20:02)  HR: 89 (26 Nov 2018 07:27) (71 - 89)  BP: 147/84 (26 Nov 2018 07:27) (128/67 - 147/84)  BP(mean): --  RR: 15 (26 Nov 2018 07:27) (15 - 15)  SpO2: 94% (26 Nov 2018 07:27) (94% - 96%)  Daily     Daily     General: alert eyes open, mood stable NAD +global aphasia  follows simple commands, improved    Resp: clear bilaterally no R/R/W  Cardio: Nl S1 and S2 regular  Abdomen: +BS sof,t NT no suprapubic pain  Extremities: no calf swelling or pedal edema  0/5 AROM  right HF and quad spontaneous movement 3-/5  no clonus, no pain with PROM        RECENT LABS:                          12.4   4.8   )-----------( 274      ( 26 Nov 2018 07:30 )             37.3     11-26    138  |  104  |  14  ----------------------------<  110<H>  3.8   |  26  |  0.89    Ca    9.9      26 Nov 2018 07:30                CAPILLARY BLOOD GLUCOSE        IMPRESSION AND PLAN:

## 2018-11-27 PROCEDURE — 99232 SBSQ HOSP IP/OBS MODERATE 35: CPT

## 2018-11-27 RX ORDER — CIPROFLOXACIN LACTATE 400MG/40ML
250 VIAL (ML) INTRAVENOUS EVERY 12 HOURS
Qty: 0 | Refills: 0 | Status: COMPLETED | OUTPATIENT
Start: 2018-11-27 | End: 2018-11-28

## 2018-11-27 RX ADMIN — Medication 100 MILLIGRAM(S): at 05:38

## 2018-11-27 RX ADMIN — Medication 5 MILLIGRAM(S): at 22:17

## 2018-11-27 RX ADMIN — Medication 250 MILLIGRAM(S): at 17:34

## 2018-11-27 RX ADMIN — LOSARTAN POTASSIUM 100 MILLIGRAM(S): 100 TABLET, FILM COATED ORAL at 05:38

## 2018-11-27 RX ADMIN — Medication 250 MILLIGRAM(S): at 05:38

## 2018-11-27 RX ADMIN — Medication 100 MILLIGRAM(S): at 15:10

## 2018-11-27 RX ADMIN — Medication 20 MILLIGRAM(S): at 12:44

## 2018-11-27 RX ADMIN — Medication 500000 UNIT(S): at 15:11

## 2018-11-27 RX ADMIN — ATORVASTATIN CALCIUM 40 MILLIGRAM(S): 80 TABLET, FILM COATED ORAL at 22:17

## 2018-11-27 RX ADMIN — Medication 500000 UNIT(S): at 22:17

## 2018-11-27 RX ADMIN — ENOXAPARIN SODIUM 40 MILLIGRAM(S): 100 INJECTION SUBCUTANEOUS at 12:44

## 2018-11-27 RX ADMIN — Medication 500000 UNIT(S): at 05:38

## 2018-11-27 RX ADMIN — Medication 81 MILLIGRAM(S): at 12:44

## 2018-11-27 NOTE — PROGRESS NOTE ADULT - ASSESSMENT
86 year-old woman with a PMH of HTN found to have left ICA (MCA + CYNDEE) stroke s/p tPA  with dense right HP, global aphasia, dysphagia, hemisensory deficits-OT, PT, SLP    # Left ICA stroke unknown etiology--Aspirin,   -Fluoxetine for motor recovery after stroke     Cardiovascular- no PFO on ECHO, EF 74%,   HTN- Losartan      uti- cipro    hld- statin

## 2018-11-27 NOTE — PROGRESS NOTE ADULT - SUBJECTIVE AND OBJECTIVE BOX
Patient is a 86y old  Female who presents with a chief complaint of CVA right HP , aphasia (27 Nov 2018 10:42)      HPI:  86 year old woman RH dominant with multiple vascular risk factors, including age and HTN was transferred from [facility] to [facility] for acute onset of right-sided weakness. She was noted to have developed acute onset of right-sided weakness and language disturbance. She was initially evaluated at [facility] through tele-stroke and was treated with IV tPA (Admission NIHSS 22). MRI brain showed left CYNDEE and MCA distribution infarct with mild hemorrhagic transformation (HI 1). 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    During hospital stay was neurologically without acute change: remains with global aphasia, right hemiplegia . Permissive hypertension was slowly titrated to normotension. Course was complicated by urinary retention which was managed with PRN straight catheterization. MBS was performed and patient was placed on a Dysphagia 1 honey diet which has been well-tolerated. Lipitor discontinued due to admission LDL of 104. Family meeting to discuss goals of care done on 11/8 2pm, family decided code status DNR/DNI. Discussed and completed MOLST form. Briefly,  pt is DNR/DNI,  TRIAL OF NG TUBE IF BECOMES DYSPHASIC, IV FLUIDS AS NEEDED,  ANTIBIOTICS FOR INFECTION, RETURN TO HOSPITAL, FULL MEDICAL MANAGEMENT SHORT OF INTUBATION AND RESUSCITATION. Cardiac monitoring revealed no events. TTE shows EF 74% no evidence of thrombus or PFO.  Deemed medically stable for acute rehab. (15 Nov 2018 16:23)      PAST MEDICAL & SURGICAL HISTORY:  HTN (hypertension)  Hypertension  S/P appendectomy      MEDICATIONS  (STANDING):  aspirin  chewable 81 milliGRAM(s) Oral daily  atorvastatin 40 milliGRAM(s) Oral at bedtime  bisacodyl 5 milliGRAM(s) Oral at bedtime  ciprofloxacin     Tablet 250 milliGRAM(s) Oral every 12 hours  docusate sodium 100 milliGRAM(s) Oral three times a day  enoxaparin Injectable 40 milliGRAM(s) SubCutaneous daily  FLUoxetine 20 milliGRAM(s) Oral daily  losartan 100 milliGRAM(s) Oral daily  nystatin    Suspension 929760 Unit(s) Oral three times a day  polyethylene glycol 3350 17 Gram(s) Oral daily  saccharomyces boulardii 250 milliGRAM(s) Oral two times a day  senna 2 Tablet(s) Oral at bedtime    MEDICATIONS  (PRN):  acetaminophen   Tablet .. 650 milliGRAM(s) Oral every 6 hours PRN Temp greater or equal to 38C (100.4F), Mild Pain (1 - 3)  bisacodyl Suppository 10 milliGRAM(s) Rectal daily PRN Constipation  phenol 1.4% (CHLORASEPTIC) Oral Spray 1 Spray(s) Topical every 4 hours PRN sore throat pain      Allergies    No Known Allergies    Intolerances        TODAY'S SUBJECTIVE & REVIEW OF SYMPTOMS:    Patient today looks more distracted, although family is not present. Seen with assistance language line  in [place] #[id number]. Patient today reports dysuria and lower abdominal pain, wchih she did not yesterday. asked repeatedly, and continues to answer "yes." Denies H/A. no fever    PHYSICAL EXAM  86y  Vital Signs Last 24 Hrs  T(C): 36.7 (27 Nov 2018 08:29), Max: 36.7 (26 Nov 2018 19:50)  T(F): 98.1 (27 Nov 2018 08:29), Max: 98.1 (26 Nov 2018 19:50)  HR: 81 (27 Nov 2018 08:29) (71 - 81)  BP: 139/65 (27 Nov 2018 08:29) (115/69 - 139/65)  BP(mean): --  RR: 14 (27 Nov 2018 08:29) (14 - 14)  SpO2: 94% (27 Nov 2018 08:29) (94% - 98%)  Daily     Daily     General: alert no agitation or restlessness +globally aphasic. minimal word production. +vocalization attempts  scans right and left side environemnt    Resp: clear bilaterally no R?R/W  Cardio: RRR HR 81  Abdomen: +Bs soft, NT  Extremities: no calf swelilng +soft, NT no erythema or warmth  flexor tone RLE no clonus  no grimacing with PROm right LE        RECENT LABS:                          12.4   4.8   )-----------( 274      ( 26 Nov 2018 07:30 )             37.3     11-26    138  |  104  |  14  ----------------------------<  110<H>  3.8   |  26  |  0.89    Ca    9.9      26 Nov 2018 07:30                CAPILLARY BLOOD GLUCOSE        IMPRESSION AND PLAN:

## 2018-11-27 NOTE — PROGRESS NOTE ADULT - ASSESSMENT
ASSESSMENT/PLAN  86 year-old woman with a PMH of HTN found to have left ICA (MCA + CYNDEE) stroke s/p tPA  with dense right HP, global aphasia, dysphagia, hemisensory deficits    # Left ICA stroke unknown etiology- with global aphasia and functional quadriplegia  -Aspirin, Statin discontinued as stroke not thought to be of atherosclerotic origin  -continue Fluoxetine  -Continue OT, PT, SLP services    #Cardiovascular  - no PFO on ECHO, EF 74%, HTN  -Losartan.      #r/o UTI, +Enterobacter on Cx  - cipro 250 q12 11/23 (day #5/5)  no leukocytosis or fever, BUn/Cr stable  -today patient reoprts dysuria. Afebrile, CBC yesterday stable. Complete course of Abx, if persists, will repeat UA  -toileting program    #GI-  -Decrease Tylenol dose to 325mg prn. Trend LFTs-stable   -Colace and Senna changed from PRN to standing orders    -# DIET  upgarded to mechanical soft and honey thickened liquids    #Code Status: As per meeting with Palliative Team at [facility]  pt is DNR/DNI,  TRIAL OF NG TUBE IF BECOMES DYSPHASIC, IV FLUIDS AS NEEDED,  ANTIBIOTICS FOR INFECTION, RETURN TO HOSPITAL, FULL MEDICAL MANAGEMENT SHORT OF INTUBATION AND RESUSCITATION  -f/u MOLST form      # Case discussed in IDT rounds   Patient has some minimal gains in writing in SLP, continues to require mod assist for transfers, and has developed some icnreased tonicity right side. Due to h/o elevated LFTs and cost of medications on dc, work aggressively on positioning, stretch, modalities, family education. If spasticity worsens, will consider medication after discussion with family  -target dc home 12/7/18 with caregiver assistance min-mod level on dc  -evaluation for home modification as patient will be wheelchair level  -caregiver training      LABS:    CBC BMP 11/29

## 2018-11-27 NOTE — PROGRESS NOTE ADULT - SUBJECTIVE AND OBJECTIVE BOX
86y old  Female who presents with a chief complaint of CVA, seen at the bedside, no new complaints per staff no n/v, no sob    Vital Signs Last 24 Hrs  T(C): 36.7 (27 Nov 2018 08:29), Max: 36.7 (26 Nov 2018 19:50)  T(F): 98.1 (27 Nov 2018 08:29), Max: 98.1 (26 Nov 2018 19:50)  HR: 81 (27 Nov 2018 08:29) (71 - 81)  BP: 139/65 (27 Nov 2018 08:29) (115/69 - 139/65)  BP(mean): --  RR: 14 (27 Nov 2018 08:29) (14 - 14)  SpO2: 94% (27 Nov 2018 08:29) (94% - 98%)      NAD, RUSS, MMM, NCAT  SUPPLE  CLEAR  S1S2  SOFT NT BS PRESENT,peg present  NO PEDAL EDEMA  aphasia  right hemiparesis    labs reviewed              12.4                 138  | 26   | 14           4.8   >-----------< 274     ------------------------< 110                   37.3                 3.8  | 104  | 0.89                                         Ca 9.9   Mg x     Ph x

## 2018-11-28 PROCEDURE — 99232 SBSQ HOSP IP/OBS MODERATE 35: CPT

## 2018-11-28 PROCEDURE — 99233 SBSQ HOSP IP/OBS HIGH 50: CPT | Mod: GC

## 2018-11-28 RX ORDER — CIPROFLOXACIN LACTATE 400MG/40ML
250 VIAL (ML) INTRAVENOUS EVERY 12 HOURS
Qty: 0 | Refills: 0 | Status: COMPLETED | OUTPATIENT
Start: 2018-11-28 | End: 2018-11-30

## 2018-11-28 RX ADMIN — ENOXAPARIN SODIUM 40 MILLIGRAM(S): 100 INJECTION SUBCUTANEOUS at 12:30

## 2018-11-28 RX ADMIN — Medication 250 MILLIGRAM(S): at 19:09

## 2018-11-28 RX ADMIN — Medication 100 MILLIGRAM(S): at 06:48

## 2018-11-28 RX ADMIN — Medication 100 MILLIGRAM(S): at 21:39

## 2018-11-28 RX ADMIN — Medication 100 MILLIGRAM(S): at 12:30

## 2018-11-28 RX ADMIN — SENNA PLUS 2 TABLET(S): 8.6 TABLET ORAL at 21:40

## 2018-11-28 RX ADMIN — Medication 81 MILLIGRAM(S): at 12:30

## 2018-11-28 RX ADMIN — LOSARTAN POTASSIUM 100 MILLIGRAM(S): 100 TABLET, FILM COATED ORAL at 06:47

## 2018-11-28 RX ADMIN — Medication 250 MILLIGRAM(S): at 06:47

## 2018-11-28 RX ADMIN — Medication 5 MILLIGRAM(S): at 21:39

## 2018-11-28 RX ADMIN — ATORVASTATIN CALCIUM 40 MILLIGRAM(S): 80 TABLET, FILM COATED ORAL at 21:39

## 2018-11-28 RX ADMIN — Medication 20 MILLIGRAM(S): at 12:30

## 2018-11-28 NOTE — PROGRESS NOTE ADULT - SUBJECTIVE AND OBJECTIVE BOX
Patient is a 86y old  Female who presents with a chief complaint of CVA right HP , aphasia (27 Nov 2018 13:36)      HPI:  86 year old woman RH dominant with multiple vascular risk factors, including age and HTN was transferred from [facility] to [facility] for acute onset of right-sided weakness. She was noted to have developed acute onset of right-sided weakness and language disturbance. She was initially evaluated at [facility] through tele-stroke and was treated with IV tPA (Admission NIHSS 22). MRI brain showed left CYNDEE and MCA distribution infarct with mild hemorrhagic transformation (HI 1). 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    During hospital stay was neurologically without acute change: remains with global aphasia, right hemiplegia . Permissive hypertension was slowly titrated to normotension. Course was complicated by urinary retention which was managed with PRN straight catheterization. MBS was performed and patient was placed on a Dysphagia 1 honey diet which has been well-tolerated. Lipitor discontinued due to admission LDL of 104. Family meeting to discuss goals of care done on 11/8 2pm, family decided code status DNR/DNI. Discussed and completed MOLST form. Briefly,  pt is DNR/DNI,  TRIAL OF NG TUBE IF BECOMES DYSPHASIC, IV FLUIDS AS NEEDED,  ANTIBIOTICS FOR INFECTION, RETURN TO HOSPITAL, FULL MEDICAL MANAGEMENT SHORT OF INTUBATION AND RESUSCITATION. Cardiac monitoring revealed no events. TTE shows EF 74% no evidence of thrombus or PFO.  Deemed medically stable for acute rehab. (15 Nov 2018 16:23)      PAST MEDICAL & SURGICAL HISTORY:  HTN (hypertension)  Hypertension  S/P appendectomy      MEDICATIONS  (STANDING):  aspirin  chewable 81 milliGRAM(s) Oral daily  atorvastatin 40 milliGRAM(s) Oral at bedtime  bisacodyl 5 milliGRAM(s) Oral at bedtime  docusate sodium 100 milliGRAM(s) Oral three times a day  enoxaparin Injectable 40 milliGRAM(s) SubCutaneous daily  FLUoxetine 20 milliGRAM(s) Oral daily  losartan 100 milliGRAM(s) Oral daily  nystatin    Suspension 030255 Unit(s) Oral three times a day  polyethylene glycol 3350 17 Gram(s) Oral daily  saccharomyces boulardii 250 milliGRAM(s) Oral two times a day  senna 2 Tablet(s) Oral at bedtime    MEDICATIONS  (PRN):  acetaminophen   Tablet .. 650 milliGRAM(s) Oral every 6 hours PRN Temp greater or equal to 38C (100.4F), Mild Pain (1 - 3)  bisacodyl Suppository 10 milliGRAM(s) Rectal daily PRN Constipation  phenol 1.4% (CHLORASEPTIC) Oral Spray 1 Spray(s) Topical every 4 hours PRN sore throat pain      Allergies    No Known Allergies    Intolerances        TODAY'S SUBJECTIVE & REVIEW OF SYMPTOMS:    Patient stable, seen with language melanie  in [place] [employer] #[id number]. Patient still inconsistent use of yes/no resonses but slightly better than yesterday. Vocalizations are perseverative and echolalic, mostly with "yes" responses. Denies H/A, extremity pain. continued abdominal pain and pain with urination but less. +constipation    PHYSICAL EXAM  86y  Vital Signs Last 24 Hrs  T(C): 36.7 (28 Nov 2018 08:52), Max: 36.7 (27 Nov 2018 20:40)  T(F): 98.1 (28 Nov 2018 08:52), Max: 98.1 (27 Nov 2018 20:40)  HR: 79 (28 Nov 2018 08:52) (76 - 81)  BP: 149/69 (28 Nov 2018 08:52) (128/71 - 167/77)  BP(mean): --  RR: 14 (28 Nov 2018 08:52) (14 - 14)  SpO2: 96% (28 Nov 2018 08:52) (81% - 97%)  Daily     Daily     General: alert, sitting in wheelchair, eating, good appetite NAD    Resp: claer bilaterally no R./R/W  Cardio: Nl S1 and S2 regular  Abdomen:  +BS soft, NT ND no R/G no suprapubic discomfot (iomproved from yesterday_  Extremities: no calf swelling +soft, no erythema or warmth  Neuro: +dysarthria  Skin: intact      RECENT LABS:                      CAPILLARY BLOOD GLUCOSE        IMPRESSION AND PLAN:

## 2018-11-28 NOTE — CHART NOTE - NSCHARTNOTEFT_GEN_A_CORE
Nutrition Follow Up   Hospital Course (Per Electronic Medical Record):   Source: Patient [ ]    Family [ ]     Medical Record [X]     Diet: Low Sodium Mechanical Soft (Dysphagia 2) Diet w/ Honey Thick Liquids Upgraded on 11/20  Tolerates Diet Well  Consumes % of Meals (as Per Documentation)     Enteral /Parenteral Nutrition: N/A    Current Weight: 128.3lb 11/15  % Weight Change: N/A  Obtain New Weight   Obtain Weights Weekly     Pertinent Medications: MEDICATIONS  (STANDING):  aspirin  chewable 81 milliGRAM(s) Oral daily  atorvastatin 40 milliGRAM(s) Oral at bedtime  bisacodyl 5 milliGRAM(s) Oral at bedtime  ciprofloxacin     Tablet 250 milliGRAM(s) Oral every 12 hours  docusate sodium 100 milliGRAM(s) Oral three times a day  enoxaparin Injectable 40 milliGRAM(s) SubCutaneous daily  FLUoxetine 20 milliGRAM(s) Oral daily  losartan 100 milliGRAM(s) Oral daily  nystatin    Suspension 311608 Unit(s) Oral three times a day  polyethylene glycol 3350 17 Gram(s) Oral daily  saccharomyces boulardii 250 milliGRAM(s) Oral two times a day  senna 2 Tablet(s) Oral at bedtime    MEDICATIONS  (PRN):  acetaminophen   Tablet .. 650 milliGRAM(s) Oral every 6 hours PRN Temp greater or equal to 38C (100.4F), Mild Pain (1 - 3)  bisacodyl Suppository 10 milliGRAM(s) Rectal daily PRN Constipation  phenol 1.4% (CHLORASEPTIC) Oral Spray 1 Spray(s) Topical every 4 hours PRN sore throat pain    Pertinent Labs:  11-26 Na138 mmol/L Glu 110 mg/dL<H> K+ 3.8 mmol/L Cr  0.89 mg/dL BUN 14 mg/dL 11-23 Alb 3.4 g/dL 11-16 PAB 18 mg/dL<L> 11-03 [id number] 5.4 % 11-03 Chol 169 mg/dL  mg/dL HDL 54 mg/dL Trig 57 mg/dL    Skin: No Pressure Ulcers     Edema: None Noned    Last BM: 11/27    Estimated Needs:   [X] No Change since Previous Assessment  [ ] Recalculated:     Previous Nutrition Diagnosis:   Chewing/Swallowing Difficulties     Nutrition Diagnosis is [X] Ongoing  [ ] Resolved [ ] Not Applicable   Continues on Mechanical Soft (Dysphagia 2) Diet w/ Honey Thick Liquids      New Nutrition Diagnosis: [X] Not Applicable    Interventions:   1. Recommend Continue Nutrition Plan of Care     Monitoring & Evaluation:   [X] PO Intake   [X] Tolerance to Diet Prescription   [X] Weights   [X] Follow Up (Per Protocol)  [X] Other: Labs     RD Remains Available.  Parviz Hayden RD Nutrition Follow Up   Hospital Course (Per Electronic Medical Record):   Source: Patient [ ]    Family [ ]     Medical Record [X]     Diet: Low Sodium Mechanical Soft (Dysphagia 2) Diet w/ Honey Thick Liquids Upgraded on 11/20  Tolerates Diet Well  Consumes % of Meals (as Per Documentation)     Enteral /Parenteral Nutrition: N/A    Current Weight: 128.3lb 11/15  % Weight Change: N/A  Obtain New Weight   Obtain Weights Weekly     Pertinent Medications: MEDICATIONS  (STANDING):  aspirin  chewable 81 milliGRAM(s) Oral daily  atorvastatin 40 milliGRAM(s) Oral at bedtime  bisacodyl 5 milliGRAM(s) Oral at bedtime  ciprofloxacin     Tablet 250 milliGRAM(s) Oral every 12 hours  docusate sodium 100 milliGRAM(s) Oral three times a day  enoxaparin Injectable 40 milliGRAM(s) SubCutaneous daily  FLUoxetine 20 milliGRAM(s) Oral daily  losartan 100 milliGRAM(s) Oral daily  nystatin    Suspension 042307 Unit(s) Oral three times a day  polyethylene glycol 3350 17 Gram(s) Oral daily  saccharomyces boulardii 250 milliGRAM(s) Oral two times a day  senna 2 Tablet(s) Oral at bedtime    MEDICATIONS  (PRN):  acetaminophen   Tablet .. 650 milliGRAM(s) Oral every 6 hours PRN Temp greater or equal to 38C (100.4F), Mild Pain (1 - 3)  bisacodyl Suppository 10 milliGRAM(s) Rectal daily PRN Constipation  phenol 1.4% (CHLORASEPTIC) Oral Spray 1 Spray(s) Topical every 4 hours PRN sore throat pain    Pertinent Labs:  11-26 Na138 mmol/L Glu 110 mg/dL<H> K+ 3.8 mmol/L Cr  0.89 mg/dL BUN 14 mg/dL 11-23 Alb 3.4 g/dL 11-16 PAB 18 mg/dL<L> 11-03 [id number] 5.4 % 11-03 Chol 169 mg/dL  mg/dL HDL 54 mg/dL Trig 57 mg/dL    Skin: No Pressure Ulcers     Edema: None Noted    Last BM: 11/27    Estimated Needs:   [X] No Change since Previous Assessment  [ ] Recalculated:     Previous Nutrition Diagnosis:   Chewing/Swallowing Difficulties     Nutrition Diagnosis is [X] Ongoing  [ ] Resolved [ ] Not Applicable   Continues on Mechanical Soft (Dysphagia 2) Diet w/ Honey Thick Liquids      New Nutrition Diagnosis: [X] Not Applicable    Interventions:   1. Recommend Continue Nutrition Plan of Care     Monitoring & Evaluation:   [X] PO Intake   [X] Tolerance to Diet Prescription   [X] Weights   [X] Follow Up (Per Protocol)  [X] Other: Labs     RD Remains Available.  Parviz Hayden RD

## 2018-11-28 NOTE — PROGRESS NOTE ADULT - SUBJECTIVE AND OBJECTIVE BOX
HPI  Pt is a 87yo F admitted to acute rehab for left ICA CVA  Pt was seen and examined at bedside. Pt is nonverbal. Able to make gesture for yes and no.     Vital Signs Last 24 Hrs  T(C): 36.7 (28 Nov 2018 08:52), Max: 36.7 (27 Nov 2018 20:40)  T(F): 98.1 (28 Nov 2018 08:52), Max: 98.1 (27 Nov 2018 20:40)  HR: 79 (28 Nov 2018 08:52) (76 - 81)  BP: 149/69 (28 Nov 2018 08:52) (128/71 - 167/77)  BP(mean): --  RR: 14 (28 Nov 2018 08:52) (14 - 14)  SpO2: 96% (28 Nov 2018 08:52) (81% - 97%)    I&O's Summary      CAPILLARY BLOOD GLUCOSE    PHYSICAL EXAM:    Constitutional: NAD, awake and alert,  Neck: Soft   Respiratory: Breath sounds are clear bilaterally,  Cardiovascular: S1 and S2,   Gastrointestinal: Bowel Sounds present, soft,   Extremities: No peripheral edema  Neurological: aphasic, non verbal  Musculoskeletal: RUE flasid. RLE move minimal per son   Skin: No rashes    MEDICATIONS:  MEDICATIONS  (STANDING):  aspirin  chewable 81 milliGRAM(s) Oral daily  atorvastatin 40 milliGRAM(s) Oral at bedtime  bisacodyl 5 milliGRAM(s) Oral at bedtime  ciprofloxacin     Tablet 250 milliGRAM(s) Oral every 12 hours  docusate sodium 100 milliGRAM(s) Oral three times a day  enoxaparin Injectable 40 milliGRAM(s) SubCutaneous daily  FLUoxetine 20 milliGRAM(s) Oral daily  losartan 100 milliGRAM(s) Oral daily  nystatin    Suspension 975244 Unit(s) Oral three times a day  polyethylene glycol 3350 17 Gram(s) Oral daily  saccharomyces boulardii 250 milliGRAM(s) Oral two times a day  senna 2 Tablet(s) Oral at bedtime      LABS: All Labs Reviewed:                Blood Culture:     RADIOLOGY/EKG:    DVT PPX:    ADVANCED DIRECTIVE:    DISPOSITION:

## 2018-11-28 NOTE — PROGRESS NOTE ADULT - ASSESSMENT
HPI  Pt is a 85yo F admitted to acute rehab for left ICA CVA    *Left ICA CVA   Admit to acute rehab  Cont PT/OT/SLP   Cont asa  Fluoxetine for motor improvement     *Dysphagia  Cont SLP  Mechanical soft w honey liquid     *HTN  Controlled  One isolated episode of HTN  Cont Losartan     *UTI  +Enterobacter on CX  Cont Cipro for total 7 days  If symptom persist, repeat UA     *dysphagia with aphasia  Pureed w honey thickened liquid  SLP    *Constipation   Cont Bowel regimen standing dose     *DVT ppx   Lovenox

## 2018-11-28 NOTE — PROGRESS NOTE ADULT - ASSESSMENT
ASSESSMENT/PLAN  86 year-old woman with a PMH of HTN found to have left ICA (MCA + CYNDEE) stroke s/p tPA  with dense right HP, global aphasia, dysphagia, hemisensory deficits    # Left ICA stroke unknown etiology- with global aphasia and functional quadriplegia  -Aspirin, Statin discontinued as stroke not thought to be of atherosclerotic origin  -continue Fluoxetine  -Continue OT, PT, SLP services  -add recreational therapies    #Cardiovascular  - no PFO on ECHO, EF 74%, HTN  -Losartan.    #r/o UTI, +Enterobacter on Cx  no leukocytosis or fever, BUn/Cr stable  -discussed with hospitalist. given persistent symptoms, recommend continuing for SEVEN day course Abx    #GI-  -Decrease Tylenol dose to 325mg prn. Trend LFTs-stable   -Colace and Senna changed from PRN to standing orders    -# DIET  upgarded to mechanical soft and honey thickened liquids    #Code Status: As per meeting with Palliative Team at [facility]  pt is DNR/DNI,  TRIAL OF NG TUBE IF BECOMES DYSPHASIC, IV FLUIDS AS NEEDED,  ANTIBIOTICS FOR INFECTION, RETURN TO HOSPITAL, FULL MEDICAL MANAGEMENT SHORT OF INTUBATION AND RESUSCITATION  -f/u MOLST form      # Case discussed in IDT rounds   Patient has some minimal gains in writing in SLP, continues to require mod assist for transfers, and has developed some icnreased tonicity right side. Due to h/o elevated LFTs and cost of medications on dc, work aggressively on positioning, stretch, modalities, family education. If spasticity worsens, will consider medication after discussion with family  -target dc home 12/7/18 with caregiver assistance min-mod level on dc  -evaluation for home modification as patient will be wheelchair level  -caregiver training      LABS:    CBC BMP 11/29 ASSESSMENT/PLAN  86 year-old woman with a PMH of HTN found to have left ICA (MCA + CYNDEE) stroke s/p tPA  with dense right HP, global aphasia, dysphagia, hemisensory deficits    # Left ICA stroke unknown etiology- with global aphasia and functional quadriplegia  -Aspirin, Statin discontinued as stroke not thought to be of atherosclerotic origin  -continue Fluoxetine  -Continue OT, PT, SLP services  -add recreational therapies  -resting hand splint ordered for tone    #Cardiovascular  - no PFO on ECHO, EF 74%, HTN  -Losartan.    #r/o UTI, +Enterobacter on Cx  no leukocytosis or fever, BUn/Cr stable  -discussed with hospitalist. given persistent symptoms, recommend continuing for SEVEN day course Abx    #GI-  -Decrease Tylenol dose to 325mg prn. Trend LFTs-stable   -Colace and Senna changed from PRN to standing orders    -# DIET  upgarded to mechanical soft and honey thickened liquids    #Code Status: As per meeting with Palliative Team at [facility]  pt is DNR/DNI,  TRIAL OF NG TUBE IF BECOMES DYSPHASIC, IV FLUIDS AS NEEDED,  ANTIBIOTICS FOR INFECTION, RETURN TO HOSPITAL, FULL MEDICAL MANAGEMENT SHORT OF INTUBATION AND RESUSCITATION  -f/u MOLST form      # Case discussed in IDT rounds   Patient has some minimal gains in writing in SLP, continues to require mod assist for transfers, and has developed some icnreased tonicity right side. Due to h/o elevated LFTs and cost of medications on dc, work aggressively on positioning, stretch, modalities, family education. If spasticity worsens, will consider medication after discussion with family  -target dc home 12/7/18 with caregiver assistance min-mod level on dc  -evaluation for home modification as patient will be wheelchair level  -caregiver training      LABS:    CBC BMP 11/29

## 2018-11-29 LAB
ANION GAP SERPL CALC-SCNC: 8 MMOL/L — SIGNIFICANT CHANGE UP (ref 5–17)
APPEARANCE UR: CLEAR — SIGNIFICANT CHANGE UP
BACTERIA # UR AUTO: NEGATIVE /HPF — SIGNIFICANT CHANGE UP
BILIRUB UR-MCNC: ABNORMAL
BUN SERPL-MCNC: 12 MG/DL — SIGNIFICANT CHANGE UP (ref 7–23)
CALCIUM SERPL-MCNC: 9.7 MG/DL — SIGNIFICANT CHANGE UP (ref 8.4–10.5)
CHLORIDE SERPL-SCNC: 104 MMOL/L — SIGNIFICANT CHANGE UP (ref 96–108)
CO2 SERPL-SCNC: 26 MMOL/L — SIGNIFICANT CHANGE UP (ref 22–31)
COLOR SPEC: YELLOW — SIGNIFICANT CHANGE UP
CREAT SERPL-MCNC: 0.71 MG/DL — SIGNIFICANT CHANGE UP (ref 0.5–1.3)
DIFF PNL FLD: NEGATIVE — SIGNIFICANT CHANGE UP
EPI CELLS # UR: SIGNIFICANT CHANGE UP
GLUCOSE SERPL-MCNC: 109 MG/DL — HIGH (ref 70–99)
GLUCOSE UR QL: NEGATIVE — SIGNIFICANT CHANGE UP
HCT VFR BLD CALC: 36.5 % — SIGNIFICANT CHANGE UP (ref 34.5–45)
HGB BLD-MCNC: 12.1 G/DL — SIGNIFICANT CHANGE UP (ref 11.5–15.5)
KETONES UR-MCNC: NEGATIVE — SIGNIFICANT CHANGE UP
LEUKOCYTE ESTERASE UR-ACNC: NEGATIVE — SIGNIFICANT CHANGE UP
MCHC RBC-ENTMCNC: 33 PG — SIGNIFICANT CHANGE UP (ref 27–34)
MCHC RBC-ENTMCNC: 33.3 GM/DL — SIGNIFICANT CHANGE UP (ref 32–36)
MCV RBC AUTO: 99.3 FL — SIGNIFICANT CHANGE UP (ref 80–100)
NITRITE UR-MCNC: NEGATIVE — SIGNIFICANT CHANGE UP
PH UR: 6.5 — SIGNIFICANT CHANGE UP (ref 5–8)
PLATELET # BLD AUTO: 243 K/UL — SIGNIFICANT CHANGE UP (ref 150–400)
POTASSIUM SERPL-MCNC: 3.8 MMOL/L — SIGNIFICANT CHANGE UP (ref 3.5–5.3)
POTASSIUM SERPL-SCNC: 3.8 MMOL/L — SIGNIFICANT CHANGE UP (ref 3.5–5.3)
PROT UR-MCNC: NEGATIVE — SIGNIFICANT CHANGE UP
RBC # BLD: 3.67 M/UL — LOW (ref 3.8–5.2)
RBC # FLD: 12 % — SIGNIFICANT CHANGE UP (ref 10.3–14.5)
RBC CASTS # UR COMP ASSIST: NEGATIVE /HPF — SIGNIFICANT CHANGE UP (ref 0–4)
SODIUM SERPL-SCNC: 138 MMOL/L — SIGNIFICANT CHANGE UP (ref 135–145)
SP GR SPEC: 1.02 — SIGNIFICANT CHANGE UP (ref 1.01–1.02)
UROBILINOGEN FLD QL: 1
WBC # BLD: 5.3 K/UL — SIGNIFICANT CHANGE UP (ref 3.8–10.5)
WBC # FLD AUTO: 5.3 K/UL — SIGNIFICANT CHANGE UP (ref 3.8–10.5)
WBC UR QL: NEGATIVE /HPF — SIGNIFICANT CHANGE UP (ref 0–5)

## 2018-11-29 PROCEDURE — 99232 SBSQ HOSP IP/OBS MODERATE 35: CPT | Mod: GC

## 2018-11-29 PROCEDURE — 99232 SBSQ HOSP IP/OBS MODERATE 35: CPT

## 2018-11-29 RX ADMIN — Medication 81 MILLIGRAM(S): at 15:44

## 2018-11-29 RX ADMIN — LOSARTAN POTASSIUM 100 MILLIGRAM(S): 100 TABLET, FILM COATED ORAL at 06:24

## 2018-11-29 RX ADMIN — ATORVASTATIN CALCIUM 40 MILLIGRAM(S): 80 TABLET, FILM COATED ORAL at 22:13

## 2018-11-29 RX ADMIN — Medication 250 MILLIGRAM(S): at 06:24

## 2018-11-29 RX ADMIN — Medication 100 MILLIGRAM(S): at 22:12

## 2018-11-29 RX ADMIN — Medication 250 MILLIGRAM(S): at 17:52

## 2018-11-29 RX ADMIN — Medication 500000 UNIT(S): at 22:12

## 2018-11-29 RX ADMIN — ENOXAPARIN SODIUM 40 MILLIGRAM(S): 100 INJECTION SUBCUTANEOUS at 15:45

## 2018-11-29 RX ADMIN — Medication 100 MILLIGRAM(S): at 15:45

## 2018-11-29 RX ADMIN — Medication 650 MILLIGRAM(S): at 23:01

## 2018-11-29 RX ADMIN — Medication 500000 UNIT(S): at 06:24

## 2018-11-29 RX ADMIN — SENNA PLUS 2 TABLET(S): 8.6 TABLET ORAL at 22:12

## 2018-11-29 RX ADMIN — Medication 100 MILLIGRAM(S): at 06:24

## 2018-11-29 RX ADMIN — Medication 500000 UNIT(S): at 12:42

## 2018-11-29 RX ADMIN — POLYETHYLENE GLYCOL 3350 17 GRAM(S): 17 POWDER, FOR SOLUTION ORAL at 12:41

## 2018-11-29 RX ADMIN — Medication 20 MILLIGRAM(S): at 15:45

## 2018-11-29 NOTE — PROGRESS NOTE ADULT - ASSESSMENT
Pt is a 87yo F admitted to acute rehab for left ICA CVA    *Left ICA CVA   Admit to acute rehab  Cont PT/OT/SLP   Cont asa  Fluoxetine for motor improvement     *Dysphagia  Cont SLP  Mechanical soft w honey liquid     *HTN  Controlled  One isolated episode of HTN  Cont Losartan     *UTI  +Enterobacter on CX  Cont Cipro for total 7 days  If symptom persist, repeat UA via cath   ok to DC bladder scan     *dysphagia with aphasia  Pureed w honey thickened liquid  SLP    *Constipation   Cont Bowel regimen standing dose     *DVT ppx   Lovenox

## 2018-11-29 NOTE — PROGRESS NOTE ADULT - ASSESSMENT
ASSESSMENT/PLAN  86 year-old woman with a PMH of HTN found to have left ICA (MCA + CYNDEE) stroke s/p tPA  with dense right HP, global aphasia, dysphagia, hemisensory deficits    # Left ICA stroke unknown etiology- with global aphasia and functional quadriplegia  -Aspirin, Statin discontinued as stroke not thought to be of atherosclerotic origin  -continue Fluoxetine  -Continue OT, PT, SLP services  -add recreational therapies  -resting hand splint ordered for tone    #Cardiovascular  - no PFO on ECHO, EF 74%, HTN  -Losartan.    #r/o UTI, +Enterobacter on Cx  no leukocytosis or fever, BUn/Cr stable  -discussed with hospitalist. given persistent symptoms, recommend continuing for SEVEN day course Abx    #GI-  -Decrease Tylenol dose to 325mg prn. Trend LFTs-stable   -Colace and Senna changed from PRN to standing orders    -# DIET  upgarded to mechanical soft and honey thickened liquids    #Code Status: As per meeting with Palliative Team at [facility]  pt is DNR/DNI,  TRIAL OF NG TUBE IF BECOMES DYSPHASIC, IV FLUIDS AS NEEDED,  ANTIBIOTICS FOR INFECTION, RETURN TO HOSPITAL, FULL MEDICAL MANAGEMENT SHORT OF INTUBATION AND RESUSCITATION  -f/u MOLST form      # Case discussed in IDT rounds   Patient has some minimal gains in writing in SLP, continues to require mod assist for transfers, and has developed some icnreased tonicity right side. Due to h/o elevated LFTs and cost of medications on dc, work aggressively on positioning, stretch, modalities, family education. If spasticity worsens, will consider medication after discussion with family  -target dc home 12/7/18 with caregiver assistance min-mod level on dc  -evaluation for home modification as patient will be wheelchair level  -caregiver training      LABS:    CBC BMP 11/29 ASSESSMENT/PLAN  86 year-old woman with a PMH of HTN found to have left ICA (MCA + CYNDEE) stroke s/p tPA  with dense right HP, global aphasia, dysphagia, hemisensory deficits    # Left ICA stroke unknown etiology- with global aphasia and functional quadriplegia  -Aspirin, Statin discontinued as stroke not thought to be of atherosclerotic origin  -continue Fluoxetine  -Continue OT, PT, SLP services  -add recreational therapies  -resting hand splint ordered for tone  -consider baclofen 5 bid-tid. Discussed with pharmacy as patient is elan case, cost monthly medication as a low ~$50/month. will discuss with family if willing to pay. continue aggressive stretch and positioning    #Cardiovascular  - no PFO on ECHO, EF 74%, HTN  -Losartan.    #r/o UTI, +Enterobacter on Cx  no leukocytosis or fever, BUn/Cr stable  -discussed with hospitalist. given persistent symptoms, recommend continuing for SEVEN day course Abx. Day 7    #GI-  -Decrease Tylenol dose to 325mg prn. Trend LFTs-stable   -Colace and Senna changed from PRN to standing orders    -# DIET  upgarded to mechanical soft and honey thickened liquids    #Labs stable 11/29    #Code Status: As per meeting with Palliative Team at [facility]  pt is DNR/DNI,  TRIAL OF NG TUBE IF BECOMES DYSPHASIC, IV FLUIDS AS NEEDED,  ANTIBIOTICS FOR INFECTION, RETURN TO HOSPITAL, FULL MEDICAL MANAGEMENT SHORT OF INTUBATION AND RESUSCITATION  -f/u MOLST form      # Case discussed in IDT rounds   Patient has some minimal gains in writing in SLP, continues to require mod assist for transfers, and has developed some icnreased tonicity right side. Due to h/o elevated LFTs and cost of medications on dc, work aggressively on positioning, stretch, modalities, family education. If spasticity worsens, will consider medication after discussion with family  -target dc home 12/7/18 with caregiver assistance min-mod level on dc  -evaluation for home modification as patient will be wheelchair level  -caregiver training      LABS:    CBC BMP 12/3 ASSESSMENT/PLAN  86 year-old woman with a PMH of HTN found to have left ICA (MCA + CYNDEE) stroke s/p tPA  with dense right HP, global aphasia, dysphagia, hemisensory deficits    # Left ICA stroke unknown etiology- with global aphasia and functional quadriplegia  -Aspirin, Statin discontinued as stroke not thought to be of atherosclerotic origin  -continue Fluoxetine  -Continue OT, PT, SLP services  -add recreational therapies  -resting hand splint ordered for tone  -consider baclofen 5 bid-tid. Discussed with pharmacy as patient is elan case, cost monthly medication as a low ~$50/month. will discuss with family if willing to pay. continue aggressive stretch and positioning    #Cardiovascular  - no PFO on ECHO, EF 74%, HTN  -Losartan.    #r/o UTI, +Enterobacter on Cx  no leukocytosis or fever, BUn/Cr stable  -discussed with hospitalist. given persistent symptoms, recommend continuing for SEVEN day course Abx. Day 7  voiding well, dc bladder scan  -repeat UA (cath)    #GI-  -Decrease Tylenol dose to 325mg prn. Trend LFTs-stable   -Colace and Senna changed from PRN to standing orders    -# DIET  upgarded to mechanical soft and honey thickened liquids    #Labs stable 11/29    #Code Status: As per meeting with Palliative Team at [facility]  pt is DNR/DNI,  TRIAL OF NG TUBE IF BECOMES DYSPHASIC, IV FLUIDS AS NEEDED,  ANTIBIOTICS FOR INFECTION, RETURN TO HOSPITAL, FULL MEDICAL MANAGEMENT SHORT OF INTUBATION AND RESUSCITATION  -f/u MOLST form      # Case discussed in IDT rounds   Patient has some minimal gains in writing in SLP, continues to require mod assist for transfers, and has developed some icnreased tonicity right side. Due to h/o elevated LFTs and cost of medications on dc, work aggressively on positioning, stretch, modalities, family education. If spasticity worsens, will consider medication after discussion with family  -target dc home 12/7/18 with caregiver assistance min-mod level on dc  -evaluation for home modification as patient will be wheelchair level  -caregiver training      LABS:    CBC BMP 12/3  UA

## 2018-11-29 NOTE — PROGRESS NOTE ADULT - SUBJECTIVE AND OBJECTIVE BOX
HPI  Pt is a 85yo F admitted to acute rehab for left ICA CVA  Pt was seen and examined at bedside. Pt is nonverbal, able to make gesture for yes and no. No overnight event. Hemodynamically stable     Vital Signs Last 24 Hrs  T(C): 37 (29 Nov 2018 08:43), Max: 37 (29 Nov 2018 08:43)  T(F): 98.6 (29 Nov 2018 08:43), Max: 98.6 (29 Nov 2018 08:43)  HR: 76 (29 Nov 2018 08:43) (72 - 77)  BP: 138/74 (29 Nov 2018 08:43) (109/65 - 139/72)  BP(mean): --  RR: 14 (29 Nov 2018 08:43) (14 - 14)  SpO2: 98% (29 Nov 2018 08:43) (97% - 98%)    I&O's Summary      CAPILLARY BLOOD GLUCOSE    PHYSICAL EXAM:    Constitutional: NAD, awake and alert,  Neck: Soft   Respiratory: Breath sounds are clear bilaterally,  Cardiovascular: S1 and S2,   Gastrointestinal: Bowel Sounds present, soft,   Extremities: No peripheral edema  Neurological: aphasic, non verbal  Musculoskeletal: RUE flasid. RLE move minimal per son   Skin: No rashes    MEDICATIONS:  MEDICATIONS  (STANDING):  aspirin  chewable 81 milliGRAM(s) Oral daily  atorvastatin 40 milliGRAM(s) Oral at bedtime  bisacodyl 5 milliGRAM(s) Oral at bedtime  ciprofloxacin     Tablet 250 milliGRAM(s) Oral every 12 hours  docusate sodium 100 milliGRAM(s) Oral three times a day  enoxaparin Injectable 40 milliGRAM(s) SubCutaneous daily  FLUoxetine 20 milliGRAM(s) Oral daily  losartan 100 milliGRAM(s) Oral daily  nystatin    Suspension 672891 Unit(s) Oral three times a day  polyethylene glycol 3350 17 Gram(s) Oral daily  saccharomyces boulardii 250 milliGRAM(s) Oral two times a day  senna 2 Tablet(s) Oral at bedtime      LABS: All Labs Reviewed:                        12.1   5.3   )-----------( 243      ( 29 Nov 2018 06:45 )             36.5     11-29    138  |  104  |  12  ----------------------------<  109<H>  3.8   |  26  |  0.71    Ca    9.7      29 Nov 2018 06:45            Blood Culture:     RADIOLOGY/EKG:    DVT PPX:    ADVANCED DIRECTIVE:    DISPOSITION:

## 2018-11-29 NOTE — PROGRESS NOTE ADULT - SUBJECTIVE AND OBJECTIVE BOX
Patient is a 86y old  Female who presents with a chief complaint of CVA right HP , aphasia (29 Nov 2018 11:59)      HPI:  86 year old woman RH dominant with multiple vascular risk factors, including age and HTN was transferred from [facility] to [facility] for acute onset of right-sided weakness. She was noted to have developed acute onset of right-sided weakness and language disturbance. She was initially evaluated at [facility] through tele-stroke and was treated with IV tPA (Admission NIHSS 22). MRI brain showed left CYNDEE and MCA distribution infarct with mild hemorrhagic transformation (HI 1). 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    During hospital stay was neurologically without acute change: remains with global aphasia, right hemiplegia . Permissive hypertension was slowly titrated to normotension. Course was complicated by urinary retention which was managed with PRN straight catheterization. MBS was performed and patient was placed on a Dysphagia 1 honey diet which has been well-tolerated. Lipitor discontinued due to admission LDL of 104. Family meeting to discuss goals of care done on 11/8 2pm, family decided code status DNR/DNI. Discussed and completed MOLST form. Briefly,  pt is DNR/DNI,  TRIAL OF NG TUBE IF BECOMES DYSPHASIC, IV FLUIDS AS NEEDED,  ANTIBIOTICS FOR INFECTION, RETURN TO HOSPITAL, FULL MEDICAL MANAGEMENT SHORT OF INTUBATION AND RESUSCITATION. Cardiac monitoring revealed no events. TTE shows EF 74% no evidence of thrombus or PFO.  Deemed medically stable for acute rehab. (15 Nov 2018 16:23)      PAST MEDICAL & SURGICAL HISTORY:  HTN (hypertension)  Hypertension  S/P appendectomy      MEDICATIONS  (STANDING):  aspirin  chewable 81 milliGRAM(s) Oral daily  atorvastatin 40 milliGRAM(s) Oral at bedtime  bisacodyl 5 milliGRAM(s) Oral at bedtime  ciprofloxacin     Tablet 250 milliGRAM(s) Oral every 12 hours  docusate sodium 100 milliGRAM(s) Oral three times a day  enoxaparin Injectable 40 milliGRAM(s) SubCutaneous daily  FLUoxetine 20 milliGRAM(s) Oral daily  losartan 100 milliGRAM(s) Oral daily  nystatin    Suspension 221082 Unit(s) Oral three times a day  polyethylene glycol 3350 17 Gram(s) Oral daily  saccharomyces boulardii 250 milliGRAM(s) Oral two times a day  senna 2 Tablet(s) Oral at bedtime    MEDICATIONS  (PRN):  acetaminophen   Tablet .. 650 milliGRAM(s) Oral every 6 hours PRN Temp greater or equal to 38C (100.4F), Mild Pain (1 - 3)  bisacodyl Suppository 10 milliGRAM(s) Rectal daily PRN Constipation  phenol 1.4% (CHLORASEPTIC) Oral Spray 1 Spray(s) Topical every 4 hours PRN sore throat pain      Allergies    No Known Allergies    Intolerances        TODAY'S SUBJECTIVE & REVIEW OF SYMPTOMS:    Patient seen with assistance language line  in [place] #[id number]. Pain seems to have improved in abdomen. Noted to have some discomfort in LUE and LE, increased spasticity which is also affecting transfers and standing tolerance. mood stable, fair. Still severely aphasic marlena expressively    PHYSICAL EXAM  86y  Vital Signs Last 24 Hrs  T(C): 37 (29 Nov 2018 08:43), Max: 37 (29 Nov 2018 08:43)  T(F): 98.6 (29 Nov 2018 08:43), Max: 98.6 (29 Nov 2018 08:43)  HR: 76 (29 Nov 2018 08:43) (72 - 77)  BP: 138/74 (29 Nov 2018 08:43) (109/65 - 139/72)  BP(mean): --  RR: 14 (29 Nov 2018 08:43) (14 - 14)  SpO2: 98% (29 Nov 2018 08:43) (97% - 98%)  Daily     Daily     General: alert,     Resp:   Cardio:  Abdomen:   Extremities:  Neuro:  Skin:      RECENT LABS:                          12.1   5.3   )-----------( 243      ( 29 Nov 2018 06:45 )             36.5     11-29    138  |  104  |  12  ----------------------------<  109<H>  3.8   |  26  |  0.71    Ca    9.7      29 Nov 2018 06:45                CAPILLARY BLOOD GLUCOSE        IMPRESSION AND PLAN: Patient is a 86y old  Female who presents with a chief complaint of CVA right HP , aphasia (29 Nov 2018 11:59)      HPI:  86 year old woman RH dominant with multiple vascular risk factors, including age and HTN was transferred from [facility] to [facility] for acute onset of right-sided weakness. She was noted to have developed acute onset of right-sided weakness and language disturbance. She was initially evaluated at [facility] through tele-stroke and was treated with IV tPA (Admission NIHSS 22). MRI brain showed left CYNDEE and MCA distribution infarct with mild hemorrhagic transformation (HI 1). 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    During hospital stay was neurologically without acute change: remains with global aphasia, right hemiplegia . Permissive hypertension was slowly titrated to normotension. Course was complicated by urinary retention which was managed with PRN straight catheterization. MBS was performed and patient was placed on a Dysphagia 1 honey diet which has been well-tolerated. Lipitor discontinued due to admission LDL of 104. Family meeting to discuss goals of care done on 11/8 2pm, family decided code status DNR/DNI. Discussed and completed MOLST form. Briefly,  pt is DNR/DNI,  TRIAL OF NG TUBE IF BECOMES DYSPHASIC, IV FLUIDS AS NEEDED,  ANTIBIOTICS FOR INFECTION, RETURN TO HOSPITAL, FULL MEDICAL MANAGEMENT SHORT OF INTUBATION AND RESUSCITATION. Cardiac monitoring revealed no events. TTE shows EF 74% no evidence of thrombus or PFO.  Deemed medically stable for acute rehab. (15 Nov 2018 16:23)      PAST MEDICAL & SURGICAL HISTORY:  HTN (hypertension)  Hypertension  S/P appendectomy      MEDICATIONS  (STANDING):  aspirin  chewable 81 milliGRAM(s) Oral daily  atorvastatin 40 milliGRAM(s) Oral at bedtime  bisacodyl 5 milliGRAM(s) Oral at bedtime  ciprofloxacin     Tablet 250 milliGRAM(s) Oral every 12 hours  docusate sodium 100 milliGRAM(s) Oral three times a day  enoxaparin Injectable 40 milliGRAM(s) SubCutaneous daily  FLUoxetine 20 milliGRAM(s) Oral daily  losartan 100 milliGRAM(s) Oral daily  nystatin    Suspension 598220 Unit(s) Oral three times a day  polyethylene glycol 3350 17 Gram(s) Oral daily  saccharomyces boulardii 250 milliGRAM(s) Oral two times a day  senna 2 Tablet(s) Oral at bedtime    MEDICATIONS  (PRN):  acetaminophen   Tablet .. 650 milliGRAM(s) Oral every 6 hours PRN Temp greater or equal to 38C (100.4F), Mild Pain (1 - 3)  bisacodyl Suppository 10 milliGRAM(s) Rectal daily PRN Constipation  phenol 1.4% (CHLORASEPTIC) Oral Spray 1 Spray(s) Topical every 4 hours PRN sore throat pain      Allergies    No Known Allergies    Intolerances        TODAY'S SUBJECTIVE & REVIEW OF SYMPTOMS:    Patient seen with assistance language line  in [place] #[id number]. Pain seems to have improved in abdomen. Noted to have some discomfort in LUE and LE, increased spasticity which is also affecting transfers and standing tolerance. mood stable, fair. Still severely aphasic marlena expressively    PHYSICAL EXAM  86y  Vital Signs Last 24 Hrs  T(C): 37 (29 Nov 2018 08:43), Max: 37 (29 Nov 2018 08:43)  T(F): 98.6 (29 Nov 2018 08:43), Max: 98.6 (29 Nov 2018 08:43)  HR: 76 (29 Nov 2018 08:43) (72 - 77)  BP: 138/74 (29 Nov 2018 08:43) (109/65 - 139/72)  BP(mean): --  RR: 14 (29 Nov 2018 08:43) (14 - 14)  SpO2: 98% (29 Nov 2018 08:43) (97% - 98%)  Daily     Daily     General: alert, follows simple commands, yes/no even with gestures inconsistent. Does not look distressed unless PROM right extremities    Resp: clear bilaterally no R?r/W  Cardio: Nl S1 and S2 regular  Abdomen:  +BS soft, obese, very mild grimacing with deep palpation abdomen  Extremities:right UE and LE flexor spasticity  right knee PROM -10 degrees full extension, hamstring tightness, discomfort end range motion  no clonus  no calf sweling or redness    Skin: intact      RECENT LABS:                          12.1   5.3   )-----------( 243      ( 29 Nov 2018 06:45 )             36.5     11-29    138  |  104  |  12  ----------------------------<  109<H>  3.8   |  26  |  0.71    Ca    9.7      29 Nov 2018 06:45                CAPILLARY BLOOD GLUCOSE        IMPRESSION AND PLAN:

## 2018-11-30 PROCEDURE — 99232 SBSQ HOSP IP/OBS MODERATE 35: CPT

## 2018-11-30 RX ORDER — BACLOFEN 100 %
5 POWDER (GRAM) MISCELLANEOUS
Qty: 0 | Refills: 0 | Status: DISCONTINUED | OUTPATIENT
Start: 2018-11-30 | End: 2018-12-01

## 2018-11-30 RX ADMIN — Medication 250 MILLIGRAM(S): at 05:47

## 2018-11-30 RX ADMIN — Medication 500000 UNIT(S): at 21:44

## 2018-11-30 RX ADMIN — Medication 500000 UNIT(S): at 05:55

## 2018-11-30 RX ADMIN — Medication 20 MILLIGRAM(S): at 13:29

## 2018-11-30 RX ADMIN — Medication 650 MILLIGRAM(S): at 09:17

## 2018-11-30 RX ADMIN — Medication 500000 UNIT(S): at 13:29

## 2018-11-30 RX ADMIN — LOSARTAN POTASSIUM 100 MILLIGRAM(S): 100 TABLET, FILM COATED ORAL at 05:47

## 2018-11-30 RX ADMIN — Medication 100 MILLIGRAM(S): at 05:47

## 2018-11-30 RX ADMIN — SENNA PLUS 2 TABLET(S): 8.6 TABLET ORAL at 21:45

## 2018-11-30 RX ADMIN — Medication 650 MILLIGRAM(S): at 10:00

## 2018-11-30 RX ADMIN — Medication 5 MILLIGRAM(S): at 21:44

## 2018-11-30 RX ADMIN — Medication 650 MILLIGRAM(S): at 00:10

## 2018-11-30 RX ADMIN — ENOXAPARIN SODIUM 40 MILLIGRAM(S): 100 INJECTION SUBCUTANEOUS at 12:38

## 2018-11-30 RX ADMIN — Medication 100 MILLIGRAM(S): at 21:44

## 2018-11-30 RX ADMIN — Medication 81 MILLIGRAM(S): at 12:38

## 2018-11-30 RX ADMIN — Medication 5 MILLIGRAM(S): at 17:52

## 2018-11-30 RX ADMIN — ATORVASTATIN CALCIUM 40 MILLIGRAM(S): 80 TABLET, FILM COATED ORAL at 21:44

## 2018-11-30 RX ADMIN — Medication 250 MILLIGRAM(S): at 17:52

## 2018-11-30 NOTE — PROGRESS NOTE ADULT - ASSESSMENT
ASSESSMENT/PLAN  86 year-old woman with a PMH of HTN found to have left ICA (MCA + CYNDEE) stroke s/p tPA  with dense right HP, global aphasia, dysphagia, hemisensory deficits    # Left ICA stroke unknown etiology- with global aphasia and functional quadriplegia  -Aspirin, Statin discontinued as stroke not thought to be of atherosclerotic origin  -continue Fluoxetine  -Continue OT, PT, SLP services  -add recreational therapies  -resting hand splint ordered for tone  -start baclofen 5 bid. Discussed with pharmacy as patient is elan case, cost monthly medication as a low ~$50/month. will discuss with family if willing to pay. continue aggressive stretch and positioning  -add warm compress for bilateral hamstrings    #Cardiovascular  - no PFO on ECHO, EF 74%, HTN  -Losartan.    #r/o UTI, +Enterobacter on Cx  no leukocytosis or fever, BUn/Cr stable  -discussed with hospitalist. given persistent symptoms, recommend continuing for SEVEN day course Abx. Day 7- completed 11/29  voiding well, dc bladder scan  -repeat UA (cath) -negative 11/29    #GI-  -Decrease Tylenol dose to 325mg prn. Trend LFTs-stable   -Colace and Senna changed from PRN to standing orders    -# DIET  upgarded to mechanical soft and honey thickened liquids    #Labs stable 11/29    #Code Status: As per meeting with Palliative Team at [facility]  pt is DNR/DNI,  TRIAL OF NG TUBE IF BECOMES DYSPHASIC, IV FLUIDS AS NEEDED,  ANTIBIOTICS FOR INFECTION, RETURN TO HOSPITAL, FULL MEDICAL MANAGEMENT SHORT OF INTUBATION AND RESUSCITATION  -f/u MOLST form      # Case discussed in IDT rounds   Patient has some minimal gains in writing in SLP, continues to require mod assist for transfers, and has developed some icnreased tonicity right side. Due to h/o elevated LFTs and cost of medications on dc, work aggressively on positioning, stretch, modalities, family education. If spasticity worsens, will consider medication after discussion with family  -target dc home 12/7/18 with caregiver assistance min-mod level on dc  -evaluation for home modification as patient will be wheelchair level  -caregiver training      LABS:    CBC BMP 12/3 ASSESSMENT/PLAN  86 year-old woman with a PMH of HTN found to have left ICA (MCA + CYNDEE) stroke s/p tPA  with dense right HP, global aphasia, dysphagia, hemisensory deficits    # Left ICA stroke unknown etiology- with global aphasia and functional quadriplegia  -Aspirin, Statin discontinued as stroke not thought to be of atherosclerotic origin  -continue Fluoxetine  -Continue OT, PT, SLP services  -add recreational therapies  -resting hand splint ordered for tone  -start baclofen 5 bid 11/30. Discussed with pharmacy as patient is elan case, cost monthly medication as a low ~$50/month. will discuss with family if willing to pay. continue aggressive stretch and positioning. Discussed start with patient who is agreeable as tone is interfering with transfers, function and also pain (now 2 person assist OOB to chair). Mild elevation transaminases 11/23, repeat LFTs MON 12/3  -add warm compress for bilateral hamstrings    #Cardiovascular  - no PFO on ECHO, EF 74%, HTN  -Losartan.    #s/p Rx UTI, +Enterobacter on Cx  no leukocytosis or fever, BUn/Cr stable  -discussed with hospitalist. given persistent symptoms, recommend continuing for SEVEN day course Abx. Day 7- completed 11/29  voiding well, dc bladder scan  -repeat UA (cath) -negative 11/29    #GI-  -Decrease Tylenol dose to 325mg prn. Trend LFTs-stable   -Colace and Senna changed from PRN to standing orders    -# DIET  upgarded to mechanical soft and honey thickened liquids      #Code Status: As per meeting with Palliative Team at [facility]  pt is DNR/DNI,  TRIAL OF NG TUBE IF BECOMES DYSPHASIC, IV FLUIDS AS NEEDED,  ANTIBIOTICS FOR INFECTION, RETURN TO HOSPITAL, FULL MEDICAL MANAGEMENT SHORT OF INTUBATION AND RESUSCITATION    # Case discussed in IDT rounds   Patient has some minimal gains in writing in SLP, continues to require mod assist for transfers, and has developed some icnreased tonicity right side. Due to h/o elevated LFTs and cost of medications on dc, work aggressively on positioning, stretch, modalities, family education. If spasticity worsens, will consider medication after discussion with family  -target dc home 12/7/18 with caregiver assistance min-mod level on dc  -evaluation for home modification as patient will be wheelchair level  -caregiver training      LABS:    CBC BMP 12/3  LFTS 12/3

## 2018-11-30 NOTE — PROGRESS NOTE ADULT - SUBJECTIVE AND OBJECTIVE BOX
86y old  Female who presents with a chief complaint of CVA, seen at the bedside, no new complaints per staff no n/v, no sob    Vital Signs Last 24 Hrs  T(C): 36.8 (2018 08:03), Max: 36.8 (2018 21:51)  T(F): 98.2 (2018 08:03), Max: 98.3 (2018 21:51)  HR: 97 (2018 08:03) (73 - 97)  BP: 155/74 (2018 08:03) (130/65 - 164/70)  BP(mean): --  RR: 14 (2018 08:03) (14 - 16)  SpO2: 95% (2018 08:03) (95% - 99%)    NAD, RUSS, MMM, NCAT  SUPPLE  CLEAR  S1S2  SOFT NT BS PRESENT, eg present  NO PEDAL EDEMA  dysphasia  right hemiparesis    labs reviewed              12.4                 138  | 26   | 14           4.8   >-----------< 274     ------------------------< 110                   37.3                 3.8  | 104  | 0.89                                         Ca 9.9   Mg x     Ph x                        12.1                 138  | 26   | 12           5.3   >-----------< 243     ------------------------< 109                   36.5                 3.8  | 104  | 0.71                                         Ca 9.7   Mg x     Ph x      Urinalysis Basic - ( 2018 18:21 )    Color: Yellow / Appearance: Clear / S.020 / pH: x  Gluc: x / Ketone: Negative  / Bili: Small / Urobili: 1   Blood: x / Protein: Negative / Nitrite: Negative   Leuk Esterase: Negative / RBC: Negative /HPF / WBC Negative /HPF   Sq Epi: x / Non Sq Epi: Neg.-Few / Bacteria: Negative /HPF

## 2018-11-30 NOTE — PROGRESS NOTE ADULT - ASSESSMENT
86 year-old woman with a PMH of HTN found to have left ICA (MCA + CYNDEE) stroke s/p tPA  with dense right HP, global aphasia, dysphagia, hemisensory deficits-OT, PT, SLP    # Left ICA stroke unknown etiology--Aspirin,   -Fluoxetine for motor recovery after stroke     Cardiovascular- no PFO on ECHO, EF 74%,   HTN- Losartan    hld- statin  uti treated with cipro.

## 2018-11-30 NOTE — PROGRESS NOTE ADULT - SUBJECTIVE AND OBJECTIVE BOX
Patient is a 86y old  Female who presents with a chief complaint of CVA right HP , aphasia (2018 10:45)      HPI:  86 year old woman RH dominant with multiple vascular risk factors, including age and HTN was transferred from [facility] to [facility] for acute onset of right-sided weakness. She was noted to have developed acute onset of right-sided weakness and language disturbance. She was initially evaluated at [facility] through tele-stroke and was treated with IV tPA (Admission NIHSS 22). MRI brain showed left CYNDEE and MCA distribution infarct with mild hemorrhagic transformation (HI 1). 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    During hospital stay was neurologically without acute change: remains with global aphasia, right hemiplegia . Permissive hypertension was slowly titrated to normotension. Course was complicated by urinary retention which was managed with PRN straight catheterization. MBS was performed and patient was placed on a Dysphagia 1 honey diet which has been well-tolerated. Lipitor discontinued due to admission LDL of 104. Family meeting to discuss goals of care done on  2pm, family decided code status DNR/DNI. Discussed and completed MOLST form. Briefly,  pt is DNR/DNI,  TRIAL OF NG TUBE IF BECOMES DYSPHASIC, IV FLUIDS AS NEEDED,  ANTIBIOTICS FOR INFECTION, RETURN TO HOSPITAL, FULL MEDICAL MANAGEMENT SHORT OF INTUBATION AND RESUSCITATION. Cardiac monitoring revealed no events. TTE shows EF 74% no evidence of thrombus or PFO.  Deemed medically stable for acute rehab. (15 Nov 2018 16:23)      PAST MEDICAL & SURGICAL HISTORY:  HTN (hypertension)  Hypertension  S/P appendectomy      MEDICATIONS  (STANDING):  aspirin  chewable 81 milliGRAM(s) Oral daily  atorvastatin 40 milliGRAM(s) Oral at bedtime  baclofen 5 milliGRAM(s) Oral two times a day  bisacodyl 5 milliGRAM(s) Oral at bedtime  docusate sodium 100 milliGRAM(s) Oral three times a day  enoxaparin Injectable 40 milliGRAM(s) SubCutaneous daily  FLUoxetine 20 milliGRAM(s) Oral daily  losartan 100 milliGRAM(s) Oral daily  nystatin    Suspension 069187 Unit(s) Oral three times a day  polyethylene glycol 3350 17 Gram(s) Oral daily  saccharomyces boulardii 250 milliGRAM(s) Oral two times a day  senna 2 Tablet(s) Oral at bedtime    MEDICATIONS  (PRN):  acetaminophen   Tablet .. 650 milliGRAM(s) Oral every 6 hours PRN Temp greater or equal to 38C (100.4F), Mild Pain (1 - 3)  bisacodyl Suppository 10 milliGRAM(s) Rectal daily PRN Constipation  phenol 1.4% (CHLORASEPTIC) Oral Spray 1 Spray(s) Topical every 4 hours PRN sore throat pain      Allergies    No Known Allergies    Intolerances        TODAY'S SUBJECTIVE & REVIEW OF SYMPTOMS:  Patient was seen and examined today. There were no acute events overnight. Patient with moderate-severe spasticity Right lower extremity- difficult to transfer and stand for therapy, will start on low dose of baclofen.  Patient does not have any further abdominal pain, UA was negative. No headache, patient looks alert and in good spirits. PCA at bedside for Setswana translation    PHYSICAL EXAM  86y  Vital Signs Last 24 Hrs  T(C): 36.8 (2018 08:03), Max: 36.8 (2018 21:51)  T(F): 98.2 (2018 08:03), Max: 98.3 (2018 21:51)  HR: 97 (2018 08:03) (73 - 97)  BP: 155/74 (2018 08:03) (130/65 - 164/70)  BP(mean): --  RR: 14 (2018 08:03) (14 - 16)  SpO2: 95% (2018 08:03) (95% - 99%)  Daily     Daily     General: alert, follows simple commands, yes/no even with gestures inconsistent. Does not look distressed unless PROM right extremities    Resp: clear bilaterally no R/r/W  Cardio: Nl S1 and S2 regular  Abdomen:  +BS soft, obese, very mild grimacing with deep palpation abdomen  Extremities: right UE and LE flexor spasticity  right knee PROM -10 degrees full extension, hamstring tightness, discomfort end range motion  no clonus  no calf sweling or redness    Skin: intact      RECENT LABS:                          12.1   5.3   )-----------( 243      ( 2018 06:45 )             36.5         138  |  104  |  12  ----------------------------<  109<H>  3.8   |  26  |  0.71    Ca    9.7      2018 06:45          Urinalysis Basic - ( 2018 18:21 )    Color: Yellow / Appearance: Clear / S.020 / pH: x  Gluc: x / Ketone: Negative  / Bili: Small / Urobili: 1   Blood: x / Protein: Negative / Nitrite: Negative   Leuk Esterase: Negative / RBC: Negative /HPF / WBC Negative /HPF   Sq Epi: x / Non Sq Epi: Neg.-Few / Bacteria: Negative /HPF          CAPILLARY BLOOD GLUCOSE        IMPRESSION AND PLAN: Patient is a 86y old  Female who presents with a chief complaint of CVA right HP , aphasia (2018 10:45)      HPI:  86 year old woman RH dominant with multiple vascular risk factors, including age and HTN was transferred from [facility] to [facility] for acute onset of right-sided weakness. She was noted to have developed acute onset of right-sided weakness and language disturbance. She was initially evaluated at [facility] through tele-stroke and was treated with IV tPA (Admission NIHSS 22). MRI brain showed left CYNDEE and MCA distribution infarct with mild hemorrhagic transformation (HI 1). 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    During hospital stay was neurologically without acute change: remains with global aphasia, right hemiplegia . Permissive hypertension was slowly titrated to normotension. Course was complicated by urinary retention which was managed with PRN straight catheterization. MBS was performed and patient was placed on a Dysphagia 1 honey diet which has been well-tolerated. Lipitor discontinued due to admission LDL of 104. Family meeting to discuss goals of care done on  2pm, family decided code status DNR/DNI. Discussed and completed MOLST form. Briefly,  pt is DNR/DNI,  TRIAL OF NG TUBE IF BECOMES DYSPHASIC, IV FLUIDS AS NEEDED,  ANTIBIOTICS FOR INFECTION, RETURN TO HOSPITAL, FULL MEDICAL MANAGEMENT SHORT OF INTUBATION AND RESUSCITATION. Cardiac monitoring revealed no events. TTE shows EF 74% no evidence of thrombus or PFO.  Deemed medically stable for acute rehab. (15 Nov 2018 16:23)      PAST MEDICAL & SURGICAL HISTORY:  HTN (hypertension)  Hypertension  S/P appendectomy      MEDICATIONS  (STANDING):  aspirin  chewable 81 milliGRAM(s) Oral daily  atorvastatin 40 milliGRAM(s) Oral at bedtime  baclofen 5 milliGRAM(s) Oral two times a day  bisacodyl 5 milliGRAM(s) Oral at bedtime  docusate sodium 100 milliGRAM(s) Oral three times a day  enoxaparin Injectable 40 milliGRAM(s) SubCutaneous daily  FLUoxetine 20 milliGRAM(s) Oral daily  losartan 100 milliGRAM(s) Oral daily  nystatin    Suspension 277880 Unit(s) Oral three times a day  polyethylene glycol 3350 17 Gram(s) Oral daily  saccharomyces boulardii 250 milliGRAM(s) Oral two times a day  senna 2 Tablet(s) Oral at bedtime    MEDICATIONS  (PRN):  acetaminophen   Tablet .. 650 milliGRAM(s) Oral every 6 hours PRN Temp greater or equal to 38C (100.4F), Mild Pain (1 - 3)  bisacodyl Suppository 10 milliGRAM(s) Rectal daily PRN Constipation  phenol 1.4% (CHLORASEPTIC) Oral Spray 1 Spray(s) Topical every 4 hours PRN sore throat pain      Allergies    No Known Allergies    Intolerances        TODAY'S SUBJECTIVE & REVIEW OF SYMPTOMS:  Patient was seen and examined today. There were no acute events overnight. Patient with moderate-severe spasticity Right lower extremity- difficult to transfer and stand for therapy, will start on low dose of baclofen.  Patient does not have any further abdominal pain, UA was negative. No headache, patient looks alert and in good spirits. PCA at bedside for Kinyarwanda translation. Affect appears more brighter, responses quicker and more consistent    PHYSICAL EXAM  86y  Vital Signs Last 24 Hrs  T(C): 36.8 (2018 08:03), Max: 36.8 (2018 21:51)  T(F): 98.2 (2018 08:03), Max: 98.3 (2018 21:51)  HR: 97 (2018 08:03) (73 - 97)  BP: 155/74 (2018 08:03) (130/65 - 164/70)  BP(mean): --  RR: 14 (2018 08:03) (14 - 16)  SpO2: 95% (2018 08:03) (95% - 99%)  Daily     Daily     General: alert, follows simple commands, yes/no even with gestures inconsistent. Does not look distressed unless PROM right extremities both Ue and LE    Resp: clear bilaterally no R/r/W  Cardio: Nl S1 and S2 regular  Abdomen:  +BS soft, obese, very mild grimacing with deep palpation abdomen  Extremities: right UE and LE flexor spasticity  right knee PROM -20-25 degrees full extension, hamstring tightness, discomfort end range motion  discomfort hamstring palpation  no clonus  no calf sweling or redness    Skin: intact      RECENT LABS:                          12.1   5.3   )-----------( 243      ( 2018 06:45 )             36.5     11-    138  |  104  |  12  ----------------------------<  109<H>  3.8   |  26  |  0.71    Ca    9.7      2018 06:45          Urinalysis Basic - ( 2018 18:21 )    Color: Yellow / Appearance: Clear / S.020 / pH: x  Gluc: x / Ketone: Negative  / Bili: Small / Urobili: 1   Blood: x / Protein: Negative / Nitrite: Negative   Leuk Esterase: Negative / RBC: Negative /HPF / WBC Negative /HPF   Sq Epi: x / Non Sq Epi: Neg.-Few / Bacteria: Negative /HPF          CAPILLARY BLOOD GLUCOSE        IMPRESSION AND PLAN:

## 2018-12-01 PROCEDURE — 99232 SBSQ HOSP IP/OBS MODERATE 35: CPT

## 2018-12-01 RX ORDER — ONDANSETRON 8 MG/1
4 TABLET, FILM COATED ORAL ONCE
Qty: 0 | Refills: 0 | Status: COMPLETED | OUTPATIENT
Start: 2018-12-01 | End: 2018-12-01

## 2018-12-01 RX ORDER — BACLOFEN 100 %
5 POWDER (GRAM) MISCELLANEOUS THREE TIMES A DAY
Qty: 0 | Refills: 0 | Status: DISCONTINUED | OUTPATIENT
Start: 2018-12-01 | End: 2018-12-03

## 2018-12-01 RX ADMIN — Medication 5 MILLIGRAM(S): at 22:43

## 2018-12-01 RX ADMIN — Medication 20 MILLIGRAM(S): at 12:59

## 2018-12-01 RX ADMIN — Medication 100 MILLIGRAM(S): at 05:28

## 2018-12-01 RX ADMIN — Medication 250 MILLIGRAM(S): at 05:28

## 2018-12-01 RX ADMIN — Medication 650 MILLIGRAM(S): at 17:59

## 2018-12-01 RX ADMIN — Medication 81 MILLIGRAM(S): at 12:59

## 2018-12-01 RX ADMIN — Medication 500000 UNIT(S): at 13:15

## 2018-12-01 RX ADMIN — POLYETHYLENE GLYCOL 3350 17 GRAM(S): 17 POWDER, FOR SOLUTION ORAL at 12:59

## 2018-12-01 RX ADMIN — SENNA PLUS 2 TABLET(S): 8.6 TABLET ORAL at 22:44

## 2018-12-01 RX ADMIN — ENOXAPARIN SODIUM 40 MILLIGRAM(S): 100 INJECTION SUBCUTANEOUS at 12:59

## 2018-12-01 RX ADMIN — Medication 5 MILLIGRAM(S): at 13:15

## 2018-12-01 RX ADMIN — Medication 250 MILLIGRAM(S): at 18:00

## 2018-12-01 RX ADMIN — LOSARTAN POTASSIUM 100 MILLIGRAM(S): 100 TABLET, FILM COATED ORAL at 05:28

## 2018-12-01 RX ADMIN — Medication 5 MILLIGRAM(S): at 05:27

## 2018-12-01 RX ADMIN — Medication 100 MILLIGRAM(S): at 22:43

## 2018-12-01 RX ADMIN — Medication 650 MILLIGRAM(S): at 08:51

## 2018-12-01 RX ADMIN — Medication 500000 UNIT(S): at 22:43

## 2018-12-01 RX ADMIN — ONDANSETRON 4 MILLIGRAM(S): 8 TABLET, FILM COATED ORAL at 12:59

## 2018-12-01 RX ADMIN — ATORVASTATIN CALCIUM 40 MILLIGRAM(S): 80 TABLET, FILM COATED ORAL at 22:43

## 2018-12-01 RX ADMIN — Medication 500000 UNIT(S): at 05:28

## 2018-12-01 RX ADMIN — Medication 100 MILLIGRAM(S): at 13:15

## 2018-12-01 NOTE — PROGRESS NOTE ADULT - ASSESSMENT
ASSESSMENT/PLAN  86 year-old woman with a PMH of HTN found to have left ICA (MCA + CYNDEE) stroke s/p tPA  with dense right HP, global aphasia, dysphagia, hemisensory deficits    # Left ICA stroke unknown etiology- with global aphasia and functional quadriplegia  -Aspirin, Statin discontinued as stroke not thought to be of atherosclerotic origin  -continue Fluoxetine  -Continue OT, PT, SLP services  -add recreational therapies  -resting hand splint ordered for tone  -start baclofen 5 bid 11/30. Increase to TID 12/1.  Discussed with pharmacy as patient is elan case, cost monthly medication as a low ~$50/month. will discuss with family if willing to pay. continue aggressive stretch and positioning. Discussed start with patient who is agreeable as tone is interfering with transfers, function and also pain (now 2 person assist OOB to chair). Mild elevation transaminases 11/23, repeat LFTs MON 12/3  -dc tylenol for pain, add naproxen  -add warm compress for bilateral hamstrings    #Cardiovascular  - no PFO on ECHO, EF 74%, HTN  -Losartan.    #s/p Rx UTI, +Enterobacter on Cx  no leukocytosis or fever, BUn/Cr stable  -discussed with hospitalist. given persistent symptoms, recommend continuing for SEVEN day course Abx. Day 7- completed 11/29  voiding well, dc bladder scan  -repeat UA (cath) -negative 11/29    #GI-  -dc tylenol with start of baclofen  -Colace and Senna changed from PRN to standing orders    -# DIET  upgarded to mechanical soft and honey thickened liquids      #Code Status: As per meeting with Palliative Team at [facility]  pt is DNR/DNI,  TRIAL OF NG TUBE IF BECOMES DYSPHASIC, IV FLUIDS AS NEEDED,  ANTIBIOTICS FOR INFECTION, RETURN TO HOSPITAL, FULL MEDICAL MANAGEMENT SHORT OF INTUBATION AND RESUSCITATION    # Case discussed in IDT rounds   Patient has some minimal gains in writing in SLP, continues to require mod assist for transfers, and has developed some icnreased tonicity right side. Due to h/o elevated LFTs and cost of medications on dc, work aggressively on positioning, stretch, modalities, family education. If spasticity worsens, will consider medication after discussion with family  -target dc home 12/7/18 with caregiver assistance min-mod level on dc  -evaluation for home modification as patient will be wheelchair level  -caregiver training      LABS:    CBC BMP 12/3  LFTS 12/3

## 2018-12-01 NOTE — PROGRESS NOTE ADULT - SUBJECTIVE AND OBJECTIVE BOX
Patient is a 86y old  Female who presents with a chief complaint of CVA right HP , aphasia (2018 12:22)      HPI:  86 year old woman RH dominant with multiple vascular risk factors, including age and HTN was transferred from [facility] to [facility] for acute onset of right-sided weakness. She was noted to have developed acute onset of right-sided weakness and language disturbance. She was initially evaluated at [facility] through tele-stroke and was treated with IV tPA (Admission NIHSS 22). MRI brain showed left CYNDEE and MCA distribution infarct with mild hemorrhagic transformation (HI 1). 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    During hospital stay was neurologically without acute change: remains with global aphasia, right hemiplegia . Permissive hypertension was slowly titrated to normotension. Course was complicated by urinary retention which was managed with PRN straight catheterization. MBS was performed and patient was placed on a Dysphagia 1 honey diet which has been well-tolerated. Lipitor discontinued due to admission LDL of 104. Family meeting to discuss goals of care done on  2pm, family decided code status DNR/DNI. Discussed and completed MOLST form. Briefly,  pt is DNR/DNI,  TRIAL OF NG TUBE IF BECOMES DYSPHASIC, IV FLUIDS AS NEEDED,  ANTIBIOTICS FOR INFECTION, RETURN TO HOSPITAL, FULL MEDICAL MANAGEMENT SHORT OF INTUBATION AND RESUSCITATION. Cardiac monitoring revealed no events. TTE shows EF 74% no evidence of thrombus or PFO.  Deemed medically stable for acute rehab. (15 Nov 2018 16:23)      PAST MEDICAL & SURGICAL HISTORY:  HTN (hypertension)  Hypertension  S/P appendectomy      MEDICATIONS  (STANDING):  aspirin  chewable 81 milliGRAM(s) Oral daily  atorvastatin 40 milliGRAM(s) Oral at bedtime  baclofen 5 milliGRAM(s) Oral two times a day  bisacodyl 5 milliGRAM(s) Oral at bedtime  docusate sodium 100 milliGRAM(s) Oral three times a day  enoxaparin Injectable 40 milliGRAM(s) SubCutaneous daily  FLUoxetine 20 milliGRAM(s) Oral daily  losartan 100 milliGRAM(s) Oral daily  nystatin    Suspension 969373 Unit(s) Oral three times a day  polyethylene glycol 3350 17 Gram(s) Oral daily  saccharomyces boulardii 250 milliGRAM(s) Oral two times a day  senna 2 Tablet(s) Oral at bedtime    MEDICATIONS  (PRN):  acetaminophen   Tablet .. 650 milliGRAM(s) Oral every 6 hours PRN Temp greater or equal to 38C (100.4F), Mild Pain (1 - 3)  bisacodyl Suppository 10 milliGRAM(s) Rectal daily PRN Constipation  phenol 1.4% (CHLORASEPTIC) Oral Spray 1 Spray(s) Topical every 4 hours PRN sore throat pain      Allergies    No Known Allergies    Intolerances        TODAY'S SUBJECTIVE & REVIEW OF SYMPTOMS:    Patient looks mildly distressed due to right sided pain, spasticity    PHYSICAL EXAM  86y  Vital Signs Last 24 Hrs  T(C): 37.1 (01 Dec 2018 08:29), Max: 37.1 (01 Dec 2018 08:29)  T(F): 98.8 (01 Dec 2018 08:29), Max: 98.8 (01 Dec 2018 08:)  HR: 90 (01 Dec 2018 08:29) (90 - 96)  BP: 134/69 (01 Dec 2018 08:29) (122/70 - 140/72)  BP(mean): --  RR: 15 (01 Dec 2018 08:29) (15 - 16)  SpO2: 98% (01 Dec 2018 08:29) (96% - 98%)  Daily     Daily     General: eyes open, globally aphasic, tries to use hand gestures. More guarding with PROM RUe an dLe, attempts to vocalize    Resp: clear bilaterally no R/R/W  Cardio: Nl S1 and S 2 regular  Abdomen:  +BS soft, TN ND  Extremities: +extensor tone in addition to flexor right hip and knee  no calf swelling +soft NT  RUE flexor tone marlena shoulder, elbow    Skin: intact      RECENT LABS:                Urinalysis Basic - ( 2018 18:21 )    Color: Yellow / Appearance: Clear / S.020 / pH: x  Gluc: x / Ketone: Negative  / Bili: Small / Urobili: 1   Blood: x / Protein: Negative / Nitrite: Negative   Leuk Esterase: Negative / RBC: Negative /HPF / WBC Negative /HPF   Sq Epi: x / Non Sq Epi: Neg.-Few / Bacteria: Negative /HPF          CAPILLARY BLOOD GLUCOSE        IMPRESSION AND PLAN:

## 2018-12-02 PROCEDURE — 99232 SBSQ HOSP IP/OBS MODERATE 35: CPT

## 2018-12-02 RX ADMIN — Medication 500000 UNIT(S): at 13:22

## 2018-12-02 RX ADMIN — Medication 100 MILLIGRAM(S): at 13:22

## 2018-12-02 RX ADMIN — Medication 5 MILLIGRAM(S): at 21:36

## 2018-12-02 RX ADMIN — Medication 500000 UNIT(S): at 21:36

## 2018-12-02 RX ADMIN — ENOXAPARIN SODIUM 40 MILLIGRAM(S): 100 INJECTION SUBCUTANEOUS at 11:29

## 2018-12-02 RX ADMIN — LOSARTAN POTASSIUM 100 MILLIGRAM(S): 100 TABLET, FILM COATED ORAL at 05:13

## 2018-12-02 RX ADMIN — Medication 81 MILLIGRAM(S): at 11:29

## 2018-12-02 RX ADMIN — Medication 250 MILLIGRAM(S): at 05:13

## 2018-12-02 RX ADMIN — Medication 100 MILLIGRAM(S): at 05:13

## 2018-12-02 RX ADMIN — Medication 5 MILLIGRAM(S): at 05:12

## 2018-12-02 RX ADMIN — Medication 500000 UNIT(S): at 05:13

## 2018-12-02 RX ADMIN — Medication 20 MILLIGRAM(S): at 11:28

## 2018-12-02 RX ADMIN — Medication 5 MILLIGRAM(S): at 13:22

## 2018-12-02 RX ADMIN — ATORVASTATIN CALCIUM 40 MILLIGRAM(S): 80 TABLET, FILM COATED ORAL at 21:35

## 2018-12-02 RX ADMIN — Medication 250 MILLIGRAM(S): at 17:30

## 2018-12-02 NOTE — PROGRESS NOTE ADULT - ASSESSMENT
ASSESSMENT/PLAN  86 year-old woman with a PMH of HTN found to have left ICA (MCA + CYNDEE) stroke s/p tPA  with dense right HP, global aphasia, dysphagia, hemisensory deficits    # Left ICA stroke unknown etiology- with global aphasia and functional quadriplegia  -Aspirin, Statin discontinued as stroke not thought to be of atherosclerotic origin  -continue Fluoxetine  -Continue OT, PT, SLP services  -add recreational therapies  -resting hand splint ordered for tone  -start baclofen 5 bid 11/30. Increase to TID 12/1.  Discussed with pharmacy as patient is elan case, cost monthly medication as a low ~$50/month. will discuss with family if willing to pay. continue aggressive stretch and positioning. Mild elevation transaminases 11/23, repeat LFTs MON 12/3. If stable, consider increasing dose. no excessive sedation noted  -dc tylenol for pain, add naproxen 12/1  -add warm compress for bilateral hamstrings  -consider splinting, including KI, PRAFO    #Cardiovascular  - no PFO on ECHO, EF 74%, HTN  -Losartan.    #s/p Rx UTI, +Enterobacter on Cx  -completed Rx 11/29    #GI-  -dc tylenol with start of baclofen  -Colace and Senna changed from PRN to standing orders    -# DIET  upgarded to mechanical soft and honey thickened liquids      #Code Status: As per meeting with Palliative Team at [facility]  pt is DNR/DNI,  TRIAL OF NG TUBE IF BECOMES DYSPHASIC, IV FLUIDS AS NEEDED,  ANTIBIOTICS FOR INFECTION, RETURN TO HOSPITAL, FULL MEDICAL MANAGEMENT SHORT OF INTUBATION AND RESUSCITATION    # Case discussed in IDT rounds   Patient has some minimal gains in writing in SLP, continues to require mod assist for transfers, and has developed some icnreased tonicity right side. Due to h/o elevated LFTs and cost of medications on dc, work aggressively on positioning, stretch, modalities, family education. If spasticity worsens, will consider medication after discussion with family  -target dc home 12/7/18 with caregiver assistance min-mod level on dc  -evaluation for home modification as patient will be wheelchair level  -caregiver training      LABS:    CBC BMP 12/3  LFTS 12/3

## 2018-12-02 NOTE — PROGRESS NOTE ADULT - SUBJECTIVE AND OBJECTIVE BOX
Patient is a 86y old  Female who presents with a chief complaint of CVA right HP , aphasia (01 Dec 2018 12:00)      HPI:  86 year old woman RH dominant with multiple vascular risk factors, including age and HTN was transferred from [facility] to [facility] for acute onset of right-sided weakness. She was noted to have developed acute onset of right-sided weakness and language disturbance. She was initially evaluated at [facility] through tele-stroke and was treated with IV tPA (Admission NIHSS 22). MRI brain showed left CYNDEE and MCA distribution infarct with mild hemorrhagic transformation (HI 1). 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    During hospital stay was neurologically without acute change: remains with global aphasia, right hemiplegia . Permissive hypertension was slowly titrated to normotension. Course was complicated by urinary retention which was managed with PRN straight catheterization. MBS was performed and patient was placed on a Dysphagia 1 honey diet which has been well-tolerated. Lipitor discontinued due to admission LDL of 104. Family meeting to discuss goals of care done on 11/8 2pm, family decided code status DNR/DNI. Discussed and completed MOLST form. Briefly,  pt is DNR/DNI,  TRIAL OF NG TUBE IF BECOMES DYSPHASIC, IV FLUIDS AS NEEDED,  ANTIBIOTICS FOR INFECTION, RETURN TO HOSPITAL, FULL MEDICAL MANAGEMENT SHORT OF INTUBATION AND RESUSCITATION. Cardiac monitoring revealed no events. TTE shows EF 74% no evidence of thrombus or PFO.  Deemed medically stable for acute rehab. (15 Nov 2018 16:23)      PAST MEDICAL & SURGICAL HISTORY:  HTN (hypertension)  Hypertension  S/P appendectomy      MEDICATIONS  (STANDING):  aspirin  chewable 81 milliGRAM(s) Oral daily  atorvastatin 40 milliGRAM(s) Oral at bedtime  baclofen 5 milliGRAM(s) Oral three times a day  bisacodyl 5 milliGRAM(s) Oral at bedtime  docusate sodium 100 milliGRAM(s) Oral three times a day  enoxaparin Injectable 40 milliGRAM(s) SubCutaneous daily  FLUoxetine 20 milliGRAM(s) Oral daily  losartan 100 milliGRAM(s) Oral daily  nystatin    Suspension 784148 Unit(s) Oral three times a day  polyethylene glycol 3350 17 Gram(s) Oral daily  saccharomyces boulardii 250 milliGRAM(s) Oral two times a day  senna 2 Tablet(s) Oral at bedtime    MEDICATIONS  (PRN):  bisacodyl Suppository 10 milliGRAM(s) Rectal daily PRN Constipation  naproxen 250 milliGRAM(s) Oral two times a day PRN Moderate Pain (4 - 6)  phenol 1.4% (CHLORASEPTIC) Oral Spray 1 Spray(s) Topical every 4 hours PRN sore throat pain      Allergies    No Known Allergies    Intolerances        TODAY'S SUBJECTIVE & REVIEW OF SYMPTOMS:    Patient in mild distress due to spasticity and pain RUe and LE, mostly occuring with movement    PHYSICAL EXAM  86y  Vital Signs Last 24 Hrs  T(C): 36.7 (02 Dec 2018 08:23), Max: 37.1 (01 Dec 2018 22:52)  T(F): 98 (02 Dec 2018 08:23), Max: 98.8 (01 Dec 2018 22:52)  HR: 90 (02 Dec 2018 08:23) (82 - 90)  BP: 113/64 (02 Dec 2018 08:23) (102/59 - 132/62)  BP(mean): --  RR: 14 (02 Dec 2018 08:23) (14 - 14)  SpO2: 94% (02 Dec 2018 08:23) (94% - 97%)  Daily     Daily     General: sititng in wheelchair, global aphasia, alert    Resp: clear bilaterally no R/R/W  Cardio: RRR HR 90  Abdomen:  soft Nt ND +BS  Extremities: right UE: Yazmin 1+ no hand swelling  figner and wrist flexor tone, IP <MCP fingers. Has splint and hand roll  right hamstring tightness and discomfort quad/hip. sustained streth -15 degrees full extension  +PF tone ankle        RECENT LABS:                      CAPILLARY BLOOD GLUCOSE        IMPRESSION AND PLAN:

## 2018-12-03 LAB
ALBUMIN SERPL ELPH-MCNC: 3.3 G/DL — SIGNIFICANT CHANGE UP (ref 3.3–5)
ALP SERPL-CCNC: 76 U/L — SIGNIFICANT CHANGE UP (ref 40–120)
ALT FLD-CCNC: 140 U/L DA — HIGH (ref 10–45)
ANION GAP SERPL CALC-SCNC: 7 MMOL/L — SIGNIFICANT CHANGE UP (ref 5–17)
AST SERPL-CCNC: 100 U/L — HIGH (ref 10–40)
BILIRUB DIRECT SERPL-MCNC: 0.2 MG/DL — SIGNIFICANT CHANGE UP (ref 0–0.2)
BILIRUB INDIRECT FLD-MCNC: 0.6 MG/DL — SIGNIFICANT CHANGE UP (ref 0.2–1)
BILIRUB SERPL-MCNC: 0.8 MG/DL — SIGNIFICANT CHANGE UP (ref 0.2–1.2)
BUN SERPL-MCNC: 9 MG/DL — SIGNIFICANT CHANGE UP (ref 7–23)
CALCIUM SERPL-MCNC: 9.5 MG/DL — SIGNIFICANT CHANGE UP (ref 8.4–10.5)
CHLORIDE SERPL-SCNC: 102 MMOL/L — SIGNIFICANT CHANGE UP (ref 96–108)
CO2 SERPL-SCNC: 28 MMOL/L — SIGNIFICANT CHANGE UP (ref 22–31)
CREAT SERPL-MCNC: 0.66 MG/DL — SIGNIFICANT CHANGE UP (ref 0.5–1.3)
GLUCOSE SERPL-MCNC: 116 MG/DL — HIGH (ref 70–99)
HCT VFR BLD CALC: 27.7 % — LOW (ref 34.5–45)
HGB BLD-MCNC: 9.3 G/DL — LOW (ref 11.5–15.5)
MCHC RBC-ENTMCNC: 33.5 GM/DL — SIGNIFICANT CHANGE UP (ref 32–36)
MCHC RBC-ENTMCNC: 33.7 PG — SIGNIFICANT CHANGE UP (ref 27–34)
MCV RBC AUTO: 100.6 FL — HIGH (ref 80–100)
PLATELET # BLD AUTO: 224 K/UL — SIGNIFICANT CHANGE UP (ref 150–400)
POTASSIUM SERPL-MCNC: 3.8 MMOL/L — SIGNIFICANT CHANGE UP (ref 3.5–5.3)
POTASSIUM SERPL-SCNC: 3.8 MMOL/L — SIGNIFICANT CHANGE UP (ref 3.5–5.3)
PROT SERPL-MCNC: 6.1 G/DL — SIGNIFICANT CHANGE UP (ref 6–8.3)
RBC # BLD: 2.76 M/UL — LOW (ref 3.8–5.2)
RBC # FLD: 11.8 % — SIGNIFICANT CHANGE UP (ref 10.3–14.5)
SODIUM SERPL-SCNC: 137 MMOL/L — SIGNIFICANT CHANGE UP (ref 135–145)
WBC # BLD: 6.4 K/UL — SIGNIFICANT CHANGE UP (ref 3.8–10.5)
WBC # FLD AUTO: 6.4 K/UL — SIGNIFICANT CHANGE UP (ref 3.8–10.5)

## 2018-12-03 PROCEDURE — 99233 SBSQ HOSP IP/OBS HIGH 50: CPT

## 2018-12-03 PROCEDURE — 99232 SBSQ HOSP IP/OBS MODERATE 35: CPT

## 2018-12-03 RX ORDER — BACLOFEN 100 %
5 POWDER (GRAM) MISCELLANEOUS
Qty: 0 | Refills: 0 | Status: DISCONTINUED | OUTPATIENT
Start: 2018-12-03 | End: 2018-12-07

## 2018-12-03 RX ADMIN — ATORVASTATIN CALCIUM 40 MILLIGRAM(S): 80 TABLET, FILM COATED ORAL at 21:10

## 2018-12-03 RX ADMIN — SENNA PLUS 2 TABLET(S): 8.6 TABLET ORAL at 21:11

## 2018-12-03 RX ADMIN — Medication 250 MILLIGRAM(S): at 21:11

## 2018-12-03 RX ADMIN — Medication 250 MILLIGRAM(S): at 21:12

## 2018-12-03 RX ADMIN — Medication 81 MILLIGRAM(S): at 12:03

## 2018-12-03 RX ADMIN — Medication 500000 UNIT(S): at 13:03

## 2018-12-03 RX ADMIN — Medication 250 MILLIGRAM(S): at 05:54

## 2018-12-03 RX ADMIN — LOSARTAN POTASSIUM 100 MILLIGRAM(S): 100 TABLET, FILM COATED ORAL at 05:53

## 2018-12-03 RX ADMIN — Medication 5 MILLIGRAM(S): at 13:03

## 2018-12-03 RX ADMIN — Medication 100 MILLIGRAM(S): at 21:11

## 2018-12-03 RX ADMIN — ENOXAPARIN SODIUM 40 MILLIGRAM(S): 100 INJECTION SUBCUTANEOUS at 12:03

## 2018-12-03 RX ADMIN — Medication 250 MILLIGRAM(S): at 17:35

## 2018-12-03 RX ADMIN — Medication 500000 UNIT(S): at 05:54

## 2018-12-03 RX ADMIN — Medication 500000 UNIT(S): at 21:11

## 2018-12-03 RX ADMIN — Medication 5 MILLIGRAM(S): at 21:11

## 2018-12-03 RX ADMIN — Medication 20 MILLIGRAM(S): at 12:02

## 2018-12-03 RX ADMIN — Medication 5 MILLIGRAM(S): at 05:53

## 2018-12-03 RX ADMIN — Medication 100 MILLIGRAM(S): at 13:03

## 2018-12-03 NOTE — PROGRESS NOTE ADULT - ASSESSMENT
ASSESSMENT/PLAN  86 year-old woman with a PMH of HTN found to have left ICA (MCA + CYNDEE) stroke s/p tPA  with dense right HP, global aphasia, dysphagia, hemisensory deficits    # Left ICA stroke unknown etiology- with global aphasia and functional quadriplegia  -Aspirin, Statin discontinued as stroke not thought to be of atherosclerotic origin  -continue Fluoxetine  -Continue OT, PT, SLP services  -add recreational therapies  -resting hand splint ordered for tone  -start baclofen 5 bid 11/30. Increase to TID 12/1.  Discussed with pharmacy as patient is elan case, cost monthly medication as a low ~$50/month. will discuss with family if willing to pay. continue aggressive stretch and positioning. Mild elevation transaminases 11/23, repeat LFTs MON 12/3. If stable, consider increasing dose. no excessive sedation noted  -dc tylenol for pain, add naproxen 12/1  -add warm compress for bilateral hamstrings  -consider splinting, including KI, PRAFO    #Cardiovascular  - no PFO on ECHO, EF 74%, HTN  -Losartan.    #s/p Rx UTI, +Enterobacter on Cx  -completed Rx 11/29    #GI-  -dc tylenol with start of baclofen  -Colace and Senna changed from PRN to standing orders    -# DIET  upgarded to mechanical soft and honey thickened liquids      #Code Status: As per meeting with Palliative Team at [facility]  pt is DNR/DNI,  TRIAL OF NG TUBE IF BECOMES DYSPHASIC, IV FLUIDS AS NEEDED,  ANTIBIOTICS FOR INFECTION, RETURN TO HOSPITAL, FULL MEDICAL MANAGEMENT SHORT OF INTUBATION AND RESUSCITATION    # Case discussed in IDT rounds   Patient has some minimal gains in writing in SLP, continues to require mod assist for transfers, and has developed some icnreased tonicity right side. Due to h/o elevated LFTs and cost of medications on dc, work aggressively on positioning, stretch, modalities, family education. If spasticity worsens, will consider medication after discussion with family  -target dc home 12/7/18 with caregiver assistance min-mod level on dc  -evaluation for home modification as patient will be wheelchair level  -caregiver training      LABS:    CBC BMP 12/3  LFTS 12/3 ASSESSMENT/PLAN  86 year-old woman with a PMH of HTN found to have left ICA (MCA + CYNDEE) stroke s/p tPA  with dense right HP, global aphasia, dysphagia, hemisensory deficits    # Left ICA stroke unknown etiology- with global aphasia and functional quadriplegia  -Aspirin, Statin discontinued as stroke not thought to be of atherosclerotic origin  -continue Fluoxetine  -Continue OT, PT, SLP services  -add recreational therapies  -resting hand splint ordered for tone  - -dc tylenol for pain, add naproxen 12/1  -spasticity improved on balcofen 5 TID. discussed with son via , who is agreeable to start of medication, outside costs, and need to f/u LFTs and avoid quick cessation med. however LFT's ELEVATED. Will reduce dose 5 bid, repeat LFTs WED 12/5    #Cardiovascular  - no PFO on ECHO, EF 74%, HTN  -Losartan.    #s/p Rx UTI, +Enterobacter on Cx  -completed Rx 11/29    #GI-  -dc tylenol with start of baclofen  -Colace and Senna changed from PRN to standing orders    -# DIET  upgarded to mechanical soft and honey thickened liquids      #Code Status: As per meeting with Palliative Team at [facility]  pt is DNR/DNI,  TRIAL OF NG TUBE IF BECOMES DYSPHASIC, IV FLUIDS AS NEEDED,  ANTIBIOTICS FOR INFECTION, RETURN TO HOSPITAL, FULL MEDICAL MANAGEMENT SHORT OF INTUBATION AND RESUSCITATION    # Case discussed in IDT rounds   Patient has some minimal gains in writing in SLP, continues to require mod assist for transfers, and has developed some icnreased tonicity right side. Due to h/o elevated LFTs and cost of medications on dc, work aggressively on positioning, stretch, modalities, family education. If spasticity worsens, will consider medication after discussion with family  -target dc home 12/7/18 with caregiver assistance min-mod level on dc  -evaluation for home modification as patient will be wheelchair level  -caregiver training      LABS:    LFTS 12/5

## 2018-12-03 NOTE — PROGRESS NOTE ADULT - SUBJECTIVE AND OBJECTIVE BOX
Patient is a 86y old  Female who presents with a chief complaint of CVA right HP , aphasia (02 Dec 2018 10:39)      HPI:  86 year old woman RH dominant with multiple vascular risk factors, including age and HTN was transferred from [facility] to [facility] for acute onset of right-sided weakness. She was noted to have developed acute onset of right-sided weakness and language disturbance. She was initially evaluated at [facility] through tele-stroke and was treated with IV tPA (Admission NIHSS 22). MRI brain showed left CYNDEE and MCA distribution infarct with mild hemorrhagic transformation (HI 1). 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    During hospital stay was neurologically without acute change: remains with global aphasia, right hemiplegia . Permissive hypertension was slowly titrated to normotension. Course was complicated by urinary retention which was managed with PRN straight catheterization. MBS was performed and patient was placed on a Dysphagia 1 honey diet which has been well-tolerated. Lipitor discontinued due to admission LDL of 104. Family meeting to discuss goals of care done on 11/8 2pm, family decided code status DNR/DNI. Discussed and completed MOLST form. Briefly,  pt is DNR/DNI,  TRIAL OF NG TUBE IF BECOMES DYSPHASIC, IV FLUIDS AS NEEDED,  ANTIBIOTICS FOR INFECTION, RETURN TO HOSPITAL, FULL MEDICAL MANAGEMENT SHORT OF INTUBATION AND RESUSCITATION. Cardiac monitoring revealed no events. TTE shows EF 74% no evidence of thrombus or PFO.  Deemed medically stable for acute rehab. (15 Nov 2018 16:23)      PAST MEDICAL & SURGICAL HISTORY:  HTN (hypertension)  Hypertension  S/P appendectomy      MEDICATIONS  (STANDING):  aspirin  chewable 81 milliGRAM(s) Oral daily  atorvastatin 40 milliGRAM(s) Oral at bedtime  baclofen 5 milliGRAM(s) Oral three times a day  bisacodyl 5 milliGRAM(s) Oral at bedtime  docusate sodium 100 milliGRAM(s) Oral three times a day  enoxaparin Injectable 40 milliGRAM(s) SubCutaneous daily  FLUoxetine 20 milliGRAM(s) Oral daily  losartan 100 milliGRAM(s) Oral daily  nystatin    Suspension 820848 Unit(s) Oral three times a day  polyethylene glycol 3350 17 Gram(s) Oral daily  saccharomyces boulardii 250 milliGRAM(s) Oral two times a day  senna 2 Tablet(s) Oral at bedtime    MEDICATIONS  (PRN):  bisacodyl Suppository 10 milliGRAM(s) Rectal daily PRN Constipation  naproxen 250 milliGRAM(s) Oral two times a day PRN Moderate Pain (4 - 6)  phenol 1.4% (CHLORASEPTIC) Oral Spray 1 Spray(s) Topical every 4 hours PRN sore throat pain      Allergies    No Known Allergies    Intolerances        TODAY'S SUBJECTIVE & REVIEW OF SYMPTOMS:    Patient seen with assistance language line  in [place] [facility] #[id number] and son at bedside. Patient still in idscomfort, but apperas LESS so today. leg pain less guarding. no lability. No excessive sedation on baclofen    PHYSICAL EXAM  86y  Vital Signs Last 24 Hrs  T(C): 36.7 (03 Dec 2018 07:40), Max: 36.7 (03 Dec 2018 07:40)  T(F): 98 (03 Dec 2018 07:40), Max: 98 (03 Dec 2018 07:40)  HR: 91 (03 Dec 2018 07:40) (86 - 92)  BP: 132/65 (03 Dec 2018 07:40) (120/70 - 140/72)  BP(mean): --  RR: 14 (03 Dec 2018 07:40) (14 - 16)  SpO2: 95% (03 Dec 2018 07:40) (95% - 96%)  Daily     Daily     General: alert. globallly aphasic, uses hand gestures fair consistency but not always accurate, requires repetition NAD  improved sitting balnace in chair, less restless    Resp: clear bilaterally no R?R/W  Cardio: Nl S1 and S2 regular  Abdomen:   Extremities:  Neuro:  Skin:      RECENT LABS:                          9.3    6.4   )-----------( 224      ( 03 Dec 2018 06:50 )             27.7     12-03    137  |  102  |  9   ----------------------------<  116<H>  3.8   |  28  |  0.66    Ca    9.5      03 Dec 2018 06:50    TPro  6.1  /  Alb  3.3  /  TBili  0.8  /  DBili  0.2  /  AST  100<H>  /  ALT  140<H>  /  AlkPhos  76  12-03    LIVER FUNCTIONS - ( 03 Dec 2018 06:50 )  Alb: 3.3 g/dL / Pro: 6.1 g/dL / ALK PHOS: 76 U/L / ALT: 140 U/L DA / AST: 100 U/L / GGT: x                   CAPILLARY BLOOD GLUCOSE        IMPRESSION AND PLAN: Patient is a 86y old  Female who presents with a chief complaint of CVA right HP , aphasia (02 Dec 2018 10:39)      HPI:  86 year old woman RH dominant with multiple vascular risk factors, including age and HTN was transferred from [facility] to [facility] for acute onset of right-sided weakness. She was noted to have developed acute onset of right-sided weakness and language disturbance. She was initially evaluated at [facility] through tele-stroke and was treated with IV tPA (Admission NIHSS 22). MRI brain showed left CYDNEE and MCA distribution infarct with mild hemorrhagic transformation (HI 1). 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    During hospital stay was neurologically without acute change: remains with global aphasia, right hemiplegia . Permissive hypertension was slowly titrated to normotension. Course was complicated by urinary retention which was managed with PRN straight catheterization. MBS was performed and patient was placed on a Dysphagia 1 honey diet which has been well-tolerated. Lipitor discontinued due to admission LDL of 104. Family meeting to discuss goals of care done on 11/8 2pm, family decided code status DNR/DNI. Discussed and completed MOLST form. Briefly,  pt is DNR/DNI,  TRIAL OF NG TUBE IF BECOMES DYSPHASIC, IV FLUIDS AS NEEDED,  ANTIBIOTICS FOR INFECTION, RETURN TO HOSPITAL, FULL MEDICAL MANAGEMENT SHORT OF INTUBATION AND RESUSCITATION. Cardiac monitoring revealed no events. TTE shows EF 74% no evidence of thrombus or PFO.  Deemed medically stable for acute rehab. (15 Nov 2018 16:23)      PAST MEDICAL & SURGICAL HISTORY:  HTN (hypertension)  Hypertension  S/P appendectomy      MEDICATIONS  (STANDING):  aspirin  chewable 81 milliGRAM(s) Oral daily  atorvastatin 40 milliGRAM(s) Oral at bedtime  baclofen 5 milliGRAM(s) Oral three times a day  bisacodyl 5 milliGRAM(s) Oral at bedtime  docusate sodium 100 milliGRAM(s) Oral three times a day  enoxaparin Injectable 40 milliGRAM(s) SubCutaneous daily  FLUoxetine 20 milliGRAM(s) Oral daily  losartan 100 milliGRAM(s) Oral daily  nystatin    Suspension 485639 Unit(s) Oral three times a day  polyethylene glycol 3350 17 Gram(s) Oral daily  saccharomyces boulardii 250 milliGRAM(s) Oral two times a day  senna 2 Tablet(s) Oral at bedtime    MEDICATIONS  (PRN):  bisacodyl Suppository 10 milliGRAM(s) Rectal daily PRN Constipation  naproxen 250 milliGRAM(s) Oral two times a day PRN Moderate Pain (4 - 6)  phenol 1.4% (CHLORASEPTIC) Oral Spray 1 Spray(s) Topical every 4 hours PRN sore throat pain      Allergies    No Known Allergies    Intolerances        TODAY'S SUBJECTIVE & REVIEW OF SYMPTOMS:    Patient seen with assistance language line  in [place] [facility] #[id number] and son at bedside. Patient still in idscomfort, but apperas LESS so today. leg pain less guarding. no lability. No excessive sedation on baclofen    PHYSICAL EXAM  86y  Vital Signs Last 24 Hrs  T(C): 36.7 (03 Dec 2018 07:40), Max: 36.7 (03 Dec 2018 07:40)  T(F): 98 (03 Dec 2018 07:40), Max: 98 (03 Dec 2018 07:40)  HR: 91 (03 Dec 2018 07:40) (86 - 92)  BP: 132/65 (03 Dec 2018 07:40) (120/70 - 140/72)  BP(mean): --  RR: 14 (03 Dec 2018 07:40) (14 - 16)  SpO2: 95% (03 Dec 2018 07:40) (95% - 96%)  Daily     Daily     General: alert. globallly aphasic, uses hand gestures fair consistency but not always accurate, requires repetition NAD  improved sitting balnace in chair, less restless    Resp: clear bilaterally no R?R/W  Cardio: Nl S1 and S2 regular  Abdomen: soft +BS NT no R/G no suprapubic pain  Extremities: right LegREDUCED tone , improved ROm knee -10 degrees to full extension  less hamstring pain on palpation  calves soft, NT no erythema  less guarding and grimacing with slow stretch        RECENT LABS:                          9.3    6.4   )-----------( 224      ( 03 Dec 2018 06:50 )             27.7     12-03    137  |  102  |  9   ----------------------------<  116<H>  3.8   |  28  |  0.66    Ca    9.5      03 Dec 2018 06:50    TPro  6.1  /  Alb  3.3  /  TBili  0.8  /  DBili  0.2  /  AST  100<H>  /  ALT  140<H>  /  AlkPhos  76  12-03    LIVER FUNCTIONS - ( 03 Dec 2018 06:50 )  Alb: 3.3 g/dL / Pro: 6.1 g/dL / ALK PHOS: 76 U/L / ALT: 140 U/L DA / AST: 100 U/L / GGT: x                   CAPILLARY BLOOD GLUCOSE        IMPRESSION AND PLAN:

## 2018-12-03 NOTE — PROGRESS NOTE ADULT - SUBJECTIVE AND OBJECTIVE BOX
HPI    Pt is a 87 yo F admitted to acute rehab for left ICA CVA  Pt was seen and examined in wheelchair, son at bedside     feels better no co    no chest pain no sob     ros all others are negative     Vital Signs Last 24 Hrs  T(C): 36.7 (03 Dec 2018 07:40), Max: 36.7 (03 Dec 2018 07:40)  T(F): 98 (03 Dec 2018 07:40), Max: 98 (03 Dec 2018 07:40)  HR: 91 (03 Dec 2018 07:40) (86 - 92)  BP: 132/65 (03 Dec 2018 07:40) (120/70 - 140/72)  BP(mean): --  RR: 14 (03 Dec 2018 07:40) (14 - 16)  SpO2: 95% (03 Dec 2018 07:40) (95% - 96%)    MEDICATIONS  (STANDING):  aspirin  chewable 81 milliGRAM(s) Oral daily  atorvastatin 40 milliGRAM(s) Oral at bedtime  baclofen 5 milliGRAM(s) Oral three times a day  bisacodyl 5 milliGRAM(s) Oral at bedtime  docusate sodium 100 milliGRAM(s) Oral three times a day  enoxaparin Injectable 40 milliGRAM(s) SubCutaneous daily  FLUoxetine 20 milliGRAM(s) Oral daily  losartan 100 milliGRAM(s) Oral daily  nystatin    Suspension 564025 Unit(s) Oral three times a day  polyethylene glycol 3350 17 Gram(s) Oral daily  saccharomyces boulardii 250 milliGRAM(s) Oral two times a day  senna 2 Tablet(s) Oral at bedtime    MEDICATIONS  (PRN):  bisacodyl Suppository 10 milliGRAM(s) Rectal daily PRN Constipation  naproxen 250 milliGRAM(s) Oral two times a day PRN Moderate Pain (4 - 6)  phenol 1.4% (CHLORASEPTIC) Oral Spray 1 Spray(s) Topical every 4 hours PRN sore throat pain    CBC Full  -  ( 03 Dec 2018 06:50 )  WBC Count : 6.4 K/uL  Hemoglobin : 9.3 g/dL  Hematocrit : 27.7 %  Platelet Count - Automated : 224 K/uL  Mean Cell Volume : 100.6 fl  Mean Cell Hemoglobin : 33.7 pg  Mean Cell Hemoglobin Concentration : 33.5 gm/dL  Auto Neutrophil # : x  Auto Lymphocyte # : x  Auto Monocyte # : x  Auto Eosinophil # : x  Auto Basophil # : x  Auto Neutrophil % : x  Auto Lymphocyte % : x  Auto Monocyte % : x  Auto Eosinophil % : x  Auto Basophil % : x

## 2018-12-03 NOTE — PROGRESS NOTE ADULT - ASSESSMENT
HPI    Pt is a 87yo F admitted to acute rehab for left ICA CVA, improving right le weakness per son     *Left ICA ischemic CVA   cad risk management   Cont PT/OT/SLP   CAD risk management   Fluoxetine for motor improvement   fall and aspiration risk precautions     *Essential HTN  Controlled  Cont Losartan   low sodium diet     *dysphagia with aphasia  Pureed w honey thickened liquid  SLP  aspiration precautions     *DVT ppx   Lovenox     high risk for morbidity, mortality secondary to the above mentioned medical conditions   vte proph   reviewed with patient son plan of care

## 2018-12-04 PROCEDURE — 99233 SBSQ HOSP IP/OBS HIGH 50: CPT

## 2018-12-04 PROCEDURE — 99232 SBSQ HOSP IP/OBS MODERATE 35: CPT

## 2018-12-04 PROCEDURE — 71101 X-RAY EXAM UNILAT RIBS/CHEST: CPT | Mod: 26

## 2018-12-04 RX ADMIN — Medication 500000 UNIT(S): at 05:35

## 2018-12-04 RX ADMIN — Medication 100 MILLIGRAM(S): at 05:35

## 2018-12-04 RX ADMIN — SENNA PLUS 2 TABLET(S): 8.6 TABLET ORAL at 21:54

## 2018-12-04 RX ADMIN — Medication 100 MILLIGRAM(S): at 21:54

## 2018-12-04 RX ADMIN — Medication 1 TABLET(S): at 12:10

## 2018-12-04 RX ADMIN — ENOXAPARIN SODIUM 40 MILLIGRAM(S): 100 INJECTION SUBCUTANEOUS at 12:10

## 2018-12-04 RX ADMIN — Medication 250 MILLIGRAM(S): at 05:35

## 2018-12-04 RX ADMIN — Medication 250 MILLIGRAM(S): at 17:29

## 2018-12-04 RX ADMIN — Medication 100 MILLIGRAM(S): at 13:22

## 2018-12-04 RX ADMIN — Medication 81 MILLIGRAM(S): at 12:10

## 2018-12-04 RX ADMIN — Medication 20 MILLIGRAM(S): at 12:10

## 2018-12-04 RX ADMIN — POLYETHYLENE GLYCOL 3350 17 GRAM(S): 17 POWDER, FOR SOLUTION ORAL at 12:10

## 2018-12-04 RX ADMIN — Medication 5 MILLIGRAM(S): at 21:53

## 2018-12-04 RX ADMIN — Medication 5 MILLIGRAM(S): at 17:29

## 2018-12-04 RX ADMIN — LOSARTAN POTASSIUM 100 MILLIGRAM(S): 100 TABLET, FILM COATED ORAL at 05:35

## 2018-12-04 RX ADMIN — Medication 250 MILLIGRAM(S): at 21:54

## 2018-12-04 RX ADMIN — Medication 5 MILLIGRAM(S): at 05:34

## 2018-12-04 RX ADMIN — Medication 250 MILLIGRAM(S): at 22:15

## 2018-12-04 RX ADMIN — Medication 500000 UNIT(S): at 13:22

## 2018-12-04 RX ADMIN — Medication 500000 UNIT(S): at 21:53

## 2018-12-04 NOTE — PROGRESS NOTE ADULT - ASSESSMENT
HPI    Pt is a 85yo F admitted to acute rehab for left ICA CVA, improving right le weakness per son     *Left ICA ischemic CVA   cad risk management   Cont PT/OT/SLP   CAD risk management   Fluoxetine for motor improvement   fall and aspiration risk precautions     *Essential HTN  Controlled  Cont Losartan   low sodium diet     *dysphagia with aphasia  Pureed w honey thickened liquid  SLP  aspiration precautions     *DVT ppx   Lovenox     high risk for morbidity, mortality secondary to the above mentioned medical conditions   vte proph   reviewed with patient son plan of care HPI    Pt is a 85 yo F admitted to acute rehab for left ICA CVA, improving right le weakness per son     *Left ICA ischemic CVA   cad risk management   Cont PT/OT/SLP   CAD risk management   Fluoxetine for motor improvement   fall and aspiration risk precautions     LFT abn   no acute liver failure   review with neuro to see if statin can be dc.   trend out lft over time     *Essential HTN  Controlled  Cont Losartan   low sodium diet     *dysphagia with aphasia  Pureed w honey thickened liquid  SLP  aspiration precautions     *DVT ppx   Lovenox     high risk for morbidity, mortality secondary to the above mentioned medical conditions   vte proph   reviewed with patient son plan of care   reviewed with the rehab team plan of care HPI    Pt is a 87 yo F admitted to acute rehab for left ICA CVA, improving right le weakness per son     *Left ICA ischemic CVA   cad risk management   Cont PT/OT/SLP   CAD risk management   Fluoxetine for motor improvement   fall and aspiration risk precautions     right sided hematoma   preumably related to hit side in shower or chair   check rib series   pain control   continue vte proph for now, if hematoma worsens will need to dc the lovenox   currently the risk for vte > than adverse side effect     LFT abn   no acute liver failure   review with neuro to see if statin can be dc.   trend out lft over time     *Essential HTN  Controlled  Cont Losartan   low sodium diet     *dysphagia with aphasia  Pureed w honey thickened liquid  SLP  aspiration precautions     *DVT ppx   Lovenox     high risk for morbidity, mortality secondary to the above mentioned medical conditions   vte proph   reviewed with patient son plan of care   reviewed with the rehab team plan of care

## 2018-12-04 NOTE — PROGRESS NOTE ADULT - SUBJECTIVE AND OBJECTIVE BOX
HPI    Pt is a 85 yo F admitted to acute rehab for left ICA CVA  Pt was seen in the     feels better no co    no chest pain no sob     ros all others are negative     Vital Signs Last 24 Hrs  T(C): 36.6 (04 Dec 2018 07:44), Max: 36.6 (04 Dec 2018 07:44)  T(F): 97.8 (04 Dec 2018 07:44), Max: 97.8 (04 Dec 2018 07:44)  HR: 89 (04 Dec 2018 07:44) (86 - 89)  BP: 131/70 (04 Dec 2018 07:44) (124/70 - 146/66)  BP(mean): --  RR: 15 (04 Dec 2018 07:44) (15 - 16)  SpO2: 97% (04 Dec 2018 07:44) (97% - 97%)    MEDICATIONS  (STANDING):  aspirin  chewable 81 milliGRAM(s) Oral daily  atorvastatin 40 milliGRAM(s) Oral at bedtime  baclofen 5 milliGRAM(s) Oral two times a day  bisacodyl 5 milliGRAM(s) Oral at bedtime  docusate sodium 100 milliGRAM(s) Oral three times a day  enoxaparin Injectable 40 milliGRAM(s) SubCutaneous daily  FLUoxetine 20 milliGRAM(s) Oral daily  losartan 100 milliGRAM(s) Oral daily  multivitamin 1 Tablet(s) Oral daily  nystatin    Suspension 710480 Unit(s) Oral three times a day  polyethylene glycol 3350 17 Gram(s) Oral daily  saccharomyces boulardii 250 milliGRAM(s) Oral two times a day  senna 2 Tablet(s) Oral at bedtime    MEDICATIONS  (PRN):  bisacodyl Suppository 10 milliGRAM(s) Rectal daily PRN Constipation  naproxen 250 milliGRAM(s) Oral two times a day PRN Moderate Pain (4 - 6)  phenol 1.4% (CHLORASEPTIC) Oral Spray 1 Spray(s) Topical every 4 hours PRN sore throat pain      CBC Full  -  ( 03 Dec 2018 06:50 )  WBC Count : 6.4 K/uL  Hemoglobin : 9.3 g/dL  Hematocrit : 27.7 %  Platelet Count - Automated : 224 K/uL  Mean Cell Volume : 100.6 fl  Mean Cell Hemoglobin : 33.7 pg  Mean Cell Hemoglobin Concentration : 33.5 gm/dL  Auto Neutrophil # : x  Auto Lymphocyte # : x  Auto Monocyte # : x  Auto Eosinophil # : x  Auto Basophil # : x  Auto Neutrophil % : x  Auto Lymphocyte % : x  Auto Monocyte % : x  Auto Eosinophil % : x  Auto Basophil % : x

## 2018-12-04 NOTE — PROGRESS NOTE ADULT - ASSESSMENT
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 Was restarted which may be source of elevated LFTs. will hold and f/u with neuro. LFTs 12/5  -continue Fluoxetine  -Continue OT, PT, SLP services  -add recreational therapies  -resting hand splint ordered for tone  - -dc tylenol for pain, add naproxen 12/1  -spasticity improved on balcofen 5 TID. discussed with son via , who is agreeable to start of medication, outside costs, and need to f/u LFTs and avoid quick cessation med. however LFT's ELEVATED. Will reduce dose 5 bid, repeat LFTs WED 12/5. If improved with discontinuation statin, may increase back to TID    #Cardiovascular  - no PFO on ECHO, EF 74%, HTN  -Losartan.    #s/p Rx UTI, +Enterobacter on Cx  -completed Rx 11/29    #GI-  -dc tylenol with start of baclofen  -Colace and Senna changed from PRN to standing orders    -# DIET  upgarded to mechanical soft and honey thickened liquids      #Code Status: As per meeting with Palliative Team at [facility]  pt is DNR/DNI,  TRIAL OF NG TUBE IF BECOMES DYSPHASIC, IV FLUIDS AS NEEDED,  ANTIBIOTICS FOR INFECTION, RETURN TO HOSPITAL, FULL MEDICAL MANAGEMENT SHORT OF INTUBATION AND RESUSCITATION    # Case discussed in IDT rounds 12/4  Caregiver training in progress. patient requires 1-2 person assist for transfers , is non ambulatory, and currently working on medical follow up, equipment needs, home modifications and caregiver training in preparation for dc home. Will extend dc date to address these issues from 12/7/18 to 12/12/18      LABS:    LFTS 12/5  neuro f/u re: dc statin (previous hospital)

## 2018-12-04 NOTE — PROGRESS NOTE ADULT - SUBJECTIVE AND OBJECTIVE BOX
Patient is a 86y old  Female who presents with a chief complaint of CVA right HP , aphasia (04 Dec 2018 11:08)      HPI:  86 year old woman RH dominant with multiple vascular risk factors, including age and HTN was transferred from [facility] to [facility] for acute onset of right-sided weakness. She was noted to have developed acute onset of right-sided weakness and language disturbance. She was initially evaluated at [facility] through tele-stroke and was treated with IV tPA (Admission NIHSS 22). MRI brain showed left CYNDEE and MCA distribution infarct with mild hemorrhagic transformation (HI 1). 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    During hospital stay was neurologically without acute change: remains with global aphasia, right hemiplegia . Permissive hypertension was slowly titrated to normotension. Course was complicated by urinary retention which was managed with PRN straight catheterization. MBS was performed and patient was placed on a Dysphagia 1 honey diet which has been well-tolerated. Lipitor discontinued due to admission LDL of 104. Family meeting to discuss goals of care done on 11/8 2pm, family decided code status DNR/DNI. Discussed and completed MOLST form. Briefly,  pt is DNR/DNI,  TRIAL OF NG TUBE IF BECOMES DYSPHASIC, IV FLUIDS AS NEEDED,  ANTIBIOTICS FOR INFECTION, RETURN TO HOSPITAL, FULL MEDICAL MANAGEMENT SHORT OF INTUBATION AND RESUSCITATION. Cardiac monitoring revealed no events. TTE shows EF 74% no evidence of thrombus or PFO.  Deemed medically stable for acute rehab. (15 Nov 2018 16:23)      PAST MEDICAL & SURGICAL HISTORY:  HTN (hypertension)  Hypertension  S/P appendectomy      MEDICATIONS  (STANDING):  aspirin  chewable 81 milliGRAM(s) Oral daily  atorvastatin 40 milliGRAM(s) Oral at bedtime  baclofen 5 milliGRAM(s) Oral two times a day  bisacodyl 5 milliGRAM(s) Oral at bedtime  docusate sodium 100 milliGRAM(s) Oral three times a day  enoxaparin Injectable 40 milliGRAM(s) SubCutaneous daily  FLUoxetine 20 milliGRAM(s) Oral daily  losartan 100 milliGRAM(s) Oral daily  multivitamin 1 Tablet(s) Oral daily  nystatin    Suspension 474354 Unit(s) Oral three times a day  polyethylene glycol 3350 17 Gram(s) Oral daily  saccharomyces boulardii 250 milliGRAM(s) Oral two times a day  senna 2 Tablet(s) Oral at bedtime    MEDICATIONS  (PRN):  bisacodyl Suppository 10 milliGRAM(s) Rectal daily PRN Constipation  naproxen 250 milliGRAM(s) Oral two times a day PRN Moderate Pain (4 - 6)  phenol 1.4% (CHLORASEPTIC) Oral Spray 1 Spray(s) Topical every 4 hours PRN sore throat pain      Allergies    No Known Allergies    Intolerances        TODAY'S SUBJECTIVE & REVIEW OF SYMPTOMS:    Patient seen with assistance language line  in O[place]i during SLP session. Patient still very apraxic with hand movements, attempting to use abhijit on phone for yes/no, with some perseveration "no" as well s incorodination and inaccuracy hand movements.     PHYSICAL EXAM  86y  Vital Signs Last 24 Hrs  T(C): 36.6 (04 Dec 2018 07:44), Max: 36.6 (04 Dec 2018 07:44)  T(F): 97.8 (04 Dec 2018 07:44), Max: 97.8 (04 Dec 2018 07:44)  HR: 89 (04 Dec 2018 07:44) (86 - 89)  BP: 131/70 (04 Dec 2018 07:44) (124/70 - 146/66)  BP(mean): --  RR: 15 (04 Dec 2018 07:44) (15 - 16)  SpO2: 97% (04 Dec 2018 07:44) (97% - 97%)  Daily     Daily     General: alert , gloablly aphasic    Resp: clear bilaterally no R/RW  Cardio: Nl S1 and S2 HR 89  Abdomen: +BS soft, Nt no suprapubic pain  Extremities: RUE reduced PROM end range extension, increased tone throughout Yazmin 3/5  RLE Yazmin 2, -15 degrees full extension  no calf swelling +soft, NT      RECENT LABS:                          9.3    6.4   )-----------( 224      ( 03 Dec 2018 06:50 )             27.7     12-03    137  |  102  |  9   ----------------------------<  116<H>  3.8   |  28  |  0.66    Ca    9.5      03 Dec 2018 06:50    TPro  6.1  /  Alb  3.3  /  TBili  0.8  /  DBili  0.2  /  AST  100<H>  /  ALT  140<H>  /  AlkPhos  76  12-03    LIVER FUNCTIONS - ( 03 Dec 2018 06:50 )  Alb: 3.3 g/dL / Pro: 6.1 g/dL / ALK PHOS: 76 U/L / ALT: 140 U/L DA / AST: 100 U/L / GGT: x                   CAPILLARY BLOOD GLUCOSE        IMPRESSION AND PLAN:

## 2018-12-05 LAB
ALBUMIN SERPL ELPH-MCNC: 3.3 G/DL — SIGNIFICANT CHANGE UP (ref 3.3–5)
ALP SERPL-CCNC: 75 U/L — SIGNIFICANT CHANGE UP (ref 40–120)
ALT FLD-CCNC: 119 U/L DA — HIGH (ref 10–45)
AST SERPL-CCNC: 60 U/L — HIGH (ref 10–40)
BILIRUB DIRECT SERPL-MCNC: 0.3 MG/DL — HIGH (ref 0–0.2)
BILIRUB INDIRECT FLD-MCNC: 0.7 MG/DL — SIGNIFICANT CHANGE UP (ref 0.2–1)
BILIRUB SERPL-MCNC: 1 MG/DL — SIGNIFICANT CHANGE UP (ref 0.2–1.2)
PROT SERPL-MCNC: 6.1 G/DL — SIGNIFICANT CHANGE UP (ref 6–8.3)

## 2018-12-05 PROCEDURE — 99233 SBSQ HOSP IP/OBS HIGH 50: CPT

## 2018-12-05 PROCEDURE — 99232 SBSQ HOSP IP/OBS MODERATE 35: CPT

## 2018-12-05 RX ADMIN — Medication 500000 UNIT(S): at 12:51

## 2018-12-05 RX ADMIN — Medication 250 MILLIGRAM(S): at 06:11

## 2018-12-05 RX ADMIN — Medication 5 MILLIGRAM(S): at 06:10

## 2018-12-05 RX ADMIN — SENNA PLUS 2 TABLET(S): 8.6 TABLET ORAL at 22:29

## 2018-12-05 RX ADMIN — Medication 100 MILLIGRAM(S): at 06:10

## 2018-12-05 RX ADMIN — Medication 1 TABLET(S): at 12:21

## 2018-12-05 RX ADMIN — Medication 20 MILLIGRAM(S): at 12:21

## 2018-12-05 RX ADMIN — Medication 81 MILLIGRAM(S): at 12:21

## 2018-12-05 RX ADMIN — Medication 5 MILLIGRAM(S): at 22:28

## 2018-12-05 RX ADMIN — ENOXAPARIN SODIUM 40 MILLIGRAM(S): 100 INJECTION SUBCUTANEOUS at 12:21

## 2018-12-05 RX ADMIN — LOSARTAN POTASSIUM 100 MILLIGRAM(S): 100 TABLET, FILM COATED ORAL at 06:11

## 2018-12-05 RX ADMIN — Medication 250 MILLIGRAM(S): at 17:17

## 2018-12-05 RX ADMIN — Medication 100 MILLIGRAM(S): at 15:00

## 2018-12-05 RX ADMIN — Medication 500000 UNIT(S): at 22:29

## 2018-12-05 RX ADMIN — Medication 100 MILLIGRAM(S): at 22:28

## 2018-12-05 RX ADMIN — Medication 5 MILLIGRAM(S): at 17:17

## 2018-12-05 RX ADMIN — Medication 500000 UNIT(S): at 06:11

## 2018-12-05 RX ADMIN — POLYETHYLENE GLYCOL 3350 17 GRAM(S): 17 POWDER, FOR SOLUTION ORAL at 12:21

## 2018-12-05 NOTE — PROGRESS NOTE ADULT - SUBJECTIVE AND OBJECTIVE BOX
Patient is a 86y old  Female who presents with a chief complaint of CVA right HP , aphasia (04 Dec 2018 12:20)      HPI:  86 year old woman RH dominant with multiple vascular risk factors, including age and HTN was transferred from [facility] to [facility] for acute onset of right-sided weakness. She was noted to have developed acute onset of right-sided weakness and language disturbance. She was initially evaluated at [facility] through tele-stroke and was treated with IV tPA (Admission NIHSS 22). MRI brain showed left CYNDEE and MCA distribution infarct with mild hemorrhagic transformation (HI 1). 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    During hospital stay was neurologically without acute change: remains with global aphasia, right hemiplegia . Permissive hypertension was slowly titrated to normotension. Course was complicated by urinary retention which was managed with PRN straight catheterization. MBS was performed and patient was placed on a Dysphagia 1 honey diet which has been well-tolerated. Lipitor discontinued due to admission LDL of 104. Family meeting to discuss goals of care done on 11/8 2pm, family decided code status DNR/DNI. Discussed and completed MOLST form. Briefly,  pt is DNR/DNI,  TRIAL OF NG TUBE IF BECOMES DYSPHASIC, IV FLUIDS AS NEEDED,  ANTIBIOTICS FOR INFECTION, RETURN TO HOSPITAL, FULL MEDICAL MANAGEMENT SHORT OF INTUBATION AND RESUSCITATION. Cardiac monitoring revealed no events. TTE shows EF 74% no evidence of thrombus or PFO.  Deemed medically stable for acute rehab. (15 Nov 2018 16:23)      PAST MEDICAL & SURGICAL HISTORY:  HTN (hypertension)  Hypertension  S/P appendectomy      MEDICATIONS  (STANDING):  aspirin  chewable 81 milliGRAM(s) Oral daily  baclofen 5 milliGRAM(s) Oral two times a day  bisacodyl 5 milliGRAM(s) Oral at bedtime  docusate sodium 100 milliGRAM(s) Oral three times a day  enoxaparin Injectable 40 milliGRAM(s) SubCutaneous daily  FLUoxetine 20 milliGRAM(s) Oral daily  losartan 100 milliGRAM(s) Oral daily  multivitamin 1 Tablet(s) Oral daily  nystatin    Suspension 425853 Unit(s) Oral three times a day  polyethylene glycol 3350 17 Gram(s) Oral daily  saccharomyces boulardii 250 milliGRAM(s) Oral two times a day  senna 2 Tablet(s) Oral at bedtime    MEDICATIONS  (PRN):  bisacodyl Suppository 10 milliGRAM(s) Rectal daily PRN Constipation  naproxen 250 milliGRAM(s) Oral two times a day PRN Moderate Pain (4 - 6)  phenol 1.4% (CHLORASEPTIC) Oral Spray 1 Spray(s) Topical every 4 hours PRN sore throat pain      Allergies    No Known Allergies    Intolerances        TODAY'S SUBJECTIVE & REVIEW OF SYMPTOMS:    Patient sitting in chair, requires assist for feeding due to apraxia. continued inconsistencies on yes/no responses to simple personal questions, perseverates on "no"  Does not appear to guard right flank, lucho repeatedly dsicomfort on palpation    PHYSICAL EXAM  86y  Vital Signs Last 24 Hrs  T(C): 36.8 (05 Dec 2018 07:45), Max: 36.8 (05 Dec 2018 07:45)  T(F): 98.3 (05 Dec 2018 07:45), Max: 98.3 (05 Dec 2018 07:45)  HR: 73 (05 Dec 2018 07:45) (73 - 88)  BP: 136/73 (05 Dec 2018 07:45) (130/76 - 136/73)  BP(mean): --  RR: 14 (05 Dec 2018 07:45) (14 - 18)  SpO2: 97% (05 Dec 2018 07:45) (97% - 98%)  Daily     Daily     General: alert eyes open spontatneously    Resp: clear bilaterally no R?r/W good effort, no reduced sounds bases  Cardio: Nl S2 and S2 HR 73  right flank/chest wall with ecchymoses, no hematoma palpated, no induation, no guarding or splinting  Abdomen: soft ND NT +BS  Extremities: RUE > LE flexor tone, improved PROm RLE   no calf swelling or pedal edema +soft, NT        RECENT LABS:  EXAM:  RIBS RIGHT W CHEST (3 VIEWS)      PROCEDURE DATE:  12/04/2018        INTERPRETATION:  Ribs with chest   Comparison: 11/02/18  Views:4  History pain    There is no acute rib fracture. There is no pneumothorax, consolidation   or effusion. The lungs are clear. The heart and mediastinum are normal.    Impression:  Normal                  DONTAE BECERRIL M.D., ATTENDING RADIOLOGIST  This document has been electronically signed. Dec  4 2018  9:01PM        TPro  6.1  /  Alb  3.3  /  TBili  1.0  /  DBili  0.3<H>  /  AST  60<H>  /  ALT  119<H>  /  AlkPhos  75  12-05    LIVER FUNCTIONS - ( 05 Dec 2018 06:30 )  Alb: 3.3 g/dL / Pro: 6.1 g/dL / ALK PHOS: 75 U/L / ALT: 119 U/L DA / AST: 60 U/L / GGT: x                   CAPILLARY BLOOD GLUCOSE        IMPRESSION AND PLAN:

## 2018-12-05 NOTE — PROGRESS NOTE ADULT - ASSESSMENT
HPI    Pt is a 87 yo F admitted to acute rehab for left ICA CVA, improving right le weakness per son     *Left ICA ischemic CVA   cad risk management   Cont PT/OT/SLP   CAD risk management   Fluoxetine for motor improvement   fall and aspiration risk precautions     right sided ecchymosis most likely from mild trauma in shower to that area   no fall pre nursing staff and nursing aid   rib series negative   continue the current VTE proph     LFT abn   no acute liver failure   review with neuro to see if statin can be dc.   trend out lft over time     *Essential HTN  Controlled  Cont Losartan   low sodium diet     *dysphagia with aphasia  Pureed w honey thickened liquid  SLP  aspiration precautions     *DVT ppx   Lovenox     high risk for morbidity, mortality secondary to the above mentioned medical conditions   vte proph   reviewed with patient and daughter plan of care   reviewed with the rehab team plan of care

## 2018-12-05 NOTE — PROGRESS NOTE ADULT - SUBJECTIVE AND OBJECTIVE BOX
HPI    Pt is a 87 yo F admitted to acute rehab for left ICA CVA  Pt was seen in the acute gym rehab setting with daughter at bedside     feels better no co    no chest pain no sob     ros all others are negative     Vital Signs Last 24 Hrs  T(C): 36.8 (05 Dec 2018 07:45), Max: 36.8 (05 Dec 2018 07:45)  T(F): 98.3 (05 Dec 2018 07:45), Max: 98.3 (05 Dec 2018 07:45)  HR: 73 (05 Dec 2018 07:45) (73 - 88)  BP: 136/73 (05 Dec 2018 07:45) (130/76 - 136/73)  BP(mean): --  RR: 14 (05 Dec 2018 07:45) (14 - 18)  SpO2: 97% (05 Dec 2018 07:45) (97% - 98%)      MEDICATIONS  (STANDING):  aspirin  chewable 81 milliGRAM(s) Oral daily  atorvastatin 40 milliGRAM(s) Oral at bedtime  baclofen 5 milliGRAM(s) Oral two times a day  bisacodyl 5 milliGRAM(s) Oral at bedtime  docusate sodium 100 milliGRAM(s) Oral three times a day  enoxaparin Injectable 40 milliGRAM(s) SubCutaneous daily  FLUoxetine 20 milliGRAM(s) Oral daily  losartan 100 milliGRAM(s) Oral daily  multivitamin 1 Tablet(s) Oral daily  nystatin    Suspension 414183 Unit(s) Oral three times a day  polyethylene glycol 3350 17 Gram(s) Oral daily  saccharomyces boulardii 250 milliGRAM(s) Oral two times a day  senna 2 Tablet(s) Oral at bedtime    MEDICATIONS  (PRN):  bisacodyl Suppository 10 milliGRAM(s) Rectal daily PRN Constipation  naproxen 250 milliGRAM(s) Oral two times a day PRN Moderate Pain (4 - 6)  phenol 1.4% (CHLORASEPTIC) Oral Spray 1 Spray(s) Topical every 4 hours PRN sore throat pain          TPro  6.1  /  Alb  3.3  /  TBili  1.0  /  DBili  0.3<H>  /  AST  60<H>  /  ALT  119<H>  /  AlkPhos  75  12-05

## 2018-12-05 NOTE — PROGRESS NOTE ADULT - ASSESSMENT
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 Was restarted which may be source of elevated LFTs. Discussed with neuro 12?4 who is agreeable. LFTs today stable/improving./ continue to monitor  -continue Fluoxetine  -Continue OT, PT, SLP services  -add recreational therapies  -resting hand splint ordered for tone  - -dc tylenol for pain, add naproxen 12/1  -spasticity improved on balcofen 5 bid. monitor efficacy and LFTs    #Cardiovascular  - no PFO on ECHO, EF 74%, HTN  -Losartan.    #s/p Rx UTI, +Enterobacter on Cx  -completed Rx 11/29    #GI-  -dc tylenol with start of baclofen  -Colace and Senna changed from PRN to standing orders    -# DIET  upgarded to mechanical soft and honey thickened liquids    #ecchymoses rght chest wall, no known trauma, non tender  -Xray negative for rib fx. Monitor cloesly      #Code Status: As per meeting with Palliative Team at [facility]  pt is DNR/DNI,  TRIAL OF NG TUBE IF BECOMES DYSPHASIC, IV FLUIDS AS NEEDED,  ANTIBIOTICS FOR INFECTION, RETURN TO HOSPITAL, FULL MEDICAL MANAGEMENT SHORT OF INTUBATION AND RESUSCITATION    # Case discussed in IDT rounds 12/4  Caregiver training in progress. patient requires 1-2 person assist for transfers , is non ambulatory, and currently working on medical follow up, equipment needs, home modifications and caregiver training in preparation for dc home. Will extend dc date to address these issues from 12/7/18 to 12/12/18      LABS:      neuro f/u re: dc statin (previous hospital)

## 2018-12-05 NOTE — CHART NOTE - NSCHARTNOTEFT_GEN_A_CORE
Nutrition Follow Up   Hospital Course (Per Electronic Medical Record):   Source: Patient [ ]    Family [ ]     Medical Record/Nursing Staff [X]     Diet: Low Sodium Mechanical Soft (Dysphagia 2) Diet w/ Honey Thick Liquids   Tolerates Diet Well - Per Staff  Consumes % of Meals (as Per Documentation) Usually, Varies @Times    Enteral/Parenteral Nutrition: N/A    Current Weight: 136.6lb on 11/29  % Weight Change: N/A  Obtain New Weight to Confirm Change/Accuracy   Obtain Weights Weekly     Pertinent Medications: MEDICATIONS  (STANDING):  aspirin  chewable 81 milliGRAM(s) Oral daily  baclofen 5 milliGRAM(s) Oral two times a day  bisacodyl 5 milliGRAM(s) Oral at bedtime  docusate sodium 100 milliGRAM(s) Oral three times a day  enoxaparin Injectable 40 milliGRAM(s) SubCutaneous daily  FLUoxetine 20 milliGRAM(s) Oral daily  losartan 100 milliGRAM(s) Oral daily  multivitamin 1 Tablet(s) Oral daily  nystatin    Suspension 798950 Unit(s) Oral three times a day  polyethylene glycol 3350 17 Gram(s) Oral daily  saccharomyces boulardii 250 milliGRAM(s) Oral two times a day  senna 2 Tablet(s) Oral at bedtime    MEDICATIONS  (PRN):  bisacodyl Suppository 10 milliGRAM(s) Rectal daily PRN Constipation  naproxen 250 milliGRAM(s) Oral two times a day PRN Moderate Pain (4 - 6)  phenol 1.4% (CHLORASEPTIC) Oral Spray 1 Spray(s) Topical every 4 hours PRN sore throat pain    Pertinent Labs:  12-03 Na137 mmol/L Glu 116 mg/dL<H> K+ 3.8 mmol/L Cr  0.66 mg/dL BUN 9 mg/dL 12-05 Alb 3.3 g/dL 11-16 PAB 18 mg/dL<L>    Skin: No Pressure Ulcers     Edema: None Noted     Last BM: 12/4    Estimated Needs:   [X] No Change since Previous Assessment  [ ] Recalculated:     Previous Nutrition Diagnosis:   Chewing/Swallowing Difficulties     Nutrition Diagnosis is [X] Ongoing  [ ] Resolved [ ] Not Applicable   Continues on Mechanical Soft (Dysphagia 2) Diet w/ Honey Thick Liquids      New Nutrition Diagnosis: [X] Not Applicable    Interventions:   1. Recommend Continue Nutrition Plan of Care     Monitoring & Evaluation:   [X] PO Intake   [X] Tolerance to Diet Prescription   [X] Weights   [X] Follow Up (Per Protocol)  [X] Other: Labs     RD Remains Available.  Parviz Hayden RD

## 2018-12-06 PROCEDURE — 99232 SBSQ HOSP IP/OBS MODERATE 35: CPT

## 2018-12-06 PROCEDURE — 99233 SBSQ HOSP IP/OBS HIGH 50: CPT

## 2018-12-06 RX ADMIN — Medication 250 MILLIGRAM(S): at 06:19

## 2018-12-06 RX ADMIN — ENOXAPARIN SODIUM 40 MILLIGRAM(S): 100 INJECTION SUBCUTANEOUS at 13:10

## 2018-12-06 RX ADMIN — Medication 100 MILLIGRAM(S): at 14:33

## 2018-12-06 RX ADMIN — Medication 5 MILLIGRAM(S): at 21:32

## 2018-12-06 RX ADMIN — Medication 1 TABLET(S): at 13:10

## 2018-12-06 RX ADMIN — Medication 100 MILLIGRAM(S): at 21:32

## 2018-12-06 RX ADMIN — POLYETHYLENE GLYCOL 3350 17 GRAM(S): 17 POWDER, FOR SOLUTION ORAL at 13:10

## 2018-12-06 RX ADMIN — Medication 250 MILLIGRAM(S): at 20:23

## 2018-12-06 RX ADMIN — Medication 100 MILLIGRAM(S): at 06:19

## 2018-12-06 RX ADMIN — Medication 20 MILLIGRAM(S): at 13:10

## 2018-12-06 RX ADMIN — Medication 81 MILLIGRAM(S): at 13:10

## 2018-12-06 RX ADMIN — Medication 500000 UNIT(S): at 06:19

## 2018-12-06 RX ADMIN — SENNA PLUS 2 TABLET(S): 8.6 TABLET ORAL at 21:32

## 2018-12-06 RX ADMIN — Medication 5 MILLIGRAM(S): at 06:19

## 2018-12-06 RX ADMIN — Medication 5 MILLIGRAM(S): at 20:23

## 2018-12-06 RX ADMIN — Medication 500000 UNIT(S): at 14:32

## 2018-12-06 RX ADMIN — Medication 500000 UNIT(S): at 21:33

## 2018-12-06 RX ADMIN — LOSARTAN POTASSIUM 100 MILLIGRAM(S): 100 TABLET, FILM COATED ORAL at 06:19

## 2018-12-06 RX ADMIN — Medication 250 MILLIGRAM(S): at 13:10

## 2018-12-06 NOTE — PROGRESS NOTE ADULT - ASSESSMENT
HPI    Pt is a 87 yo F admitted to acute rehab for left ICA CVA, improving right le weakness per son     *Left ICA ischemic CVA   cad risk management   Cont PT/OT/SLP   CAD risk management   Fluoxetine for motor improvement   fall and aspiration risk precautions     right sided ecchymosis most likely from mild trauma in shower to that area   no fall pre nursing staff and nursing aid   rib series negative   maintain fall risk precautions   continue the current VTE proph     LFT abn   no acute liver failure   review with neuro to see if statin can be dc.   trend out lft over time     *Essential HTN  Controlled  Cont Losartan   low sodium diet   low sodium diet     *dysphagia with aphasia  Pureed w honey thickened liquid  SLP  aspiration precautions     *DVT ppx   Lovenox     high risk for morbidity, mortality secondary to the above mentioned medical conditions   vte proph   reviewed with the rehab team plan of care

## 2018-12-06 NOTE — PROGRESS NOTE ADULT - ASSESSMENT
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 Was restarted later, which may be source of elevated LFTs. Discussed with neuro 12?4 who is agreeable with stopping statin. LFTs ordered for AM 12/7  -continue Fluoxetine  -Continue OT, PT, SLP services  -add recreational therapies  -resting hand splint ordered for tone  - -dc tylenol for pain, add naproxen 12/1  -spasticity improved on balcofen 5 bid. monitor efficacy and LFTs. If stable, increase back to TID as tone has worsened in last 2 days    #Cardiovascular  - no PFO on ECHO, EF 74%, HTN  -Losartan.    #s/p Rx UTI, +Enterobacter on Cx  -completed Rx 11/29    #GI-  -dc tylenol with start of baclofen  -Colace and Senna changed from PRN to standing orders    -# DIET  upgarded to mechanical soft and honey thickened liquids    #ecchymoses rght chest wall, no known trauma, non tender  -Xray negative for rib fx. continue to monitor      #Code Status: As per meeting with Palliative Team at [facility]  pt is DNR/DNI,  TRIAL OF NG TUBE IF BECOMES DYSPHASIC, IV FLUIDS AS NEEDED,  ANTIBIOTICS FOR INFECTION, RETURN TO HOSPITAL, FULL MEDICAL MANAGEMENT SHORT OF INTUBATION AND RESUSCITATION    # Case discussed in IDT rounds 12/4  Caregiver training in progress. patient requires 1-2 person assist for transfers , is non ambulatory, and currently working on medical follow up, equipment needs, home modifications and caregiver training in preparation for dc home. Will extend dc date to address these issues from 12/7/18 to 12/12/18      LABS:  LFTs 12/7

## 2018-12-06 NOTE — PROGRESS NOTE ADULT - SUBJECTIVE AND OBJECTIVE BOX
Patient is a 86y old  Female who presents with a chief complaint of CVA right HP , aphasia fu (06 Dec 2018 11:47)      HPI:  86 year old woman RH dominant with multiple vascular risk factors, including age and HTN was transferred from [facility] to [facility] for acute onset of right-sided weakness. She was noted to have developed acute onset of right-sided weakness and language disturbance. She was initially evaluated at [facility] through tele-stroke and was treated with IV tPA (Admission NIHSS 22). MRI brain showed left CYNDEE and MCA distribution infarct with mild hemorrhagic transformation (HI 1). 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    During hospital stay was neurologically without acute change: remains with global aphasia, right hemiplegia . Permissive hypertension was slowly titrated to normotension. Course was complicated by urinary retention which was managed with PRN straight catheterization. MBS was performed and patient was placed on a Dysphagia 1 honey diet which has been well-tolerated. Lipitor discontinued due to admission LDL of 104. Family meeting to discuss goals of care done on 11/8 2pm, family decided code status DNR/DNI. Discussed and completed MOLST form. Briefly,  pt is DNR/DNI,  TRIAL OF NG TUBE IF BECOMES DYSPHASIC, IV FLUIDS AS NEEDED,  ANTIBIOTICS FOR INFECTION, RETURN TO HOSPITAL, FULL MEDICAL MANAGEMENT SHORT OF INTUBATION AND RESUSCITATION. Cardiac monitoring revealed no events. TTE shows EF 74% no evidence of thrombus or PFO.  Deemed medically stable for acute rehab. (15 Nov 2018 16:23)      PAST MEDICAL & SURGICAL HISTORY:  HTN (hypertension)  Hypertension  S/P appendectomy      MEDICATIONS  (STANDING):  aspirin  chewable 81 milliGRAM(s) Oral daily  baclofen 5 milliGRAM(s) Oral two times a day  bisacodyl 5 milliGRAM(s) Oral at bedtime  docusate sodium 100 milliGRAM(s) Oral three times a day  enoxaparin Injectable 40 milliGRAM(s) SubCutaneous daily  FLUoxetine 20 milliGRAM(s) Oral daily  losartan 100 milliGRAM(s) Oral daily  multivitamin 1 Tablet(s) Oral daily  nystatin    Suspension 697470 Unit(s) Oral three times a day  polyethylene glycol 3350 17 Gram(s) Oral daily  saccharomyces boulardii 250 milliGRAM(s) Oral two times a day  senna 2 Tablet(s) Oral at bedtime    MEDICATIONS  (PRN):  bisacodyl Suppository 10 milliGRAM(s) Rectal daily PRN Constipation  naproxen 250 milliGRAM(s) Oral two times a day PRN Moderate Pain (4 - 6)  phenol 1.4% (CHLORASEPTIC) Oral Spray 1 Spray(s) Topical every 4 hours PRN sore throat pain      Allergies    No Known Allergies    Intolerances        TODAY'S SUBJECTIVE & REVIEW OF SYMPTOMS:    Patient sitting in chair. continues to be severely apraxic in movement eeven LUE and speech which affects reliability of yes/no responses even through gestures. No acute events overnight, seen with assistance language line  in [place] #[id number]    PHYSICAL EXAM  86y  Vital Signs Last 24 Hrs  T(C): 36.8 (06 Dec 2018 08:24), Max: 36.9 (05 Dec 2018 22:25)  T(F): 98.2 (06 Dec 2018 08:24), Max: 98.4 (05 Dec 2018 22:25)  HR: 90 (06 Dec 2018 08:24) (71 - 90)  BP: 172/67 (06 Dec 2018 08:24) (144/70 - 172/67)  BP(mean): --  RR: 16 (06 Dec 2018 08:24) (14 - 16)  SpO2: 99% (06 Dec 2018 08:24) (96% - 99%)  Daily     Daily     General: alert, eyes open perseverating on "no" globally aphasic    Resp: clear bilaterally no R/R/W  chest wall: ecchymoses slightly fading, no spreadm, no induration, no hematoma  no TTP   Cardio :  Nl S1 and S2 regular  Abdomen:  +BS soft, NT ND  Extremities: right knee -35 degrees full extension right UE shoudler and elbow significant flexor tone        RECENT LABS:          TPro  6.1  /  Alb  3.3  /  TBili  1.0  /  DBili  0.3<H>  /  AST  60<H>  /  ALT  119<H>  /  AlkPhos  75  12-05    LIVER FUNCTIONS - ( 05 Dec 2018 06:30 )  Alb: 3.3 g/dL / Pro: 6.1 g/dL / ALK PHOS: 75 U/L / ALT: 119 U/L DA / AST: 60 U/L / GGT: x                   CAPILLARY BLOOD GLUCOSE        IMPRESSION AND PLAN:

## 2018-12-06 NOTE — PROGRESS NOTE ADULT - SUBJECTIVE AND OBJECTIVE BOX
HPI    Pt is a 87 yo F admitted to acute rehab for left ICA CVA  Pt was seen in the acute rehab setting, in wheelchair     feels better no co    seems a little frustrated this am     no chest pain no sob     ros all others are negative     Vital Signs Last 24 Hrs  T(C): 36.8 (06 Dec 2018 08:24), Max: 36.9 (05 Dec 2018 22:25)  T(F): 98.2 (06 Dec 2018 08:24), Max: 98.4 (05 Dec 2018 22:25)  HR: 90 (06 Dec 2018 08:24) (71 - 90)  BP: 172/67 (06 Dec 2018 08:24) (144/70 - 172/67)  BP(mean): --  RR: 16 (06 Dec 2018 08:24) (14 - 16)  SpO2: 99% (06 Dec 2018 08:24) (96% - 99%)    MEDICATIONS  (STANDING):  aspirin  chewable 81 milliGRAM(s) Oral daily  atorvastatin 40 milliGRAM(s) Oral at bedtime  baclofen 5 milliGRAM(s) Oral two times a day  bisacodyl 5 milliGRAM(s) Oral at bedtime  docusate sodium 100 milliGRAM(s) Oral three times a day  enoxaparin Injectable 40 milliGRAM(s) SubCutaneous daily  FLUoxetine 20 milliGRAM(s) Oral daily  losartan 100 milliGRAM(s) Oral daily  multivitamin 1 Tablet(s) Oral daily  nystatin    Suspension 953465 Unit(s) Oral three times a day  polyethylene glycol 3350 17 Gram(s) Oral daily  saccharomyces boulardii 250 milliGRAM(s) Oral two times a day  senna 2 Tablet(s) Oral at bedtime    MEDICATIONS  (PRN):  bisacodyl Suppository 10 milliGRAM(s) Rectal daily PRN Constipation  naproxen 250 milliGRAM(s) Oral two times a day PRN Moderate Pain (4 - 6)  phenol 1.4% (CHLORASEPTIC) Oral Spray 1 Spray(s) Topical every 4 hours PRN sore throat pain          TPro  6.1  /  Alb  3.3  /  TBili  1.0  /  DBili  0.3<H>  /  AST  60<H>  /  ALT  119<H>  /  AlkPhos  75  12-05

## 2018-12-07 LAB
ALBUMIN SERPL ELPH-MCNC: 3.6 G/DL — SIGNIFICANT CHANGE UP (ref 3.3–5)
ALP SERPL-CCNC: 78 U/L — SIGNIFICANT CHANGE UP (ref 40–120)
ALT FLD-CCNC: 96 U/L DA — HIGH (ref 10–45)
AST SERPL-CCNC: 51 U/L — HIGH (ref 10–40)
BILIRUB DIRECT SERPL-MCNC: 0.2 MG/DL — SIGNIFICANT CHANGE UP (ref 0–0.2)
BILIRUB INDIRECT FLD-MCNC: 0.8 MG/DL — SIGNIFICANT CHANGE UP (ref 0.2–1)
BILIRUB SERPL-MCNC: 1 MG/DL — SIGNIFICANT CHANGE UP (ref 0.2–1.2)
PROT SERPL-MCNC: 6.6 G/DL — SIGNIFICANT CHANGE UP (ref 6–8.3)

## 2018-12-07 PROCEDURE — 99232 SBSQ HOSP IP/OBS MODERATE 35: CPT

## 2018-12-07 RX ORDER — BACLOFEN 100 %
5 POWDER (GRAM) MISCELLANEOUS THREE TIMES A DAY
Qty: 0 | Refills: 0 | Status: DISCONTINUED | OUTPATIENT
Start: 2018-12-07 | End: 2018-12-12

## 2018-12-07 RX ADMIN — Medication 500000 UNIT(S): at 21:48

## 2018-12-07 RX ADMIN — Medication 1 TABLET(S): at 12:20

## 2018-12-07 RX ADMIN — Medication 250 MILLIGRAM(S): at 17:20

## 2018-12-07 RX ADMIN — Medication 5 MILLIGRAM(S): at 15:08

## 2018-12-07 RX ADMIN — Medication 500000 UNIT(S): at 15:08

## 2018-12-07 RX ADMIN — Medication 100 MILLIGRAM(S): at 06:46

## 2018-12-07 RX ADMIN — Medication 5 MILLIGRAM(S): at 21:48

## 2018-12-07 RX ADMIN — Medication 500000 UNIT(S): at 06:45

## 2018-12-07 RX ADMIN — Medication 250 MILLIGRAM(S): at 06:46

## 2018-12-07 RX ADMIN — POLYETHYLENE GLYCOL 3350 17 GRAM(S): 17 POWDER, FOR SOLUTION ORAL at 12:20

## 2018-12-07 RX ADMIN — ENOXAPARIN SODIUM 40 MILLIGRAM(S): 100 INJECTION SUBCUTANEOUS at 12:20

## 2018-12-07 RX ADMIN — Medication 5 MILLIGRAM(S): at 06:45

## 2018-12-07 RX ADMIN — Medication 20 MILLIGRAM(S): at 12:20

## 2018-12-07 RX ADMIN — Medication 81 MILLIGRAM(S): at 12:20

## 2018-12-07 RX ADMIN — Medication 100 MILLIGRAM(S): at 15:08

## 2018-12-07 RX ADMIN — LOSARTAN POTASSIUM 100 MILLIGRAM(S): 100 TABLET, FILM COATED ORAL at 06:45

## 2018-12-07 NOTE — PROGRESS NOTE ADULT - SUBJECTIVE AND OBJECTIVE BOX
HPI    Pt is a 87 yo F admitted to acute rehab for left ICA CVA  Pt was seen in the acute rehab setting, in wheelchair with daughter present in room     feels better no co    no chest pain no sob     ros all others are negative     Vital Signs Last 24 Hrs  T(C): 36.7 (07 Dec 2018 07:30), Max: 36.7 (06 Dec 2018 20:20)  T(F): 98.1 (07 Dec 2018 07:30), Max: 98.1 (06 Dec 2018 20:20)  HR: 98 (07 Dec 2018 07:30) (80 - 98)  BP: 159/71 (07 Dec 2018 07:30) (145/65 - 159/71)  BP(mean): --  RR: 14 (07 Dec 2018 07:30) (14 - 14)  SpO2: 98% (07 Dec 2018 07:30) (96% - 98%)    MEDICATIONS  (STANDING):  aspirin  chewable 81 milliGRAM(s) Oral daily  atorvastatin 40 milliGRAM(s) Oral at bedtime  baclofen 5 milliGRAM(s) Oral two times a day  bisacodyl 5 milliGRAM(s) Oral at bedtime  docusate sodium 100 milliGRAM(s) Oral three times a day  enoxaparin Injectable 40 milliGRAM(s) SubCutaneous daily  FLUoxetine 20 milliGRAM(s) Oral daily  losartan 100 milliGRAM(s) Oral daily  multivitamin 1 Tablet(s) Oral daily  nystatin    Suspension 220541 Unit(s) Oral three times a day  polyethylene glycol 3350 17 Gram(s) Oral daily  saccharomyces boulardii 250 milliGRAM(s) Oral two times a day  senna 2 Tablet(s) Oral at bedtime    MEDICATIONS  (PRN):  bisacodyl Suppository 10 milliGRAM(s) Rectal daily PRN Constipation  naproxen 250 milliGRAM(s) Oral two times a day PRN Moderate Pain (4 - 6)  phenol 1.4% (CHLORASEPTIC) Oral Spray 1 Spray(s) Topical every 4 hours PRN sore throat pain          TPro  6.1  /  Alb  3.3  /  TBili  1.0  /  DBili  0.3<H>  /  AST  60<H>  /  ALT  119<H>  /  AlkPhos  75  12-05

## 2018-12-07 NOTE — PROGRESS NOTE ADULT - NSHPATTENDINGPLANDISCUSS_GEN_ALL_CORE
Alli Steele MD; family
Alli Steele MD; patient
Renetta Lai NP; Angelita Ayala DO; SLp; patient
Renetta Lai, NP; SLP; family
Alli Steele MD

## 2018-12-07 NOTE — PROGRESS NOTE ADULT - ASSESSMENT
HPI    Pt is a 87 yo F admitted to acute rehab for left ICA CVA, improving right le weakness per son     *Left ICA ischemic CVA   cad risk management   Cont PT/OT/SLP   CAD risk management   Fluoxetine for motor improvement   fall and aspiration risk precautions     right sided ecchymosis most likely from mild trauma in shower to that area   no fall pre nursing staff and nursing aid   rib series negative   maintain fall risk precautions   continue the current VTE proph     LFT abn   no acute liver failure   review with neuro to see if statin can be dc.   trend out lft over time     *Essential HTN  Controlled  Cont Losartan   low sodium diet   low sodium diet     *dysphagia with aphasia  Pureed w honey thickened liquid  SLP  aspiration precautions     *DVT ppx   Lovenox     high risk for morbidity, mortality secondary to the above mentioned medical conditions   vte proph   reviewed with the patient plan of care

## 2018-12-07 NOTE — PROGRESS NOTE ADULT - SUBJECTIVE AND OBJECTIVE BOX
Patient is a 86y old  Female who presents with a chief complaint of CVA right HP , aphasia (07 Dec 2018 13:27)      HPI:  86 year old woman RH dominant with multiple vascular risk factors, including age and HTN was transferred from [facility] to [facility] for acute onset of right-sided weakness. She was noted to have developed acute onset of right-sided weakness and language disturbance. She was initially evaluated at [facility] through tele-stroke and was treated with IV tPA (Admission NIHSS 22). MRI brain showed left CYNDEE and MCA distribution infarct with mild hemorrhagic transformation (HI 1). 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    During hospital stay was neurologically without acute change: remains with global aphasia, right hemiplegia . Permissive hypertension was slowly titrated to normotension. Course was complicated by urinary retention which was managed with PRN straight catheterization. MBS was performed and patient was placed on a Dysphagia 1 honey diet which has been well-tolerated. Lipitor discontinued due to admission LDL of 104. Family meeting to discuss goals of care done on 11/8 2pm, family decided code status DNR/DNI. Discussed and completed MOLST form. Briefly,  pt is DNR/DNI,  TRIAL OF NG TUBE IF BECOMES DYSPHASIC, IV FLUIDS AS NEEDED,  ANTIBIOTICS FOR INFECTION, RETURN TO HOSPITAL, FULL MEDICAL MANAGEMENT SHORT OF INTUBATION AND RESUSCITATION. Cardiac monitoring revealed no events. TTE shows EF 74% no evidence of thrombus or PFO.  Deemed medically stable for acute rehab. (15 Nov 2018 16:23)      PAST MEDICAL & SURGICAL HISTORY:  HTN (hypertension)  Hypertension  S/P appendectomy      MEDICATIONS  (STANDING):  aspirin  chewable 81 milliGRAM(s) Oral daily  baclofen 5 milliGRAM(s) Oral two times a day  bisacodyl 5 milliGRAM(s) Oral at bedtime  docusate sodium 100 milliGRAM(s) Oral three times a day  enoxaparin Injectable 40 milliGRAM(s) SubCutaneous daily  FLUoxetine 20 milliGRAM(s) Oral daily  losartan 100 milliGRAM(s) Oral daily  multivitamin 1 Tablet(s) Oral daily  nystatin    Suspension 592983 Unit(s) Oral three times a day  polyethylene glycol 3350 17 Gram(s) Oral daily  saccharomyces boulardii 250 milliGRAM(s) Oral two times a day  senna 2 Tablet(s) Oral at bedtime    MEDICATIONS  (PRN):  bisacodyl Suppository 10 milliGRAM(s) Rectal daily PRN Constipation  naproxen 250 milliGRAM(s) Oral two times a day PRN Moderate Pain (4 - 6)  phenol 1.4% (CHLORASEPTIC) Oral Spray 1 Spray(s) Topical every 4 hours PRN sore throat pain      Allergies    No Known Allergies    Intolerances        TODAY'S SUBJECTIVE & REVIEW OF SYMPTOMS:  Patient was seen and examined today. There were no acute events overnight. Patient mostly answering no with inconsistent hand signals for yes or no. She appears to not be in any pain, less pain with passive knee extension. Vital signs are stable. LFTs improving.  Area of ecchymosis on right side continues to improve    PHYSICAL EXAM  86y  Vital Signs Last 24 Hrs  T(C): 36.7 (07 Dec 2018 07:30), Max: 36.7 (06 Dec 2018 20:20)  T(F): 98.1 (07 Dec 2018 07:30), Max: 98.1 (06 Dec 2018 20:20)  HR: 98 (07 Dec 2018 07:30) (80 - 98)  BP: 159/71 (07 Dec 2018 07:30) (145/65 - 159/71)  BP(mean): --  RR: 14 (07 Dec 2018 07:30) (14 - 14)  SpO2: 98% (07 Dec 2018 07:30) (96% - 98%)  Daily     Daily     General: alert, eyes open perseverating on "no" globally aphasic    Resp: clear bilaterally no R/R/W  chest wall: ecchymoses slightly fading, no spread, no induration, no hematoma  no TTP   Cardio :  Nl S1 and S2 regular  Abdomen:  +BS soft, NT ND  Extremities: MAS 3 RLE flexor tone      RECENT LABS:          TPro  6.6  /  Alb  3.6  /  TBili  1.0  /  DBili  0.2  /  AST  51<H>  /  ALT  96<H>  /  AlkPhos  78  12-07    LIVER FUNCTIONS - ( 07 Dec 2018 06:55 )  Alb: 3.6 g/dL / Pro: 6.6 g/dL / ALK PHOS: 78 U/L / ALT: 96 U/L DA / AST: 51 U/L / GGT: x Patient is a 86y old  Female who presents with a chief complaint of CVA right HP , aphasia (07 Dec 2018 13:27)      HPI:  86 year old woman RH dominant with multiple vascular risk factors, including age and HTN was transferred from [facility] to [facility] for acute onset of right-sided weakness. She was noted to have developed acute onset of right-sided weakness and language disturbance. She was initially evaluated at [facility] through tele-stroke and was treated with IV tPA (Admission NIHSS 22). MRI brain showed left CYNDEE and MCA distribution infarct with mild hemorrhagic transformation (HI 1). 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    During hospital stay was neurologically without acute change: remains with global aphasia, right hemiplegia . Permissive hypertension was slowly titrated to normotension. Course was complicated by urinary retention which was managed with PRN straight catheterization. MBS was performed and patient was placed on a Dysphagia 1 honey diet which has been well-tolerated. Lipitor discontinued due to admission LDL of 104. Family meeting to discuss goals of care done on 11/8 2pm, family decided code status DNR/DNI. Discussed and completed MOLST form. Briefly,  pt is DNR/DNI,  TRIAL OF NG TUBE IF BECOMES DYSPHASIC, IV FLUIDS AS NEEDED,  ANTIBIOTICS FOR INFECTION, RETURN TO HOSPITAL, FULL MEDICAL MANAGEMENT SHORT OF INTUBATION AND RESUSCITATION. Cardiac monitoring revealed no events. TTE shows EF 74% no evidence of thrombus or PFO.  Deemed medically stable for acute rehab. (15 Nov 2018 16:23)      PAST MEDICAL & SURGICAL HISTORY:  HTN (hypertension)  Hypertension  S/P appendectomy      MEDICATIONS  (STANDING):  aspirin  chewable 81 milliGRAM(s) Oral daily  baclofen 5 milliGRAM(s) Oral two times a day  bisacodyl 5 milliGRAM(s) Oral at bedtime  docusate sodium 100 milliGRAM(s) Oral three times a day  enoxaparin Injectable 40 milliGRAM(s) SubCutaneous daily  FLUoxetine 20 milliGRAM(s) Oral daily  losartan 100 milliGRAM(s) Oral daily  multivitamin 1 Tablet(s) Oral daily  nystatin    Suspension 832884 Unit(s) Oral three times a day  polyethylene glycol 3350 17 Gram(s) Oral daily  saccharomyces boulardii 250 milliGRAM(s) Oral two times a day  senna 2 Tablet(s) Oral at bedtime    MEDICATIONS  (PRN):  bisacodyl Suppository 10 milliGRAM(s) Rectal daily PRN Constipation  naproxen 250 milliGRAM(s) Oral two times a day PRN Moderate Pain (4 - 6)  phenol 1.4% (CHLORASEPTIC) Oral Spray 1 Spray(s) Topical every 4 hours PRN sore throat pain      Allergies    No Known Allergies    Intolerances        TODAY'S SUBJECTIVE & REVIEW OF SYMPTOMS:  [place]  Юлия ID [id number]  Patient was seen and examined today. There were no acute events overnight. Patient mostly answering no with inconsistent hand signals for yes or no. She appears to not be in any pain, less pain with passive knee extension. Vital signs are stable. LFTs improving.  Area of ecchymosis on right side continues to improve    PHYSICAL EXAM  86y  Vital Signs Last 24 Hrs  T(C): 36.7 (07 Dec 2018 07:30), Max: 36.7 (06 Dec 2018 20:20)  T(F): 98.1 (07 Dec 2018 07:30), Max: 98.1 (06 Dec 2018 20:20)  HR: 98 (07 Dec 2018 07:30) (80 - 98)  BP: 159/71 (07 Dec 2018 07:30) (145/65 - 159/71)  BP(mean): --  RR: 14 (07 Dec 2018 07:30) (14 - 14)  SpO2: 98% (07 Dec 2018 07:30) (96% - 98%)  Daily     Daily     General: alert, eyes open perseverating on "no" globally aphasic    Resp: clear bilaterally no R/R/W  chest wall: ecchymoses slightly fading, no spread, no induration, no hematoma  no TTP   Cardio :  Nl S1 and S2 regular  Abdomen:  +BS soft, NT ND  Extremities: MAS 3 RLE flexor tone      RECENT LABS:          TPro  6.6  /  Alb  3.6  /  TBili  1.0  /  DBili  0.2  /  AST  51<H>  /  ALT  96<H>  /  AlkPhos  78  12-07    LIVER FUNCTIONS - ( 07 Dec 2018 06:55 )  Alb: 3.6 g/dL / Pro: 6.6 g/dL / ALK PHOS: 78 U/L / ALT: 96 U/L DA / AST: 51 U/L / GGT: x Patient is a 86y old  Female who presents with a chief complaint of CVA right HP , aphasia (07 Dec 2018 13:27)      HPI:  86 year old woman RH dominant with multiple vascular risk factors, including age and HTN was transferred from [facility] to [facility] for acute onset of right-sided weakness. She was noted to have developed acute onset of right-sided weakness and language disturbance. She was initially evaluated at [facility] through tele-stroke and was treated with IV tPA (Admission NIHSS 22). MRI brain showed left CYNDEE and MCA distribution infarct with mild hemorrhagic transformation (HI 1). 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    During hospital stay was neurologically without acute change: remains with global aphasia, right hemiplegia . Permissive hypertension was slowly titrated to normotension. Course was complicated by urinary retention which was managed with PRN straight catheterization. MBS was performed and patient was placed on a Dysphagia 1 honey diet which has been well-tolerated. Lipitor discontinued due to admission LDL of 104. Family meeting to discuss goals of care done on 11/8 2pm, family decided code status DNR/DNI. Discussed and completed MOLST form. Briefly,  pt is DNR/DNI,  TRIAL OF NG TUBE IF BECOMES DYSPHASIC, IV FLUIDS AS NEEDED,  ANTIBIOTICS FOR INFECTION, RETURN TO HOSPITAL, FULL MEDICAL MANAGEMENT SHORT OF INTUBATION AND RESUSCITATION. Cardiac monitoring revealed no events. TTE shows EF 74% no evidence of thrombus or PFO.  Deemed medically stable for acute rehab. (15 Nov 2018 16:23)      PAST MEDICAL & SURGICAL HISTORY:  HTN (hypertension)  Hypertension  S/P appendectomy      MEDICATIONS  (STANDING):  aspirin  chewable 81 milliGRAM(s) Oral daily  baclofen 5 milliGRAM(s) Oral two times a day  bisacodyl 5 milliGRAM(s) Oral at bedtime  docusate sodium 100 milliGRAM(s) Oral three times a day  enoxaparin Injectable 40 milliGRAM(s) SubCutaneous daily  FLUoxetine 20 milliGRAM(s) Oral daily  losartan 100 milliGRAM(s) Oral daily  multivitamin 1 Tablet(s) Oral daily  nystatin    Suspension 608554 Unit(s) Oral three times a day  polyethylene glycol 3350 17 Gram(s) Oral daily  saccharomyces boulardii 250 milliGRAM(s) Oral two times a day  senna 2 Tablet(s) Oral at bedtime    MEDICATIONS  (PRN):  bisacodyl Suppository 10 milliGRAM(s) Rectal daily PRN Constipation  naproxen 250 milliGRAM(s) Oral two times a day PRN Moderate Pain (4 - 6)  phenol 1.4% (CHLORASEPTIC) Oral Spray 1 Spray(s) Topical every 4 hours PRN sore throat pain      Allergies    No Known Allergies    Intolerances        TODAY'S SUBJECTIVE & REVIEW OF SYMPTOMS:  [place]  Юлия ID [id number]  Patient was seen and examined today. There were no acute events overnight. Patient mostly answering no with inconsistent hand signals for yes or no. Significant apraxia, on top of severe global aphasia. She appears to not be in any pain, less pain with passive knee extension. Vital signs are stable. LFTs improving.  Area of ecchymosis on right side continues to improve    PHYSICAL EXAM  86y  Vital Signs Last 24 Hrs  T(C): 36.7 (07 Dec 2018 07:30), Max: 36.7 (06 Dec 2018 20:20)  T(F): 98.1 (07 Dec 2018 07:30), Max: 98.1 (06 Dec 2018 20:20)  HR: 98 (07 Dec 2018 07:30) (80 - 98)  BP: 159/71 (07 Dec 2018 07:30) (145/65 - 159/71)  BP(mean): --  RR: 14 (07 Dec 2018 07:30) (14 - 14)  SpO2: 98% (07 Dec 2018 07:30) (96% - 98%)  Daily     Daily     General: alert, eyes open perseverating on "no" globally aphasic. however, overall looks more comfortable, less distressed    Resp: clear bilaterally no R/R/W  chest wall: ecchymoses slightly fading, no spread, no induration, no hematoma  no TTP   Cardio :  Nl S1 and S2 regular  Abdomen:  +BS soft, NT ND  Extremities: MAS 2 RLE flexor tone, however improved 3 RUE  resting splint in place  no calf swelling +soift, NT      RECENT LABS:          TPro  6.6  /  Alb  3.6  /  TBili  1.0  /  DBili  0.2  /  AST  51<H>  /  ALT  96<H>  /  AlkPhos  78  12-07    LIVER FUNCTIONS - ( 07 Dec 2018 06:55 )  Alb: 3.6 g/dL / Pro: 6.6 g/dL / ALK PHOS: 78 U/L / ALT: 96 U/L DA / AST: 51 U/L / GGT: x

## 2018-12-07 NOTE — PROGRESS NOTE ADULT - ASSESSMENT
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 Was restarted later, which may be source of elevated LFTs. Discussed with neuro 12?4 who is agreeable with stopping statin. LFTs ordered for AM 12/7  -continue Fluoxetine  -Continue OT, PT, SLP services  -add recreational therapies  -resting hand splint ordered for tone  - -dc tylenol for pain, add naproxen 12/1  -spasticity improved on balcofen 5 bid. monitor efficacy and LFTs stable, increase back to TID as tone has worsened in last 2 days    #Cardiovascular  - no PFO on ECHO, EF 74%, HTN  -Losartan.    #s/p Rx UTI, +Enterobacter on Cx  -completed Rx 11/29    #GI-  -dc tylenol with start of baclofen  -Colace and Senna changed from PRN to standing orders    -# DIET  upgarded to mechanical soft and honey thickened liquids    #ecchymoses rght chest wall, no known trauma, non tender  -Xray negative for rib fx. continue to monitor      #Code Status: As per meeting with Palliative Team at [facility]  pt is DNR/DNI,  TRIAL OF NG TUBE IF BECOMES DYSPHASIC, IV FLUIDS AS NEEDED,  ANTIBIOTICS FOR INFECTION, RETURN TO HOSPITAL, FULL MEDICAL MANAGEMENT SHORT OF INTUBATION AND RESUSCITATION    # Case discussed in IDT rounds 12/4  Caregiver training in progress. patient requires 1-2 person assist for transfers , is non ambulatory, and currently working on medical follow up, equipment needs, home modifications and caregiver training in preparation for dc home. Will extend dc date to address these issues from 12/7/18 to 12/12/18      LABS:  CBC, CMP 12/10 ASSESSMENT/PLAN  86 year-old woman with a PMH of HTN found to have left ICA (MCA + CYNDEE) stroke s/p tPA  with dense right HP, global aphasia, dysphagia, hemisensory deficits    # Left ICA stroke unknown etiology- with global aphasia and functional quadriplegia  -Aspirin, Statin discontinued as stroke not thought to be of atherosclerotic origin. Was restarted later, which may be source of elevated LFTs. Discussed with neuro 12?4 who is agreeable with stopping statin. LFTs IMPROVED 12/7, continue to monitor  -continue Fluoxetine  -Continue OT, PT, SLP services  -add recreational therapies  -resting hand splint ordered for tone  - -dc tylenol for pain, add naproxen 12/1  -spasticity improved on balcofen 5 bid. monitor efficacy and LFTs stable, increase back to TID 12/7 as patient with good response, and LFTs improving likely statin-related    #Cardiovascular  - no PFO on ECHO, EF 74%, HTN  -Losartan.    #s/p Rx UTI, +Enterobacter on Cx  -completed Rx 11/29    #GI-  -dc tylenol with start of baclofen  -Colace and Senna changed from PRN to standing orders    -# DIET  upgarded to mechanical soft and honey thickened liquids    #ecchymoses rght chest wall, no known trauma, non tender  -Xray negative for rib fx. continue to monitor      #Code Status: As per meeting with Palliative Team at [facility]  pt is DNR/DNI,  TRIAL OF NG TUBE IF BECOMES DYSPHASIC, IV FLUIDS AS NEEDED,  ANTIBIOTICS FOR INFECTION, RETURN TO HOSPITAL, FULL MEDICAL MANAGEMENT SHORT OF INTUBATION AND RESUSCITATION    # Case discussed in IDT rounds 12/4  Caregiver training in progress. patient requires 1-2 person assist for transfers , is non ambulatory, and currently working on medical follow up, equipment needs, home modifications and caregiver training in preparation for dc home. Will extend dc date to address these issues from 12/7/18 to 12/12/18      LABS:  CBC, CMP 12/10

## 2018-12-08 PROCEDURE — 99232 SBSQ HOSP IP/OBS MODERATE 35: CPT

## 2018-12-08 PROCEDURE — 99232 SBSQ HOSP IP/OBS MODERATE 35: CPT | Mod: GC

## 2018-12-08 RX ADMIN — Medication 500000 UNIT(S): at 05:32

## 2018-12-08 RX ADMIN — Medication 20 MILLIGRAM(S): at 12:38

## 2018-12-08 RX ADMIN — Medication 81 MILLIGRAM(S): at 12:38

## 2018-12-08 RX ADMIN — ENOXAPARIN SODIUM 40 MILLIGRAM(S): 100 INJECTION SUBCUTANEOUS at 12:38

## 2018-12-08 RX ADMIN — Medication 250 MILLIGRAM(S): at 05:32

## 2018-12-08 RX ADMIN — LOSARTAN POTASSIUM 100 MILLIGRAM(S): 100 TABLET, FILM COATED ORAL at 05:32

## 2018-12-08 RX ADMIN — Medication 5 MILLIGRAM(S): at 05:32

## 2018-12-08 RX ADMIN — Medication 500000 UNIT(S): at 21:59

## 2018-12-08 RX ADMIN — Medication 5 MILLIGRAM(S): at 14:48

## 2018-12-08 RX ADMIN — Medication 5 MILLIGRAM(S): at 21:59

## 2018-12-08 RX ADMIN — Medication 1 TABLET(S): at 12:38

## 2018-12-08 RX ADMIN — Medication 500000 UNIT(S): at 14:48

## 2018-12-08 RX ADMIN — Medication 250 MILLIGRAM(S): at 17:14

## 2018-12-08 NOTE — PROGRESS NOTE ADULT - PSYCHIATRIC
Affect and characteristics of appearance, verbalizations, behaviors are appropriate

## 2018-12-08 NOTE — PROGRESS NOTE ADULT - SUBJECTIVE AND OBJECTIVE BOX
Patient is a 86y old  Female who presents with a chief complaint of CVA right HP , aphasia (07 Dec 2018 13:27)      HPI:  86 year old woman RH dominant with multiple vascular risk factors, including age and HTN was transferred from [facility] to [facility] for acute onset of right-sided weakness. She was noted to have developed acute onset of right-sided weakness and language disturbance. She was initially evaluated at [facility] through tele-stroke and was treated with IV tPA (Admission NIHSS 22). MRI brain showed left CYNDEE and MCA distribution infarct with mild hemorrhagic transformation (HI 1). 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    During hospital stay was neurologically without acute change: remains with global aphasia, right hemiplegia . Permissive hypertension was slowly titrated to normotension. Course was complicated by urinary retention which was managed with PRN straight catheterization. MBS was performed and patient was placed on a Dysphagia 1 honey diet which has been well-tolerated. Lipitor discontinued due to admission LDL of 104. Family meeting to discuss goals of care done on 11/8 2pm, family decided code status DNR/DNI. Discussed and completed MOLST form. Briefly,  pt is DNR/DNI,  TRIAL OF NG TUBE IF BECOMES DYSPHASIC, IV FLUIDS AS NEEDED,  ANTIBIOTICS FOR INFECTION, RETURN TO HOSPITAL, FULL MEDICAL MANAGEMENT SHORT OF INTUBATION AND RESUSCITATION. Cardiac monitoring revealed no events. TTE shows EF 74% no evidence of thrombus or PFO.  Deemed medically stable for acute rehab. (15 Nov 2018 16:23)      PAST MEDICAL & SURGICAL HISTORY:  HTN (hypertension)  Hypertension  S/P appendectomy      MEDICATIONS  (STANDING):  aspirin  chewable 81 milliGRAM(s) Oral daily  baclofen 5 milliGRAM(s) Oral three times a day  bisacodyl 5 milliGRAM(s) Oral at bedtime  docusate sodium 100 milliGRAM(s) Oral three times a day  enoxaparin Injectable 40 milliGRAM(s) SubCutaneous daily  FLUoxetine 20 milliGRAM(s) Oral daily  losartan 100 milliGRAM(s) Oral daily  multivitamin 1 Tablet(s) Oral daily  nystatin    Suspension 491738 Unit(s) Oral three times a day  polyethylene glycol 3350 17 Gram(s) Oral daily  saccharomyces boulardii 250 milliGRAM(s) Oral two times a day  senna 2 Tablet(s) Oral at bedtime    MEDICATIONS  (PRN):  bisacodyl Suppository 10 milliGRAM(s) Rectal daily PRN Constipation  naproxen 250 milliGRAM(s) Oral two times a day PRN Moderate Pain (4 - 6)  phenol 1.4% (CHLORASEPTIC) Oral Spray 1 Spray(s) Topical every 4 hours PRN sore throat pain        Allergies    No Known Allergies    Intolerances        TODAY'S SUBJECTIVE & REVIEW OF SYMPTOMS:  Patient was seen and examined. There were no acute events overnight.  No complaints offered although + aphasia.     PHYSICAL EXAM  Vital Signs Last 24 Hrs  T(C): 36.8 (08 Dec 2018 07:48), Max: 36.8 (08 Dec 2018 07:48)  T(F): 98.3 (08 Dec 2018 07:48), Max: 98.3 (08 Dec 2018 07:48)  HR: 78 (08 Dec 2018 07:48) (78 - 89)  BP: 157/76 (08 Dec 2018 07:48) (135/67 - 157/76)  BP(mean): --  RR: 14 (08 Dec 2018 07:48) (14 - 14)  SpO2: 98% (08 Dec 2018 07:48) (97% - 99%)    General: alert, globally aphasic. looks comfortable, in NAD  Resp: clear bilaterally no R/R/W  chest wall: ecchymoses slightly fading, no spread, no induration, no hematoma NTTP   Cardio : S1 and S2 regular  Abdomen:  +BS soft, NT ND  Extremities:  Right hemiparesis  resting splint in place  Calves soft and NTTP      RECENT LABS:          TPro  6.6  /  Alb  3.6  /  TBili  1.0  /  DBili  0.2  /  AST  51<H>  /  ALT  96<H>  /  AlkPhos  78  12-07    LIVER FUNCTIONS - ( 07 Dec 2018 06:55 )  Alb: 3.6 g/dL / Pro: 6.6 g/dL / ALK PHOS: 78 U/L / ALT: 96 U/L DA / AST: 51 U/L / GGT: x

## 2018-12-08 NOTE — PROGRESS NOTE ADULT - CRANIAL NERVE
has improving right facial droop, dysarthria note noted no interval change
right facial droop
right facial droop
right sided facial droop
right sided facial droop improving
cn 3-12 intact
right facial droop no interval change

## 2018-12-08 NOTE — PROGRESS NOTE ADULT - SENSORY
no gross sensory deficits
not able to assess
grossly intact
no gross deficits noted
no gross sensory deficits
no gross sensory deficits noted

## 2018-12-08 NOTE — PROGRESS NOTE ADULT - SUBJECTIVE AND OBJECTIVE BOX
HPI    Pt is a 87 yo F admitted to acute rehab for left ICA CVA  Pt was seen in the acute rehab setting, in wheelchair this am     no interval events     no chest pain no sob     ros all others are negative     Vital Signs Last 24 Hrs    T(C): 36.8 (08 Dec 2018 07:48), Max: 36.8 (08 Dec 2018 07:48)  T(F): 98.3 (08 Dec 2018 07:48), Max: 98.3 (08 Dec 2018 07:48)  HR: 78 (08 Dec 2018 07:48) (78 - 89)  BP: 157/76 (08 Dec 2018 07:48) (135/67 - 157/76)  BP(mean): --  RR: 14 (08 Dec 2018 07:48) (14 - 14)  SpO2: 98% (08 Dec 2018 07:48) (97% - 99%)    MEDICATIONS  (STANDING):  aspirin  chewable 81 milliGRAM(s) Oral daily  atorvastatin 40 milliGRAM(s) Oral at bedtime  baclofen 5 milliGRAM(s) Oral two times a day  bisacodyl 5 milliGRAM(s) Oral at bedtime  docusate sodium 100 milliGRAM(s) Oral three times a day  enoxaparin Injectable 40 milliGRAM(s) SubCutaneous daily  FLUoxetine 20 milliGRAM(s) Oral daily  losartan 100 milliGRAM(s) Oral daily  multivitamin 1 Tablet(s) Oral daily  nystatin    Suspension 596841 Unit(s) Oral three times a day  polyethylene glycol 3350 17 Gram(s) Oral daily  saccharomyces boulardii 250 milliGRAM(s) Oral two times a day  senna 2 Tablet(s) Oral at bedtime    MEDICATIONS  (PRN):  bisacodyl Suppository 10 milliGRAM(s) Rectal daily PRN Constipation  naproxen 250 milliGRAM(s) Oral two times a day PRN Moderate Pain (4 - 6)  phenol 1.4% (CHLORASEPTIC) Oral Spray 1 Spray(s) Topical every 4 hours PRN sore throat pain        TPro  6.6  /  Alb  3.6  /  TBili  1.0  /  DBili  0.2  /  AST  51<H>  /  ALT  96<H>  /  AlkPhos  78  12-07            TPro  6.1  /  Alb  3.3  /  TBili  1.0  /  DBili  0.3<H>  /  AST  60<H>  /  ALT  119<H>  /  AlkPhos  75  12-05

## 2018-12-08 NOTE — PROGRESS NOTE ADULT - MUSCULOSKELETAL
No joint pain, swelling or deformity; no limitation of movement

## 2018-12-08 NOTE — PROGRESS NOTE ADULT - MOTOR
right ue and le weakness 5/5
rigt ue and le weakness 4/5
right  sided ue and le 4/5 weakness
right sided ue and le noted 4/5
right ue and le weakness noted
right sided ue and le 3-4/5 weakness
right sided ue and le weakness noted

## 2018-12-08 NOTE — PROGRESS NOTE ADULT - ASSESSMENT
HPI    Pt is a 87 yo F admitted to acute rehab for left ICA CVA, improving right le weakness per son     *Left ICA ischemic CVA  with right sided weakness, right facial droop   cad risk management   Cont PT/OT/SLP   CAD risk management   Fluoxetine for motor improvement   fall and aspiration risk precautions     right sided ecchymosis most likely from mild trauma in shower to that area improving over time   no fall pre nursing staff and nursing aid   rib series negative   maintain fall risk precautions   continue the current VTE proph     LFT abn   no acute liver failure   review with neuro to see if statin can be dc.   trend out lft over time     *Essential HTN  Controlled  Cont Losartan   low sodium diet   low sodium diet     *dysphagia with aphasia  Pureed w honey thickened liquid  SLP  aspiration precautions     *DVT ppx   Lovenox     high risk for morbidity, mortality secondary to the above mentioned medical conditions   vte proph   reviewed with the patient plan of care

## 2018-12-08 NOTE — PROGRESS NOTE ADULT - NS ABD PE RECTAL EXAM
No mass or lesion
not examined

## 2018-12-08 NOTE — PROGRESS NOTE ADULT - BACK
No deformity or limitation of movement
detailed exam
No deformity or limitation of movement
No deformity or limitation of movement

## 2018-12-08 NOTE — PROGRESS NOTE ADULT - GUM GEN PE MLT EXAM PC
Normal genitalia; no lesions; no discharge
not examined
Normal genitalia; no lesions; no discharge

## 2018-12-08 NOTE — PROGRESS NOTE ADULT - NEUROLOGICAL
detailed exam
Alert & oriented; no sensory, motor or coordination deficits, normal reflexes
detailed exam
detailed exam

## 2018-12-08 NOTE — PROGRESS NOTE ADULT - GASTROINTESTINAL
Soft, non-tender, no hepatosplenomegaly, normal bowel sounds

## 2018-12-08 NOTE — PROGRESS NOTE ADULT - NEUROLOGICAL DETAILS
alert and oriented x 3/responds to pain/responds to verbal commands
alert and oriented x 3/responds to verbal commands/responds to pain/sensation intact
responds to verbal commands/sensation intact/alert and oriented x 3/responds to pain
responds to verbal commands/sensation intact/responds to pain/alert and oriented x 3/deep reflexes intact
responds to pain/responds to verbal commands/sensation intact/deep reflexes intact/alert and oriented x 3
responds to pain/responds to verbal commands/alert and oriented x 3
alert and oriented x 3

## 2018-12-08 NOTE — PROGRESS NOTE ADULT - ADDITIONAL PE
labs reviewed and analzyed
labs reviewed and analyzed

## 2018-12-09 LAB
HCT VFR BLD CALC: 32.2 % — LOW (ref 34.5–45)
HGB BLD-MCNC: 10.7 G/DL — LOW (ref 11.5–15.5)
MCHC RBC-ENTMCNC: 33.1 GM/DL — SIGNIFICANT CHANGE UP (ref 32–36)
MCHC RBC-ENTMCNC: 33.5 PG — SIGNIFICANT CHANGE UP (ref 27–34)
MCV RBC AUTO: 101.1 FL — HIGH (ref 80–100)
PLATELET # BLD AUTO: 363 K/UL — SIGNIFICANT CHANGE UP (ref 150–400)
RBC # BLD: 3.18 M/UL — LOW (ref 3.8–5.2)
RBC # FLD: 14 % — SIGNIFICANT CHANGE UP (ref 10.3–14.5)
WBC # BLD: 6.3 K/UL — SIGNIFICANT CHANGE UP (ref 3.8–10.5)
WBC # FLD AUTO: 6.3 K/UL — SIGNIFICANT CHANGE UP (ref 3.8–10.5)

## 2018-12-09 PROCEDURE — 99232 SBSQ HOSP IP/OBS MODERATE 35: CPT

## 2018-12-09 RX ORDER — ACETAMINOPHEN 500 MG
650 TABLET ORAL EVERY 6 HOURS
Qty: 0 | Refills: 0 | Status: DISCONTINUED | OUTPATIENT
Start: 2018-12-09 | End: 2018-12-11

## 2018-12-09 RX ADMIN — Medication 250 MILLIGRAM(S): at 06:29

## 2018-12-09 RX ADMIN — Medication 500000 UNIT(S): at 06:34

## 2018-12-09 RX ADMIN — Medication 500000 UNIT(S): at 13:38

## 2018-12-09 RX ADMIN — Medication 650 MILLIGRAM(S): at 22:18

## 2018-12-09 RX ADMIN — Medication 5 MILLIGRAM(S): at 13:38

## 2018-12-09 RX ADMIN — Medication 1 TABLET(S): at 11:59

## 2018-12-09 RX ADMIN — Medication 81 MILLIGRAM(S): at 11:59

## 2018-12-09 RX ADMIN — Medication 250 MILLIGRAM(S): at 17:21

## 2018-12-09 RX ADMIN — LOSARTAN POTASSIUM 100 MILLIGRAM(S): 100 TABLET, FILM COATED ORAL at 06:29

## 2018-12-09 RX ADMIN — Medication 20 MILLIGRAM(S): at 11:59

## 2018-12-09 RX ADMIN — Medication 5 MILLIGRAM(S): at 22:17

## 2018-12-09 RX ADMIN — ENOXAPARIN SODIUM 40 MILLIGRAM(S): 100 INJECTION SUBCUTANEOUS at 11:59

## 2018-12-09 RX ADMIN — Medication 650 MILLIGRAM(S): at 23:00

## 2018-12-09 RX ADMIN — Medication 650 MILLIGRAM(S): at 12:36

## 2018-12-09 RX ADMIN — Medication 5 MILLIGRAM(S): at 06:30

## 2018-12-09 RX ADMIN — Medication 650 MILLIGRAM(S): at 11:59

## 2018-12-09 NOTE — PROGRESS NOTE ADULT - SUBJECTIVE AND OBJECTIVE BOX
Patient is a 86y old  Female who presents with a chief complaint of CVA right HP , aphasia (07 Dec 2018 13:27)      HPI:  86 year old woman RH dominant with multiple vascular risk factors, including age and HTN was transferred from [facility] to [facility] for acute onset of right-sided weakness. She was noted to have developed acute onset of right-sided weakness and language disturbance. She was initially evaluated at [facility] through tele-stroke and was treated with IV tPA (Admission NIHSS 22). MRI brain showed left CYNDEE and MCA distribution infarct with mild hemorrhagic transformation (HI 1). 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    During hospital stay was neurologically without acute change: remains with global aphasia, right hemiplegia . Permissive hypertension was slowly titrated to normotension. Course was complicated by urinary retention which was managed with PRN straight catheterization. MBS was performed and patient was placed on a Dysphagia 1 honey diet which has been well-tolerated. Lipitor discontinued due to admission LDL of 104. Family meeting to discuss goals of care done on 11/8 2pm, family decided code status DNR/DNI. Discussed and completed MOLST form. Briefly,  pt is DNR/DNI,  TRIAL OF NG TUBE IF BECOMES DYSPHASIC, IV FLUIDS AS NEEDED,  ANTIBIOTICS FOR INFECTION, RETURN TO HOSPITAL, FULL MEDICAL MANAGEMENT SHORT OF INTUBATION AND RESUSCITATION. Cardiac monitoring revealed no events. TTE shows EF 74% no evidence of thrombus or PFO.  Deemed medically stable for acute rehab. (15 Nov 2018 16:23)      PAST MEDICAL & SURGICAL HISTORY:  HTN (hypertension)  Hypertension  S/P appendectomy      MEDICATIONS  (STANDING):  aspirin  chewable 81 milliGRAM(s) Oral daily  baclofen 5 milliGRAM(s) Oral three times a day  bisacodyl 5 milliGRAM(s) Oral at bedtime  docusate sodium 100 milliGRAM(s) Oral three times a day  enoxaparin Injectable 40 milliGRAM(s) SubCutaneous daily  FLUoxetine 20 milliGRAM(s) Oral daily  losartan 100 milliGRAM(s) Oral daily  multivitamin 1 Tablet(s) Oral daily  nystatin    Suspension 566814 Unit(s) Oral three times a day  polyethylene glycol 3350 17 Gram(s) Oral daily  saccharomyces boulardii 250 milliGRAM(s) Oral two times a day  senna 2 Tablet(s) Oral at bedtime    MEDICATIONS  (PRN):  acetaminophen   Tablet .. 650 milliGRAM(s) Oral every 6 hours PRN Moderate Pain (4 - 6)  bisacodyl Suppository 10 milliGRAM(s) Rectal daily PRN Constipation  naproxen 250 milliGRAM(s) Oral two times a day PRN Mild Pain (1 - 3)  phenol 1.4% (CHLORASEPTIC) Oral Spray 1 Spray(s) Topical every 4 hours PRN sore throat pain        Allergies    No Known Allergies    Intolerances        TODAY'S SUBJECTIVE & REVIEW OF SYMPTOMS:  Patient was seen and examined. There were no acute events overnight.  No complaints offered    PHYSICAL EXAM  Vital Signs Last 24 Hrs  T(C): 36.6 (09 Dec 2018 07:48), Max: 36.9 (08 Dec 2018 21:00)  T(F): 97.9 (09 Dec 2018 07:48), Max: 98.5 (08 Dec 2018 21:00)  HR: 89 (09 Dec 2018 07:48) (73 - 89)  BP: 143/58 (09 Dec 2018 07:48) (143/58 - 151/78)  BP(mean): --  RR: 14 (09 Dec 2018 07:48) (14 - 14)  SpO2: 96% (09 Dec 2018 07:48) (96% - 98%)    General: alert, globally aphasic. looks comfortable, in NAD  Resp: clear bilaterally no R/R/W  chest wall: ecchymoses, NTTP   Cardio : S1 and S2 regular  Abdomen:  +BS soft, NT ND  Extremities:  Right hemiparesis  resting splint in place  Calves soft and NTTP    Functional status:  Transfers: Min A  Gait: Amb 8' hemiwalker WC follow mod A  ADLs: Dressing min A      RECENT LABS:          TPro  6.6  /  Alb  3.6  /  TBili  1.0  /  DBili  0.2  /  AST  51<H>  /  ALT  96<H>  /  AlkPhos  78  12-07    LIVER FUNCTIONS - ( 07 Dec 2018 06:55 )  Alb: 3.6 g/dL / Pro: 6.6 g/dL / ALK PHOS: 78 U/L / ALT: 96 U/L DA / AST: 51 U/L / GGT: x Patient is a 86y old  Female who presents with a chief complaint of CVA right HP , aphasia (07 Dec 2018 13:27)      HPI:  86 year old woman RH dominant with multiple vascular risk factors, including age and HTN was transferred from [facility] to [facility] for acute onset of right-sided weakness. She was noted to have developed acute onset of right-sided weakness and language disturbance. She was initially evaluated at [facility] through tele-stroke and was treated with IV tPA (Admission NIHSS 22). MRI brain showed left CYNDEE and MCA distribution infarct with mild hemorrhagic transformation (HI 1). 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    During hospital stay was neurologically without acute change: remains with global aphasia, right hemiplegia . Permissive hypertension was slowly titrated to normotension. Course was complicated by urinary retention which was managed with PRN straight catheterization. MBS was performed and patient was placed on a Dysphagia 1 honey diet which has been well-tolerated. Lipitor discontinued due to admission LDL of 104. Family meeting to discuss goals of care done on 11/8 2pm, family decided code status DNR/DNI. Discussed and completed MOLST form. Briefly,  pt is DNR/DNI,  TRIAL OF NG TUBE IF BECOMES DYSPHASIC, IV FLUIDS AS NEEDED,  ANTIBIOTICS FOR INFECTION, RETURN TO HOSPITAL, FULL MEDICAL MANAGEMENT SHORT OF INTUBATION AND RESUSCITATION. Cardiac monitoring revealed no events. TTE shows EF 74% no evidence of thrombus or PFO.  Deemed medically stable for acute rehab. (15 Nov 2018 16:23)      PAST MEDICAL & SURGICAL HISTORY:  HTN (hypertension)  Hypertension  S/P appendectomy      MEDICATIONS  (STANDING):  aspirin  chewable 81 milliGRAM(s) Oral daily  baclofen 5 milliGRAM(s) Oral three times a day  bisacodyl 5 milliGRAM(s) Oral at bedtime  docusate sodium 100 milliGRAM(s) Oral three times a day  enoxaparin Injectable 40 milliGRAM(s) SubCutaneous daily  FLUoxetine 20 milliGRAM(s) Oral daily  losartan 100 milliGRAM(s) Oral daily  multivitamin 1 Tablet(s) Oral daily  nystatin    Suspension 027402 Unit(s) Oral three times a day  polyethylene glycol 3350 17 Gram(s) Oral daily  saccharomyces boulardii 250 milliGRAM(s) Oral two times a day  senna 2 Tablet(s) Oral at bedtime    MEDICATIONS  (PRN):  acetaminophen   Tablet .. 650 milliGRAM(s) Oral every 6 hours PRN Moderate Pain (4 - 6)  bisacodyl Suppository 10 milliGRAM(s) Rectal daily PRN Constipation  naproxen 250 milliGRAM(s) Oral two times a day PRN Mild Pain (1 - 3)  phenol 1.4% (CHLORASEPTIC) Oral Spray 1 Spray(s) Topical every 4 hours PRN sore throat pain        Allergies    No Known Allergies    Intolerances        TODAY'S SUBJECTIVE & REVIEW OF SYMPTOMS:  Patient was seen and examined. There were no acute events overnight.  No complaints offered    PHYSICAL EXAM  Vital Signs Last 24 Hrs  T(C): 36.6 (09 Dec 2018 07:48), Max: 36.9 (08 Dec 2018 21:00)  T(F): 97.9 (09 Dec 2018 07:48), Max: 98.5 (08 Dec 2018 21:00)  HR: 89 (09 Dec 2018 07:48) (73 - 89)  BP: 143/58 (09 Dec 2018 07:48) (143/58 - 151/78)  BP(mean): --  RR: 14 (09 Dec 2018 07:48) (14 - 14)  SpO2: 96% (09 Dec 2018 07:48) (96% - 98%)    General: alert, globally aphasic. looks comfortable, in NAD  Resp: clear bilaterally no R/R/W  chest wall: ecchymoses, NTTP   Cardio : S1 and S2 regular  Abdomen:  +BS soft, NT ND  Extremities:  Right hemiparesis  resting splint in place  Calves soft and NTTP    Functional status:  Transfers: Min A  Gait: Amb 8' hemiwalker WC follow mod A  ADLs: Dressing min A      RECENT LABS:                          10.7   6.3   )-----------( 363      ( 09 Dec 2018 12:37 )             32.2         TPro  6.6  /  Alb  3.6  /  TBili  1.0  /  DBili  0.2  /  AST  51<H>  /  ALT  96<H>  /  AlkPhos  78  12-07    LIVER FUNCTIONS - ( 07 Dec 2018 06:55 )  Alb: 3.6 g/dL / Pro: 6.6 g/dL / ALK PHOS: 78 U/L / ALT: 96 U/L DA / AST: 51 U/L / GGT: x

## 2018-12-09 NOTE — PROGRESS NOTE ADULT - ASSESSMENT
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 Was restarted later, which may be source of elevated LFTs. Discussed with neuro 12/4 who is agreeable with stopping statin. LFTs IMPROVED 12/7, continue to monitor  -continue Fluoxetine  -Continue OT, PT, SLP services  -Recreational therapies  -resting hand splint ordered for tone  - -dcd tylenol for pain, added naproxen 12/1  -spasticity improved on balcofen 5 bid. monitor efficacy and LFTs stable, increased back to TID 12/7 as patient with good response, and LFTs improving likely statin-related    #Cardiovascular  - no PFO on ECHO, EF 74%, HTN  -Losartan.    #s/p Rx UTI, +Enterobacter on Cx  -completed Rx 11/29    #GI-  -dc tylenol with start of baclofen  -Colace and Senna changed from PRN to standing orders    #Elevated LFTs  -LFTs improved.  Tylenol discontinued  F/U labs today    #Anemia  -F/U CBC today    -# DIET  upgraded to mechanical soft and honey thickened liquids    #ecchymoses right chest wall, no known trauma, non tender  -Xray negative for rib fx. continue to monitor      #Code Status: As per meeting with Palliative Team at [facility]  pt is DNR/DNI,  TRIAL OF NG TUBE IF BECOMES DYSPHASIC, IV FLUIDS AS NEEDED,  ANTIBIOTICS FOR INFECTION, RETURN TO HOSPITAL, FULL MEDICAL MANAGEMENT SHORT OF INTUBATION AND RESUSCITATION    # Case discussed in IDT rounds 12/4  Caregiver training in progress. patient requires 1-2 person assist for transfers , is non ambulatory, and currently working on medical follow up, equipment needs, home modifications and caregiver training in preparation for dc home. Extend dc date to address these issues from 12/7/18 to 12/12/18      LABS:  CBC, CMP 12/9 ASSESSMENT/PLAN  86 year-old woman with a PMH of HTN found to have left ICA (MCA + CYNDEE) stroke s/p tPA  with dense right HP, global aphasia, dysphagia, hemisensory deficits    # Left ICA stroke unknown etiology- with global aphasia and functional quadriplegia  -Aspirin, Statin discontinued as stroke not thought to be of atherosclerotic origin. Was restarted later, which may be source of elevated LFTs. Discussed with neuro 12/4 who is agreeable with stopping statin. LFTs IMPROVED 12/7, continue to monitor  -continue Fluoxetine  -Continue OT, PT, SLP services  -Recreational therapies  -resting hand splint ordered for tone  - -dcd tylenol for pain, added naproxen 12/1  -spasticity improved on balcofen 5 bid. monitor efficacy and LFTs stable, increased back to TID 12/7 as patient with good response, and LFTs improving likely statin-related    #Cardiovascular  - no PFO on ECHO, EF 74%, HTN  -Losartan.    #s/p Rx UTI, +Enterobacter on Cx  -completed Rx 11/29    #GI-  -dc tylenol with start of baclofen  -Colace and Senna changed from PRN to standing orders    #Elevated LFTs  -LFTs improved.  Tylenol discontinued  F/U labs today    #Anemia  -F/U CBC today.  Hb increased from 9.3 to 10.7 today    -# DIET  upgraded to mechanical soft and honey thickened liquids    #ecchymoses right chest wall, no known trauma, non tender  -Xray negative for rib fx. continue to monitor      #Code Status: As per meeting with Palliative Team at [facility]  pt is DNR/DNI,  TRIAL OF NG TUBE IF BECOMES DYSPHASIC, IV FLUIDS AS NEEDED,  ANTIBIOTICS FOR INFECTION, RETURN TO HOSPITAL, FULL MEDICAL MANAGEMENT SHORT OF INTUBATION AND RESUSCITATION    # Case discussed in IDT rounds 12/4  Caregiver training in progress. patient requires 1-2 person assist for transfers , is non ambulatory, and currently working on medical follow up, equipment needs, home modifications and caregiver training in preparation for dc home. Extend dc date to address these issues from 12/7/18 to 12/12/18

## 2018-12-10 LAB
ALBUMIN SERPL ELPH-MCNC: 3.5 G/DL — SIGNIFICANT CHANGE UP (ref 3.3–5)
ALP SERPL-CCNC: 81 U/L — SIGNIFICANT CHANGE UP (ref 40–120)
ALT FLD-CCNC: 59 U/L DA — HIGH (ref 10–45)
ANION GAP SERPL CALC-SCNC: 9 MMOL/L — SIGNIFICANT CHANGE UP (ref 5–17)
AST SERPL-CCNC: 30 U/L — SIGNIFICANT CHANGE UP (ref 10–40)
BILIRUB SERPL-MCNC: 0.7 MG/DL — SIGNIFICANT CHANGE UP (ref 0.2–1.2)
BUN SERPL-MCNC: 14 MG/DL — SIGNIFICANT CHANGE UP (ref 7–23)
CALCIUM SERPL-MCNC: 9.7 MG/DL — SIGNIFICANT CHANGE UP (ref 8.4–10.5)
CHLORIDE SERPL-SCNC: 102 MMOL/L — SIGNIFICANT CHANGE UP (ref 96–108)
CO2 SERPL-SCNC: 25 MMOL/L — SIGNIFICANT CHANGE UP (ref 22–31)
CREAT SERPL-MCNC: 0.72 MG/DL — SIGNIFICANT CHANGE UP (ref 0.5–1.3)
GLUCOSE SERPL-MCNC: 111 MG/DL — HIGH (ref 70–99)
POTASSIUM SERPL-MCNC: 3.9 MMOL/L — SIGNIFICANT CHANGE UP (ref 3.5–5.3)
POTASSIUM SERPL-SCNC: 3.9 MMOL/L — SIGNIFICANT CHANGE UP (ref 3.5–5.3)
PROT SERPL-MCNC: 6.6 G/DL — SIGNIFICANT CHANGE UP (ref 6–8.3)
SODIUM SERPL-SCNC: 136 MMOL/L — SIGNIFICANT CHANGE UP (ref 135–145)

## 2018-12-10 PROCEDURE — 99232 SBSQ HOSP IP/OBS MODERATE 35: CPT

## 2018-12-10 RX ADMIN — Medication 5 MILLIGRAM(S): at 21:20

## 2018-12-10 RX ADMIN — ENOXAPARIN SODIUM 40 MILLIGRAM(S): 100 INJECTION SUBCUTANEOUS at 11:53

## 2018-12-10 RX ADMIN — Medication 100 MILLIGRAM(S): at 21:21

## 2018-12-10 RX ADMIN — Medication 250 MILLIGRAM(S): at 05:38

## 2018-12-10 RX ADMIN — Medication 81 MILLIGRAM(S): at 11:52

## 2018-12-10 RX ADMIN — SENNA PLUS 2 TABLET(S): 8.6 TABLET ORAL at 21:22

## 2018-12-10 RX ADMIN — Medication 5 MILLIGRAM(S): at 05:38

## 2018-12-10 RX ADMIN — Medication 20 MILLIGRAM(S): at 11:52

## 2018-12-10 RX ADMIN — Medication 100 MILLIGRAM(S): at 15:00

## 2018-12-10 RX ADMIN — Medication 500000 UNIT(S): at 21:21

## 2018-12-10 RX ADMIN — LOSARTAN POTASSIUM 100 MILLIGRAM(S): 100 TABLET, FILM COATED ORAL at 05:38

## 2018-12-10 RX ADMIN — Medication 250 MILLIGRAM(S): at 17:12

## 2018-12-10 RX ADMIN — Medication 500000 UNIT(S): at 15:00

## 2018-12-10 RX ADMIN — Medication 5 MILLIGRAM(S): at 15:00

## 2018-12-10 RX ADMIN — Medication 500000 UNIT(S): at 05:38

## 2018-12-10 RX ADMIN — Medication 1 TABLET(S): at 11:53

## 2018-12-10 RX ADMIN — Medication 5 MILLIGRAM(S): at 21:21

## 2018-12-10 NOTE — PROGRESS NOTE ADULT - ASSESSMENT
HPI    Pt is a 87 yo F admitted to acute rehab for left ICA CVA, improving right le weakness per son     *Left ICA ischemic CVA  with right sided weakness, right facial droop, clinically improving over time  cad risk management   Cont PT/OT/SLP   CAD risk management   Fluoxetine for motor improvement   fall and aspiration risk precautions     right sided ecchymosis most likely from mild trauma in shower to that area improving over time   no fall pre nursing staff and nursing aid   rib series negative   maintain fall risk precautions   continue the current VTE proph     LFT abn   no acute liver failure   review with neuro to see if statin can be dc.   trend out lft over time     *Essential HTN  Controlled  Cont Losartan   low sodium diet   low sodium diet     *dysphagia with aphasia  Pureed w honey thickened liquid  SLP  aspiration precautions     *DVT ppx   Lovenox     high risk for morbidity, mortality secondary to the above mentioned medical conditions   vte proph   reviewed with the patient ans son plan of care

## 2018-12-10 NOTE — PROGRESS NOTE ADULT - SUBJECTIVE AND OBJECTIVE BOX
HPI    Pt is a 87 yo F admitted to acute rehab for left ICA CVA  Pt was seen in the acute rehab setting,  gym this am with son      no interval events     no chest pain no sob     mild right arm pain, most likely related to msk     ros all others are negative     Vital Signs Last 24 Hrs    Vital Signs Last 24 Hrs  T(C): 36.8 (10 Dec 2018 08:13), Max: 36.8 (09 Dec 2018 20:11)  T(F): 98.2 (10 Dec 2018 08:13), Max: 98.3 (09 Dec 2018 20:11)  HR: 72 (10 Dec 2018 08:13) (72 - 84)  BP: 147/81 (10 Dec 2018 08:13) (132/71 - 147/81)  BP(mean): --  RR: 14 (10 Dec 2018 08:13) (14 - 14)  SpO2: 97% (10 Dec 2018 08:13) (97% - 98%)    MEDICATIONS  (STANDING):  aspirin  chewable 81 milliGRAM(s) Oral daily  atorvastatin 40 milliGRAM(s) Oral at bedtime  baclofen 5 milliGRAM(s) Oral two times a day  bisacodyl 5 milliGRAM(s) Oral at bedtime  docusate sodium 100 milliGRAM(s) Oral three times a day  enoxaparin Injectable 40 milliGRAM(s) SubCutaneous daily  FLUoxetine 20 milliGRAM(s) Oral daily  losartan 100 milliGRAM(s) Oral daily  multivitamin 1 Tablet(s) Oral daily  nystatin    Suspension 914240 Unit(s) Oral three times a day  polyethylene glycol 3350 17 Gram(s) Oral daily  saccharomyces boulardii 250 milliGRAM(s) Oral two times a day  senna 2 Tablet(s) Oral at bedtime    MEDICATIONS  (PRN):  bisacodyl Suppository 10 milliGRAM(s) Rectal daily PRN Constipation  naproxen 250 milliGRAM(s) Oral two times a day PRN Moderate Pain (4 - 6)  phenol 1.4% (CHLORASEPTIC) Oral Spray 1 Spray(s) Topical every 4 hours PRN sore throat pain    CBC Full  -  ( 09 Dec 2018 12:37 )  WBC Count : 6.3 K/uL  Hemoglobin : 10.7 g/dL  Hematocrit : 32.2 %  Platelet Count - Automated : 363 K/uL  Mean Cell Volume : 101.1 fl  Mean Cell Hemoglobin : 33.5 pg  Mean Cell Hemoglobin Concentration : 33.1 gm/dL  Auto Neutrophil # : x  Auto Lymphocyte # : x  Auto Monocyte # : x  Auto Eosinophil # : x  Auto Basophil # : x  Auto Neutrophil % : x  Auto Lymphocyte % : x  Auto Monocyte % : x  Auto Eosinophil % : x  Auto Basophil % : x    12-10    136  |  102  |  14  ----------------------------<  111<H>  3.9   |  25  |  0.72    Ca    9.7      10 Dec 2018 07:00    TPro  6.6  /  Alb  3.5  /  TBili  0.7  /  DBili  x   /  AST  30  /  ALT  59<H>  /  AlkPhos  81  12-10          TPro  6.6  /  Alb  3.6  /  TBili  1.0  /  DBili  0.2  /  AST  51<H>  /  ALT  96<H>  /  AlkPhos  78  12-07            TPro  6.1  /  Alb  3.3  /  TBili  1.0  /  DBili  0.3<H>  /  AST  60<H>  /  ALT  119<H>  /  AlkPhos  75  12-05

## 2018-12-10 NOTE — PROGRESS NOTE ADULT - ASSESSMENT
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 Was restarted later, which may be source of elevated LFTs. Discussed with neuro 12/4 who is agreeable with stopping statin. LFTs conitnue to improve, disucssed with family  -continue Fluoxetine  -Continue OT, PT, SLP services  -Recreational therapies  -resting hand splint ordered for tone  - - naproxen 12/1 added for pain  -balcofen increased back to TID 12/7 for spasticity as patient with good response, and LFTs normalizing    #Cardiovascular  - no PFO on ECHO, EF 74%, HTN  -Losartan.    #GI-  -dc tylenol with start of baclofen. monitor LFTs Thurs 12/13  -Colace and Senna changed from PRN to standing orders    #Elevated LFTs  -LFTs improved.  Tylenol discontinued  F/U labs today    #Anemia  -H/H stable. f/u as outpatient    -# DIET  upgraded to mechanical soft and honey thickened liquids  -hydration status stable    #ecchymoses right chest wall, no known trauma  -improving no fracture      #Code Status: As per meeting with Palliative Team at [facility]  pt is DNR/DNI,  TRIAL OF NG TUBE IF BECOMES DYSPHASIC, IV FLUIDS AS NEEDED,  ANTIBIOTICS FOR INFECTION, RETURN TO HOSPITAL, FULL MEDICAL MANAGEMENT SHORT OF INTUBATION AND RESUSCITATION    # Case discussed in IDT rounds 12/4  Caregiver training in progress. patient requires 1-2 person assist for transfers , is non ambulatory, and currently working on medical follow up, equipment needs, home modifications and caregiver training in preparation for dc home. Extend dc date to address these issues from 12/7/18 to 12/12/18        LABS  LFts thurs 12/13 if patient not dc otherwise f/u as outpatient

## 2018-12-10 NOTE — PROGRESS NOTE ADULT - SUBJECTIVE AND OBJECTIVE BOX
Patient is a 86y old  Female who presents with a chief complaint of CVA right HP , aphasia (09 Dec 2018 10:54)      HPI:  86 year old woman RH dominant with multiple vascular risk factors, including age and HTN was transferred from [facility] to [facility] for acute onset of right-sided weakness. She was noted to have developed acute onset of right-sided weakness and language disturbance. She was initially evaluated at [facility] through tele-stroke and was treated with IV tPA (Admission NIHSS 22). MRI brain showed left CYNDEE and MCA distribution infarct with mild hemorrhagic transformation (HI 1). 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    During hospital stay was neurologically without acute change: remains with global aphasia, right hemiplegia . Permissive hypertension was slowly titrated to normotension. Course was complicated by urinary retention which was managed with PRN straight catheterization. MBS was performed and patient was placed on a Dysphagia 1 honey diet which has been well-tolerated. Lipitor discontinued due to admission LDL of 104. Family meeting to discuss goals of care done on 11/8 2pm, family decided code status DNR/DNI. Discussed and completed MOLST form. Briefly,  pt is DNR/DNI,  TRIAL OF NG TUBE IF BECOMES DYSPHASIC, IV FLUIDS AS NEEDED,  ANTIBIOTICS FOR INFECTION, RETURN TO HOSPITAL, FULL MEDICAL MANAGEMENT SHORT OF INTUBATION AND RESUSCITATION. Cardiac monitoring revealed no events. TTE shows EF 74% no evidence of thrombus or PFO.  Deemed medically stable for acute rehab. (15 Nov 2018 16:23)      PAST MEDICAL & SURGICAL HISTORY:  HTN (hypertension)  Hypertension  S/P appendectomy      MEDICATIONS  (STANDING):  aspirin  chewable 81 milliGRAM(s) Oral daily  baclofen 5 milliGRAM(s) Oral three times a day  bisacodyl 5 milliGRAM(s) Oral at bedtime  docusate sodium 100 milliGRAM(s) Oral three times a day  enoxaparin Injectable 40 milliGRAM(s) SubCutaneous daily  FLUoxetine 20 milliGRAM(s) Oral daily  losartan 100 milliGRAM(s) Oral daily  multivitamin 1 Tablet(s) Oral daily  nystatin    Suspension 030386 Unit(s) Oral three times a day  polyethylene glycol 3350 17 Gram(s) Oral daily  saccharomyces boulardii 250 milliGRAM(s) Oral two times a day  senna 2 Tablet(s) Oral at bedtime    MEDICATIONS  (PRN):  acetaminophen   Tablet .. 650 milliGRAM(s) Oral every 6 hours PRN Moderate Pain (4 - 6)  bisacodyl Suppository 10 milliGRAM(s) Rectal daily PRN Constipation  naproxen 250 milliGRAM(s) Oral two times a day PRN Mild Pain (1 - 3)  phenol 1.4% (CHLORASEPTIC) Oral Spray 1 Spray(s) Topical every 4 hours PRN sore throat pain      Allergies    No Known Allergies    Intolerances        TODAY'S SUBJECTIVE & REVIEW OF SYMPTOMS:    Patient seen with son at bedside. Weekend was improved, less painful. Also noted to have improved yes/no reliability, less perseverative. Sitting in chair, improved positioning and balance    PHYSICAL EXAM  86y  Vital Signs Last 24 Hrs  T(C): 36.8 (10 Dec 2018 08:13), Max: 36.8 (09 Dec 2018 20:11)  T(F): 98.2 (10 Dec 2018 08:13), Max: 98.3 (09 Dec 2018 20:11)  HR: 72 (10 Dec 2018 08:13) (72 - 84)  BP: 147/81 (10 Dec 2018 08:13) (132/71 - 147/81)  BP(mean): --  RR: 14 (10 Dec 2018 08:13) (14 - 14)  SpO2: 97% (10 Dec 2018 08:13) (97% - 98%)  Daily     Daily     General: patient alert, not guarding right side. Globally aphasic but less apraxic and perseverative on "no" and use of "yes" gesture    Resp: clear bilaterally  reduced ecchymoses right side, NT  Cardio: RRR HR 72  Abdomen: soft +Bs ND NT  Extremities: right UE +shoulder subxluation, guarding shoulder with PROm, however hand and elbow tone greatly improved and less uncomfortable  hamstring tighness improved, improved PROM hip and knee  +AROm quad 3-/5  no calf swelling +soft      RECENT LABS:                          10.7   6.3   )-----------( 363      ( 09 Dec 2018 12:37 )             32.2     12-10    136  |  102  |  14  ----------------------------<  111<H>  3.9   |  25  |  0.72    Ca    9.7      10 Dec 2018 07:00    TPro  6.6  /  Alb  3.5  /  TBili  0.7  /  DBili  x   /  AST  30  /  ALT  59<H>  /  AlkPhos  81  12-10    LIVER FUNCTIONS - ( 10 Dec 2018 07:00 )  Alb: 3.5 g/dL / Pro: 6.6 g/dL / ALK PHOS: 81 U/L / ALT: 59 U/L DA / AST: 30 U/L / GGT: x                   CAPILLARY BLOOD GLUCOSE        IMPRESSION AND PLAN:

## 2018-12-11 PROCEDURE — 99233 SBSQ HOSP IP/OBS HIGH 50: CPT

## 2018-12-11 PROCEDURE — 99232 SBSQ HOSP IP/OBS MODERATE 35: CPT

## 2018-12-11 RX ADMIN — Medication 100 MILLIGRAM(S): at 12:42

## 2018-12-11 RX ADMIN — Medication 250 MILLIGRAM(S): at 12:41

## 2018-12-11 RX ADMIN — Medication 250 MILLIGRAM(S): at 05:50

## 2018-12-11 RX ADMIN — LOSARTAN POTASSIUM 100 MILLIGRAM(S): 100 TABLET, FILM COATED ORAL at 06:22

## 2018-12-11 RX ADMIN — Medication 100 MILLIGRAM(S): at 21:11

## 2018-12-11 RX ADMIN — Medication 20 MILLIGRAM(S): at 12:41

## 2018-12-11 RX ADMIN — Medication 250 MILLIGRAM(S): at 06:20

## 2018-12-11 RX ADMIN — Medication 500000 UNIT(S): at 06:21

## 2018-12-11 RX ADMIN — Medication 30 MILLILITER(S): at 05:04

## 2018-12-11 RX ADMIN — ENOXAPARIN SODIUM 40 MILLIGRAM(S): 100 INJECTION SUBCUTANEOUS at 12:42

## 2018-12-11 RX ADMIN — Medication 1 TABLET(S): at 12:41

## 2018-12-11 RX ADMIN — Medication 5 MILLIGRAM(S): at 12:42

## 2018-12-11 RX ADMIN — Medication 81 MILLIGRAM(S): at 12:42

## 2018-12-11 RX ADMIN — Medication 5 MILLIGRAM(S): at 21:11

## 2018-12-11 RX ADMIN — Medication 5 MILLIGRAM(S): at 06:21

## 2018-12-11 RX ADMIN — Medication 250 MILLIGRAM(S): at 13:00

## 2018-12-11 RX ADMIN — Medication 250 MILLIGRAM(S): at 04:50

## 2018-12-11 NOTE — PROGRESS NOTE ADULT - SUBJECTIVE AND OBJECTIVE BOX
Patient is a 86y old  Female who presents with a chief complaint of CVA right HP , aphasia (10 Dec 2018 11:16)      Patient seen and examined at bedside.    ALLERGIES:  No Known Allergies    MEDICATIONS:  acetaminophen   Tablet .. 650 milliGRAM(s) Oral every 6 hours PRN  baclofen 5 milliGRAM(s) Oral three times a day  bisacodyl 5 milliGRAM(s) Oral at bedtime  bisacodyl Suppository 10 milliGRAM(s) Rectal daily PRN  docusate sodium 100 milliGRAM(s) Oral three times a day  multivitamin 1 Tablet(s) Oral daily  naproxen 250 milliGRAM(s) Oral two times a day PRN  nystatin    Suspension 547306 Unit(s) Oral three times a day  phenol 1.4% (CHLORASEPTIC) Oral Spray 1 Spray(s) Topical every 4 hours PRN  polyethylene glycol 3350 17 Gram(s) Oral daily  saccharomyces boulardii 250 milliGRAM(s) Oral two times a day  senna 2 Tablet(s) Oral at bedtime    Vital Signs Last 24 Hrs  T(F): 98.2 (11 Dec 2018 08:45), Max: 98.4 (10 Dec 2018 22:08)  HR: 86 (11 Dec 2018 08:45) (78 - 86)  BP: 154/92 (11 Dec 2018 08:45) (147/72 - 154/92)  RR: 14 (11 Dec 2018 08:45) (14 - 14)  SpO2: 99% (11 Dec 2018 08:45) (99% - 99%)  I&O's Summary      PHYSICAL EXAM:  General: NAD, Aphasic   ENT: MMM  Neck: Supple, No JVD  Lungs: Clear to auscultation bilaterally  Cardio: RRR, S1/S2, No murmurs  Abdomen: Soft, Nontender, Nondistended; Bowel sounds present  Extremities: No clubbing, cyanosis, or edema      LABS:                        10.7   6.3   )-----------( 363      ( 09 Dec 2018 12:37 )             32.2     12-10    136  |  102  |  14  ----------------------------<  111  3.9   |  25  |  0.72    Ca    9.7      10 Dec 2018 07:00    TPro  6.6  /  Alb  3.5  /  TBili  0.7  /  DBili  x   /  AST  30  /  ALT  59  /  AlkPhos  81  12-10    eGFR if Non African American: 76 mL/min/1.73M2 (12-10-18 @ 07:00)  eGFR if : 88 mL/min/1.73M2 (12-10-18 @ 07:00)            11-03 Chol 169 mg/dL  mg/dL HDL 54 mg/dL Trig 57 mg/dL        CAPILLARY BLOOD GLUCOSE        11-03 [id number] 5.4          RADIOLOGY & ADDITIONAL TESTS:    Care Discussed with Consultants/Other Providers:

## 2018-12-11 NOTE — PROGRESS NOTE ADULT - ASSESSMENT
ASSESSMENT/PLAN  86 year-old woman with a PMH of HTN found to have left ICA (MCA + CYNDEE) stroke s/p tPA  with dense right HP, global aphasia, dysphagia, hemisensory deficits    # Left ICA stroke unknown etiology-   - no PFO on ECHO, EF 74%  c/w ASA       # Global aphasia and functional quadriplegia with ADL impairment   -Continue OT, PT, SLP      #HTN - controlled   -c/w Losartan.      # Transaminasemia - resolving   -LFTs improved.  Tylenol discontinued  F/U labs today  - Statis discontinued.     #Anemia  -H/H stable. f/u as outpatient    - Dysphagia   Dysphagia Diet , SLP       - DVt PX Lovenox

## 2018-12-11 NOTE — PROGRESS NOTE ADULT - SUBJECTIVE AND OBJECTIVE BOX
Patient is a 86y old  Female who presents with a chief complaint of CVA right HP , aphasia (11 Dec 2018 10:48)      HPI:  86 year old woman RH dominant with multiple vascular risk factors, including age and HTN was transferred from [facility] to [facility] for acute onset of right-sided weakness. She was noted to have developed acute onset of right-sided weakness and language disturbance. She was initially evaluated at [facility] through tele-stroke and was treated with IV tPA (Admission NIHSS 22). MRI brain showed left CYNDEE and MCA distribution infarct with mild hemorrhagic transformation (HI 1). 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    During hospital stay was neurologically without acute change: remains with global aphasia, right hemiplegia . Permissive hypertension was slowly titrated to normotension. Course was complicated by urinary retention which was managed with PRN straight catheterization. MBS was performed and patient was placed on a Dysphagia 1 honey diet which has been well-tolerated. Lipitor discontinued due to admission LDL of 104. Family meeting to discuss goals of care done on 11/8 2pm, family decided code status DNR/DNI. Discussed and completed MOLST form. Briefly,  pt is DNR/DNI,  TRIAL OF NG TUBE IF BECOMES DYSPHASIC, IV FLUIDS AS NEEDED,  ANTIBIOTICS FOR INFECTION, RETURN TO HOSPITAL, FULL MEDICAL MANAGEMENT SHORT OF INTUBATION AND RESUSCITATION. Cardiac monitoring revealed no events. TTE shows EF 74% no evidence of thrombus or PFO.  Deemed medically stable for acute rehab. (15 Nov 2018 16:23)      PAST MEDICAL & SURGICAL HISTORY:  HTN (hypertension)  Hypertension  S/P appendectomy      MEDICATIONS  (STANDING):  aspirin  chewable 81 milliGRAM(s) Oral daily  baclofen 5 milliGRAM(s) Oral three times a day  bisacodyl 5 milliGRAM(s) Oral at bedtime  docusate sodium 100 milliGRAM(s) Oral three times a day  enoxaparin Injectable 40 milliGRAM(s) SubCutaneous daily  FLUoxetine 20 milliGRAM(s) Oral daily  losartan 100 milliGRAM(s) Oral daily  multivitamin 1 Tablet(s) Oral daily  nystatin    Suspension 559726 Unit(s) Oral three times a day  polyethylene glycol 3350 17 Gram(s) Oral daily  senna 2 Tablet(s) Oral at bedtime    MEDICATIONS  (PRN):  acetaminophen   Tablet .. 650 milliGRAM(s) Oral every 6 hours PRN Moderate Pain (4 - 6)  bisacodyl Suppository 10 milliGRAM(s) Rectal daily PRN Constipation  naproxen 250 milliGRAM(s) Oral two times a day PRN Mild Pain (1 - 3)  phenol 1.4% (CHLORASEPTIC) Oral Spray 1 Spray(s) Topical every 4 hours PRN sore throat pain      Allergies    No Known Allergies    Intolerances        TODAY'S SUBJECTIVE & REVIEW OF SYMPTOMS:    Patient stable. sitting in wheelchair, still severely globally aphasic and apraxic. Has been working on standing transfers, car transfers and shower transfers/ADL with family and staff. Had some loose stool per nursing    PHYSICAL EXAM  86y  Vital Signs Last 24 Hrs  T(C): 36.8 (11 Dec 2018 08:45), Max: 36.9 (10 Dec 2018 22:08)  T(F): 98.2 (11 Dec 2018 08:45), Max: 98.4 (10 Dec 2018 22:08)  HR: 86 (11 Dec 2018 08:45) (78 - 86)  BP: 154/92 (11 Dec 2018 08:45) (147/72 - 154/92)  BP(mean): --  RR: 14 (11 Dec 2018 08:45) (14 - 14)  SpO2: 99% (11 Dec 2018 08:45) (99% - 99%)  Daily     Daily     General: alert, perseverative, apraxic hand movements and difficulty using communication devices/apps for aphasia    Resp: clear bilaterally  Cardio: HR 86, regular  Abdomen: soft +BS NT  Extremities: flexor tone/spasticity RUe and LE  less grimacing with PROM although can still guard on end range knee extension and shoulder PROM  no calf swelling +soft, NT        RECENT LABS:                          10.7   6.3   )-----------( 363      ( 09 Dec 2018 12:37 )             32.2     12-10    136  |  102  |  14  ----------------------------<  111<H>  3.9   |  25  |  0.72    Ca    9.7      10 Dec 2018 07:00    TPro  6.6  /  Alb  3.5  /  TBili  0.7  /  DBili  x   /  AST  30  /  ALT  59<H>  /  AlkPhos  81  12-10    LIVER FUNCTIONS - ( 10 Dec 2018 07:00 )  Alb: 3.5 g/dL / Pro: 6.6 g/dL / ALK PHOS: 81 U/L / ALT: 59 U/L DA / AST: 30 U/L / GGT: x                   CAPILLARY BLOOD GLUCOSE        IMPRESSION AND PLAN:

## 2018-12-11 NOTE — CHART NOTE - NSCHARTNOTEFT_GEN_A_CORE
Nutrition Follow Up   Hospital Course (Per Electronic Medical Record):   Source: Medical Record [X] Patient [ ] Family [ ]         Diet: Low Sodium Mechanical Soft (Dysphagia 2) Diet w/ Honey Thick Liquids   Tolerates Diet Well  No Chewing/Swallowing Difficulties (Per Obeservation)  Intake Varies @Meals (as Per Documentation)     Enteral/Parenteral Nutrition: N/A    Current Weight: 136.6lb on 11/29  % Weight Change: N/A  Obtain New Weight to Confirm  Obtain Weights Weekly     Pertinent Medications: MEDICATIONS  (STANDING):  aspirin  chewable 81 milliGRAM(s) Oral daily  baclofen 5 milliGRAM(s) Oral three times a day  docusate sodium 100 milliGRAM(s) Oral three times a day  enoxaparin Injectable 40 milliGRAM(s) SubCutaneous daily  FLUoxetine 20 milliGRAM(s) Oral daily  losartan 100 milliGRAM(s) Oral daily  multivitamin 1 Tablet(s) Oral daily    MEDICATIONS  (PRN):  naproxen 250 milliGRAM(s) Oral two times a day PRN Mild Pain (1 - 3)    Pertinent Labs:  12-10 Na136 mmol/L Glu 111 mg/dL<H> K+ 3.9 mmol/L Cr  0.72 mg/dL BUN 14 mg/dL 12-10 Alb 3.5 g/dL 11-16 PAB 18 mg/dL<L>    Skin: No Pressure Ulcers     Edema: None Noted     Last BM: 12/11    Estimated Needs:   [X] No Change since Previous Assessment    Previous Nutrition Diagnosis:   Chewing/Swallowing Difficulties     Nutrition Diagnosis is [X] Ongoing - on Mechanical Soft (Dysphagia 2) Diet w/ Honey Thick Liquids        New Nutrition Diagnosis: [X] Not Applicable    Interventions:   1. Recommend Continue Nutrition Plan of Care     Monitoring & Evaluation:   [X] PO Intake   [X] Tolerance to Diet Prescription   [X] Weights   [X] Follow Up (Per Protocol)  [X] Other: Labs     RD Remains Available.  Parviz Hayden RD

## 2018-12-11 NOTE — PROGRESS NOTE ADULT - ASSESSMENT
ASSESSMENT/PLAN  86 year-old woman with a PMH of HTN found to have left ICA (MCA + CYNDEE) stroke s/p tPA  with dense right HP, global aphasia, dysphagia, hemisensory deficits    # Left ICA stroke unknown etiology- with global aphasia and functional quadriplegia  -Aspirin, Statin discontinued as stroke not thought to be of atherosclerotic origin and LFTs increasing, cleared by neuro  -continue Fluoxetine  -Continue OT, PT, SLP services. Will recommend on dc  - - naproxen 12/1 added for pain  -continue splinting, stretch. continue balcofen increased back to TID 12/7 for spasticity. LFTs currently stable, monitor as outpateint    #Cardiovascular  - no PFO on ECHO, EF 74%, HTN  -Losartan.    #GI-  -dc tylenol with start of baclofen.   -dc senna due to loose stool    #Elevated LFTs  -LFTs improved.  Tylenol discontinued  F/U labs today    #Anemia  -H/H stable. f/u as outpatient    -# DIET  upgraded to mechanical soft and honey thickened liquids    #ecchymoses right chest wall, no known trauma  -improving no fracture on Xray, discussed with family      #Code Status: As per meeting with Palliative Team at [facility]  pt is DNR/DNI,  TRIAL OF NG TUBE IF BECOMES DYSPHASIC, IV FLUIDS AS NEEDED,  ANTIBIOTICS FOR INFECTION, RETURN TO HOSPITAL, FULL MEDICAL MANAGEMENT SHORT OF INTUBATION AND RESUSCITATION    # Case discussed in IDT rounds 12/11  continue caregiver training especially car transfers, will also offer options for ambulette  -will discontinue non essential medications in preparation for dc as patient is self-pay  -will give family option for PCP follow up in community  -scheduled for dc 12/12 if all equipment and medical follow up in place        LABS  LFts thurs 12/13 if patient not dc otherwise f/u as outpatient

## 2018-12-12 ENCOUNTER — APPOINTMENT (OUTPATIENT)
Dept: FAMILY MEDICINE | Facility: HOSPITAL | Age: 83
End: 2018-12-12

## 2018-12-12 ENCOUNTER — MED ADMIN CHARGE (OUTPATIENT)
Age: 83
End: 2018-12-12

## 2018-12-12 ENCOUNTER — TRANSCRIPTION ENCOUNTER (OUTPATIENT)
Age: 83
End: 2018-12-12

## 2018-12-12 ENCOUNTER — OUTPATIENT (OUTPATIENT)
Dept: OUTPATIENT SERVICES | Facility: HOSPITAL | Age: 83
LOS: 1 days | End: 2018-12-12
Payer: SELF-PAY

## 2018-12-12 VITALS
RESPIRATION RATE: 14 BRPM | OXYGEN SATURATION: 98 % | DIASTOLIC BLOOD PRESSURE: 73 MMHG | TEMPERATURE: 98.1 F | SYSTOLIC BLOOD PRESSURE: 142 MMHG | HEART RATE: 96 BPM

## 2018-12-12 VITALS
SYSTOLIC BLOOD PRESSURE: 150 MMHG | DIASTOLIC BLOOD PRESSURE: 82 MMHG | HEART RATE: 88 BPM | OXYGEN SATURATION: 98 % | RESPIRATION RATE: 14 BRPM

## 2018-12-12 DIAGNOSIS — Z00.00 ENCOUNTER FOR GENERAL ADULT MEDICAL EXAMINATION WITHOUT ABNORMAL FINDINGS: ICD-10-CM

## 2018-12-12 DIAGNOSIS — Z90.49 ACQUIRED ABSENCE OF OTHER SPECIFIED PARTS OF DIGESTIVE TRACT: Chronic | ICD-10-CM

## 2018-12-12 PROCEDURE — 71101 X-RAY EXAM UNILAT RIBS/CHEST: CPT

## 2018-12-12 PROCEDURE — 97530 THERAPEUTIC ACTIVITIES: CPT

## 2018-12-12 PROCEDURE — 85027 COMPLETE CBC AUTOMATED: CPT

## 2018-12-12 PROCEDURE — G0463: CPT

## 2018-12-12 PROCEDURE — 80048 BASIC METABOLIC PNL TOTAL CA: CPT

## 2018-12-12 PROCEDURE — 97535 SELF CARE MNGMENT TRAINING: CPT

## 2018-12-12 PROCEDURE — 92507 TX SP LANG VOICE COMM INDIV: CPT

## 2018-12-12 PROCEDURE — 97116 GAIT TRAINING THERAPY: CPT

## 2018-12-12 PROCEDURE — 92526 ORAL FUNCTION THERAPY: CPT

## 2018-12-12 PROCEDURE — 92523 SPEECH SOUND LANG COMPREHEN: CPT

## 2018-12-12 PROCEDURE — 74018 RADEX ABDOMEN 1 VIEW: CPT

## 2018-12-12 PROCEDURE — 81001 URINALYSIS AUTO W/SCOPE: CPT

## 2018-12-12 PROCEDURE — 99239 HOSP IP/OBS DSCHRG MGMT >30: CPT

## 2018-12-12 PROCEDURE — 97760 ORTHOTIC MGMT&TRAING 1ST ENC: CPT

## 2018-12-12 PROCEDURE — 84443 ASSAY THYROID STIM HORMONE: CPT

## 2018-12-12 PROCEDURE — 82607 VITAMIN B-12: CPT

## 2018-12-12 PROCEDURE — 87186 SC STD MICRODIL/AGAR DIL: CPT

## 2018-12-12 PROCEDURE — 99233 SBSQ HOSP IP/OBS HIGH 50: CPT

## 2018-12-12 PROCEDURE — 93970 EXTREMITY STUDY: CPT | Mod: 26

## 2018-12-12 PROCEDURE — 97167 OT EVAL HIGH COMPLEX 60 MIN: CPT

## 2018-12-12 PROCEDURE — 97110 THERAPEUTIC EXERCISES: CPT

## 2018-12-12 PROCEDURE — 80076 HEPATIC FUNCTION PANEL: CPT

## 2018-12-12 PROCEDURE — 93970 EXTREMITY STUDY: CPT

## 2018-12-12 PROCEDURE — 92610 EVALUATE SWALLOWING FUNCTION: CPT

## 2018-12-12 PROCEDURE — 87086 URINE CULTURE/COLONY COUNT: CPT

## 2018-12-12 PROCEDURE — 80053 COMPREHEN METABOLIC PANEL: CPT

## 2018-12-12 PROCEDURE — 84134 ASSAY OF PREALBUMIN: CPT

## 2018-12-12 PROCEDURE — 97112 NEUROMUSCULAR REEDUCATION: CPT

## 2018-12-12 PROCEDURE — 97163 PT EVAL HIGH COMPLEX 45 MIN: CPT

## 2018-12-12 RX ORDER — FLUOXETINE HCL 10 MG
1 CAPSULE ORAL
Qty: 30 | Refills: 0 | OUTPATIENT
Start: 2018-12-12

## 2018-12-12 RX ORDER — LOSARTAN POTASSIUM 100 MG/1
1 TABLET, FILM COATED ORAL
Qty: 30 | Refills: 0 | OUTPATIENT
Start: 2018-12-12

## 2018-12-12 RX ORDER — BACLOFEN 100 %
1 POWDER (GRAM) MISCELLANEOUS
Qty: 90 | Refills: 0 | OUTPATIENT
Start: 2018-12-12 | End: 2019-01-10

## 2018-12-12 RX ORDER — ASPIRIN/CALCIUM CARB/MAGNESIUM 324 MG
1 TABLET ORAL
Qty: 30 | Refills: 0 | OUTPATIENT
Start: 2018-12-12

## 2018-12-12 RX ORDER — FLUOXETINE HCL 10 MG
1 CAPSULE ORAL
Qty: 0 | Refills: 0 | COMMUNITY
Start: 2018-12-12

## 2018-12-12 RX ORDER — ASPIRIN/CALCIUM CARB/MAGNESIUM 324 MG
1 TABLET ORAL
Qty: 0 | Refills: 0 | COMMUNITY
Start: 2018-12-12

## 2018-12-12 RX ORDER — LOSARTAN POTASSIUM 100 MG/1
1 TABLET, FILM COATED ORAL
Qty: 0 | Refills: 0 | COMMUNITY
Start: 2018-12-12

## 2018-12-12 RX ORDER — BACLOFEN 100 %
1 POWDER (GRAM) MISCELLANEOUS
Qty: 0 | Refills: 0 | COMMUNITY
Start: 2018-12-12

## 2018-12-12 RX ADMIN — Medication 250 MILLIGRAM(S): at 08:32

## 2018-12-12 RX ADMIN — LOSARTAN POTASSIUM 100 MILLIGRAM(S): 100 TABLET, FILM COATED ORAL at 05:48

## 2018-12-12 RX ADMIN — Medication 250 MILLIGRAM(S): at 07:31

## 2018-12-12 RX ADMIN — Medication 1 TABLET(S): at 11:18

## 2018-12-12 RX ADMIN — Medication 20 MILLIGRAM(S): at 11:18

## 2018-12-12 RX ADMIN — Medication 81 MILLIGRAM(S): at 11:07

## 2018-12-12 RX ADMIN — Medication 100 MILLIGRAM(S): at 05:48

## 2018-12-12 RX ADMIN — Medication 100 MILLIGRAM(S): at 13:50

## 2018-12-12 RX ADMIN — Medication 5 MILLIGRAM(S): at 05:48

## 2018-12-12 RX ADMIN — Medication 5 MILLIGRAM(S): at 13:49

## 2018-12-12 RX ADMIN — ENOXAPARIN SODIUM 40 MILLIGRAM(S): 100 INJECTION SUBCUTANEOUS at 11:18

## 2018-12-12 RX ADMIN — Medication 250 MILLIGRAM(S): at 08:30

## 2018-12-12 NOTE — DISCHARGE NOTE ADULT - DURABLE MEDICAL EQUIPMENT AGENCY
N/A  Commode provided by \[facility]\"", N/C Commode provided by hospital, N/C. (3) local names provided to rent hospital bed: [employer], [facility], both in [place], [place] and [facility] in [place], [place]

## 2018-12-12 NOTE — DISCHARGE NOTE ADULT - PLAN OF CARE
maximize function and prevent recurrence -continue aspirin  -continue baclofen for spasticity, proper positioning right arm and leg, and stretch. monitor liver function  -manage BP  -follow with PCP 1-2 weeks on discharge  -follow with neurology 1-2 weeks on discharge  -follow with PM+R 4 weeks on discharge -continue losartan  -PCP follow up on discharge

## 2018-12-12 NOTE — PLAN
[FreeTextEntry1] : 86 year old female as above \par \par #stroke \par - s/p d/c from rehab at  today \par - d/c from rehab on aspirin, baclofen (decreased dose to bid), naproxen (d/c medication). fluoxetine, losartan\par - pt ot speech therapy referral\par - barium swallow -> wants to follow up speech and swallow \par \par #increasing mcv\par - check b12 folate and tsh\par \par #pain in lower extremity right side \par - most likely secondary to phantom pain dvt study from earlier today negative \par \par #need for pneumonia and tdap vaccine\par - given \par \par had flu shot in [facility] prior to d/c one month prior \par \par rtc 1 month

## 2018-12-12 NOTE — PROGRESS NOTE ADULT - ASSESSMENT
86 year-old woman with a PMH of HTN found to have left ICA (MCA + CYNDEE) stroke s/p tPA  with dense right HP, global aphasia, dysphagia, hemisensory deficits    # Left ICA stroke unknown etiology- Stable.  - no PFO on ECHO, EF 74%  - c/w ASA     # Global aphasia and functional quadriplegia with ADL impairment   -Continue OT, PT, SLP    # Pain in Right Calf - no swelling , currently asymptomatic.   f/u Doppler. , on DVT px with Lovenox     #HTN - controlled   -c/w Losartan.  - BP goal 130/80       # Transaminasemia - resolving   -LFTs improved.    - Tylenol discontinued    - Statin discontinued.   - F/u LFT     #Anemia  -H/H stable. f/u as outpatient    # Dysphagia   Dysphagia Diet , SLP       - DVt PX Lovenox

## 2018-12-12 NOTE — DISCHARGE NOTE ADULT - CARE PROVIDER_API CALL
Bull Glasgow), Neurology; Vascular Neurology  [address]  [address] [place] [address]  Phone: [phone]  Fax: [phone]    Natali Gray), Brain Injury Medicine; PhysicalRehab Medicine  [address] [place] [address]  Phone: [phone]  Fax: [phone]    Family Practice, [place] [address] [place] [address]  Phone: (   )    -  Fax: (   )    -

## 2018-12-12 NOTE — PROGRESS NOTE ADULT - PROVIDER SPECIALTY LIST ADULT
Hospitalist
Physiatry
Rehab Medicine
Physiatry
Rehab Medicine
Hospitalist
Physiatry

## 2018-12-12 NOTE — PROGRESS NOTE ADULT - ASSESSMENT
ASSESSMENT/PLAN  86 year-old woman with a PMH of HTN found to have left ICA (MCA + CYNDEE) stroke s/p tPA  with dense right HP, global aphasia, dysphagia, hemisensory deficits    # Left ICA stroke unknown etiology- with global aphasia and functional quadriplegia  -Aspirin, Statin discontinued as stroke not thought to be of atherosclerotic origin and LFTs increasing, cleared by neuro  -continue Fluoxetine  -Continue OT, PT, SLP services  -continue splinting, stretch. continue balcofen , monitor LFTs as outpateint    #Cardiovascular  - no PFO on ECHO, EF 74%, HTN  -Losartan.    #GI-  -dc tylenol with start of baclofen.   -dc senna due to loose stool    #Elevated LFTs  -LFTs improved off statin. dsicussed with family    #Anemia  -H/H stable. f/u as outpatient    -# DIET  upgraded to mechanical soft and honey thickened liquids        #Code Status: As per meeting with Palliative Team at [facility]  pt is DNR/DNI,  TRIAL OF NG TUBE IF BECOMES DYSPHASIC, IV FLUIDS AS NEEDED,  ANTIBIOTICS FOR INFECTION, RETURN TO HOSPITAL, FULL MEDICAL MANAGEMENT SHORT OF INTUBATION AND RESUSCITATION    # LEFT calf pain  doppler ordered r/o DVT  -HOLD discharge pending results of doppler, discussed in detail with jemima fraga family 12/12    # Case discussed in IDT rounds 12/11  continue caregiver training especially car transfers, will also offer options for ambulette  -will discontinue non essential medications in preparation for dc as patient is self-pay  -will give family option for PCP follow up in community  -scheduled for dc 12/12 if all equipment and medical follow up in place  family practice appointment on discharge for f/u in community  -medications reviewed with family today        LABS  LFts thurs 12/13 if patient not dc otherwise f/u as outpatient

## 2018-12-12 NOTE — CHART NOTE - NSCHARTNOTEFT_GEN_A_CORE
Nursing called regarding patient expressing pain in the calf. Patient is aphasic, on exam patient without swelling and without significant pain with palpation of the calf, however due to recent hospitalization and stroke with component of aphasia will order routine lower extremity dopplers to rule out blood clot etiology.

## 2018-12-12 NOTE — HISTORY OF PRESENT ILLNESS
[FreeTextEntry1] : CC: follow up hospital discharge [de-identified] : 86 year old female with pmh of htn and stroke coming to clinic for follow up. patient does not speak much however accompanied by family members who provided history. patient unable to move right side of body much. patient stating to family having rle pain. states stroke occurred one month prior was at [facility] then transferred to [facility] for rehab.

## 2018-12-12 NOTE — PHYSICAL EXAM
[No JVD] : no jugular venous distention [Supple] : supple [No Lymphadenopathy] : no lymphadenopathy [Clear to Auscultation] : lungs were clear to auscultation bilaterally [No Accessory Muscle Use] : no accessory muscle use [Normal S1, S2] : normal S1 and S2 [No Edema] : there was no peripheral edema [Soft] : abdomen soft [Non Tender] : non-tender [Non-distended] : non-distended [Normal Bowel Sounds] : normal bowel sounds [Normal Posterior Cervical Nodes] : no posterior cervical lymphadenopathy [Normal Anterior Cervical Nodes] : no anterior cervical lymphadenopathy [de-identified] : wheelchair bound right sided weakness [de-identified] : 1/5 strength and motor on right side; 4/5 strength and motor on left side

## 2018-12-12 NOTE — PROGRESS NOTE ADULT - SUBJECTIVE AND OBJECTIVE BOX
Patient is a 86y old  Female who presents with a chief complaint of CVA right HP , aphasia (11 Dec 2018 11:45)      Patient seen and examined at bedside. Patient is aphasic, cant express herself. Looks comfortable.     ALLERGIES:  No Known Allergies    MEDICATIONS:  baclofen 5 milliGRAM(s) Oral three times a day  docusate sodium 100 milliGRAM(s) Oral three times a day  multivitamin 1 Tablet(s) Oral daily  naproxen 250 milliGRAM(s) Oral two times a day PRN    Vital Signs Last 24 Hrs  T(F): 98.6 (12 Dec 2018 05:47), Max: 98.6 (12 Dec 2018 05:47)  HR: 88 (12 Dec 2018 08:44) (88 - 91)  BP: 150/82 (12 Dec 2018 08:44) (150/82 - 161/77)  RR: 14 (12 Dec 2018 08:44) (14 - 15)  SpO2: 98% (12 Dec 2018 08:44) (98% - 98%)  I&O's Summary      PHYSICAL EXAM:  General: NAD  Neck: Supple, No JVD  Lungs: Clear to auscultation bilaterally. no added sounds.   Cardio: RRR, S1/S2, No murmurs  Abdomen: Soft, NT/ND, BS+   Extremities: No edema   CNS : Aphasic     LABS:                        10.7   6.3   )-----------( 363      ( 09 Dec 2018 12:37 )             32.2     12-10    136  |  102  |  14  ----------------------------<  111  3.9   |  25  |  0.72    Ca    9.7      10 Dec 2018 07:00    TPro  6.6  /  Alb  3.5  /  TBili  0.7  /  DBili  x   /  AST  30  /  ALT  59  /  AlkPhos  81  12-10    eGFR if Non African American: 76 mL/min/1.73M2 (12-10-18 @ 07:00)  eGFR if : 88 mL/min/1.73M2 (12-10-18 @ 07:00)            11-03 Chol 169 mg/dL  mg/dL HDL 54 mg/dL Trig 57 mg/dL        CAPILLARY BLOOD GLUCOSE        11-03 [id number] 5.4          RADIOLOGY & ADDITIONAL TESTS:    Care Discussed with Consultants/Other Providers:

## 2018-12-12 NOTE — DISCHARGE NOTE ADULT - CARE PLAN
Principal Discharge DX:	Cerebrovascular accident (CVA), unspecified mechanism  Goal:	maximize function and prevent recurrence  Assessment and plan of treatment:	-continue aspirin  -continue baclofen for spasticity, proper positioning right arm and leg, and stretch. monitor liver function  -manage BP  -follow with PCP 1-2 weeks on discharge  -follow with neurology 1-2 weeks on discharge  -follow with PM+R 4 weeks on discharge  Secondary Diagnosis:	Hypertension, unspecified type  Assessment and plan of treatment:	-continue losartan  -PCP follow up on discharge

## 2018-12-12 NOTE — DISCHARGE NOTE ADULT - PATIENT PORTAL LINK FT
You can access the [facility] Patient Portal, offered by [facility], by registering with the following website: [email or web address]

## 2018-12-12 NOTE — PROGRESS NOTE ADULT - REASON FOR ADMISSION
CVA right HP , aphasia
CVA
CVA right HP , aphasia
CVA right HP , aphasia fu
CVA right HP , aphasia
CVA right HP , aphasia
CVA right HP , aphasia fu

## 2018-12-12 NOTE — DISCHARGE NOTE ADULT - PROVIDER TOKENS
TOKEN:'[id number]',TOKEN:'[id number]',FREE:[LAST:[Family Practice],PHONE:[(   )    -],FAX:[(   )    -],ADDRESS:[[facility] [place] [address] [place] [address]]]

## 2018-12-12 NOTE — DISCHARGE NOTE ADULT - HOSPITAL COURSE
86 year old woman RH dominant with multiple vascular risk factors, including age and HTN was transferred from [facility] to [facility] for acute onset of right-sided weakness. She was noted to have developed acute onset of right-sided weakness and language disturbance. She was initially evaluated at [facility] through tele-stroke and was treated with IV tPA (Admission NIHSS 22). MRI brain showed left CYNDEE and MCA distribution infarct with mild hemorrhagic transformation (HI 1). 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    During hospital stay was neurologically without acute change: remains with global aphasia, right hemiplegia . Permissive hypertension was slowly titrated to normotension. Course was complicated by urinary retention which was managed with PRN straight catheterization. MBS was performed and patient was placed on a Dysphagia 1 honey diet which has been well-tolerated. Lipitor discontinued due to admission LDL of 104. She was transferred to [facility] for acute rehab services    Patient had severe global aphasia; several attempts were made to use communication board and abhijit, however due to apraxia, it ws not always reliable, even for very simple personal information. She had minimal return in her right lower extremity, but started developing hypertonicity of her RUE and LE. She was previously restarted on statin, however , developed increasing LFTs, so statin was discontinued, cleared by neurology. She was placed on baclofen, LFTs monitored and stable, to address her hypertonicity. Splinting was also provided. A doppler was performed due to onset of left calf pain on the day of admission with benign physical exam, which was negative for DVT. She is discharge with 24 hour caregiver support and home care referral for PT, OT and SLP services

## 2018-12-12 NOTE — PROGRESS NOTE ADULT - SUBJECTIVE AND OBJECTIVE BOX
Patient is a 86y old  Female who presents with a chief complaint of CVA right HP , aphasia (12 Dec 2018 10:11)      HPI:  86 year old woman RH dominant with multiple vascular risk factors, including age and HTN was transferred from [facility] to [facility] for acute onset of right-sided weakness. She was noted to have developed acute onset of right-sided weakness and language disturbance. She was initially evaluated at [facility] through tele-stroke and was treated with IV tPA (Admission NIHSS 22). MRI brain showed left CYNDEE and MCA distribution infarct with mild hemorrhagic transformation (HI 1). 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    During hospital stay was neurologically without acute change: remains with global aphasia, right hemiplegia . Permissive hypertension was slowly titrated to normotension. Course was complicated by urinary retention which was managed with PRN straight catheterization. MBS was performed and patient was placed on a Dysphagia 1 honey diet which has been well-tolerated. Lipitor discontinued due to admission LDL of 104. Family meeting to discuss goals of care done on 11/8 2pm, family decided code status DNR/DNI. Discussed and completed MOLST form. Briefly,  pt is DNR/DNI,  TRIAL OF NG TUBE IF BECOMES DYSPHASIC, IV FLUIDS AS NEEDED,  ANTIBIOTICS FOR INFECTION, RETURN TO HOSPITAL, FULL MEDICAL MANAGEMENT SHORT OF INTUBATION AND RESUSCITATION. Cardiac monitoring revealed no events. TTE shows EF 74% no evidence of thrombus or PFO.  Deemed medically stable for acute rehab. (15 Nov 2018 16:23)      PAST MEDICAL & SURGICAL HISTORY:  HTN (hypertension)  Hypertension  S/P appendectomy      MEDICATIONS  (STANDING):  aspirin  chewable 81 milliGRAM(s) Oral daily  baclofen 5 milliGRAM(s) Oral three times a day  docusate sodium 100 milliGRAM(s) Oral three times a day  enoxaparin Injectable 40 milliGRAM(s) SubCutaneous daily  FLUoxetine 20 milliGRAM(s) Oral daily  losartan 100 milliGRAM(s) Oral daily  multivitamin 1 Tablet(s) Oral daily    MEDICATIONS  (PRN):  naproxen 250 milliGRAM(s) Oral two times a day PRN Mild Pain (1 - 3)      Allergies    No Known Allergies    Intolerances        TODAY'S SUBJECTIVE & REVIEW OF SYMPTOMS:    Patient seen with assistance language line  and family at bedside Niki hairston #[id number]. Patient sittingin chair, last night reported some discomfort in calf (LEFT) which she repeats today    PHYSICAL EXAM  86y  Vital Signs Last 24 Hrs  T(C): 37 (12 Dec 2018 05:47), Max: 37 (12 Dec 2018 05:47)  T(F): 98.6 (12 Dec 2018 05:47), Max: 98.6 (12 Dec 2018 05:47)  HR: 88 (12 Dec 2018 08:44) (88 - 91)  BP: 150/82 (12 Dec 2018 08:44) (150/82 - 161/77)  BP(mean): --  RR: 14 (12 Dec 2018 08:44) (14 - 15)  SpO2: 98% (12 Dec 2018 08:44) (98% - 98%)  Daily     Daily     General: alert sitting in chair, still apraxic and difficulty with even simpleyes/no, but does better with family present    Resp: clear bilaterally no R/r/W good effort  Cardio:No S1 and S2 regular HR 88  Abdomen: +BS soft, ND NT  Extremities: right LE +hamstring tightness increased tone today  -30 degrees full PROM with sustained stretch, reduced to -20  left calf no swelling no erytehmema or warmth but consistent report discomfort  no ecchymoses  right calf NT left calf reported TTP o cord palpable    RECENT LABS:                      CAPILLARY BLOOD GLUCOSE        IMPRESSION AND PLAN:

## 2018-12-12 NOTE — REVIEW OF SYSTEMS
[Fever] : no fever [Chills] : no chills [Night Sweats] : no night sweats [Discharge] : no discharge [Pain] : no pain [Vision Problems] : no vision problems [Earache] : no earache [Hearing Loss] : no hearing loss [Nasal Discharge] : no nasal discharge [Sore Throat] : no sore throat [Chest Pain] : no chest pain [Palpitations] : no palpitations [Orthopnea] : no orthopnea [Shortness Of Breath] : no shortness of breath [Wheezing] : no wheezing [Cough] : no cough [Abdominal Pain] : no abdominal pain [Nausea] : no nausea [Vomiting] : no vomiting [Joint Pain] : no joint pain [Headache] : no headache

## 2018-12-12 NOTE — DISCHARGE NOTE ADULT - CARE PROVIDERS DIRECT ADDRESSES
[email or web address],DirectAddress_Unknown

## 2018-12-12 NOTE — DISCHARGE NOTE ADULT - MEDICATION SUMMARY - MEDICATIONS TO TAKE
I will START or STAY ON the medications listed below when I get home from the hospital:    naproxen 250 mg oral tablet  -- 1 tab(s) by mouth 2 times a day, As needed, Mild Pain (1 - 3)  -- Indication: For FOR pain. take with food    aspirin 81 mg oral tablet, chewable  -- 1 tab(s) by mouth once a day  -- Indication: For stroke prevention    losartan 100 mg oral tablet  -- 1 tab(s) by mouth once a day  -- Indication: For Hypertension, unspecified type    FLUoxetine 20 mg oral capsule  -- 1 cap(s) by mouth once a day  -- Indication: For depression and motor recovery    baclofen 5 mg oral tablet  -- 1 tab(s) by mouth 3 times a day  -- Indication: For spasticity I will START or STAY ON the medications listed below when I get home from the hospital:    naproxen 250 mg oral tablet  -- 1 tab(s) by mouth 2 times a day, As needed, Mild Pain (1 - 3)-mod  -- Indication: For pain    aspirin 81 mg oral tablet, chewable  -- 1 tab(s) by mouth once a day  -- Indication: For stroke prevention    losartan 100 mg oral tablet  -- 1 tab(s) by mouth once a day  -- Indication: For Hypertension, unspecified type    FLUoxetine 20 mg oral capsule  -- 1 cap(s) by mouth once a day  -- Indication: For mood and motor recovery    baclofen 5 mg oral tablet  -- 1 tab(s) by mouth 3 times a day  -- Indication: For spasticity

## 2018-12-13 LAB
FOLATE SERPL-MCNC: 15.9 NG/ML
TSH SERPL-ACNC: 3.6 UIU/ML
VIT B12 SERPL-MCNC: 775 PG/ML

## 2018-12-17 DIAGNOSIS — I63.9 CEREBRAL INFARCTION, UNSPECIFIED: ICD-10-CM

## 2018-12-17 DIAGNOSIS — I10 ESSENTIAL (PRIMARY) HYPERTENSION: ICD-10-CM

## 2019-01-10 ENCOUNTER — APPOINTMENT (OUTPATIENT)
Dept: FAMILY MEDICINE | Facility: HOSPITAL | Age: 84
End: 2019-01-10

## 2019-01-10 ENCOUNTER — OUTPATIENT (OUTPATIENT)
Dept: OUTPATIENT SERVICES | Facility: HOSPITAL | Age: 84
LOS: 1 days | End: 2019-01-10
Payer: SELF-PAY

## 2019-01-10 VITALS
DIASTOLIC BLOOD PRESSURE: 72 MMHG | RESPIRATION RATE: 16 BRPM | HEART RATE: 77 BPM | SYSTOLIC BLOOD PRESSURE: 137 MMHG | OXYGEN SATURATION: 100 % | TEMPERATURE: 97.3 F

## 2019-01-10 DIAGNOSIS — Z00.00 ENCOUNTER FOR GENERAL ADULT MEDICAL EXAMINATION WITHOUT ABNORMAL FINDINGS: ICD-10-CM

## 2019-01-10 DIAGNOSIS — Z90.49 ACQUIRED ABSENCE OF OTHER SPECIFIED PARTS OF DIGESTIVE TRACT: Chronic | ICD-10-CM

## 2019-01-10 DIAGNOSIS — Z23 ENCOUNTER FOR IMMUNIZATION: ICD-10-CM

## 2019-01-10 PROCEDURE — G0463: CPT

## 2019-01-10 NOTE — HISTORY OF PRESENT ILLNESS
[FreeTextEntry1] : folow up cva [de-identified] : patient is a 86 year old female coming to clinic for follow up. patient accompanied by daughters and grand daughters who she would like to translate. patient does not talk much. patient able to move left side of body without difficulty. contractions noted on right side. patient stating to family has pain with movement on right side if resting for to long,. patient coming to clinic in wheelchair. helps right side move by using left side. states has been having home care come to home. no suicidal or homocidal ideations

## 2019-01-10 NOTE — COUNSELING
[Weight management counseling provided] : Weight management [Healthy eating counseling provided] : healthy eating [Activity counseling provided] : activity [Low Fat Diet] : Low fat diet [Decrease Portions] : Decrease food portions [___ min/wk activity recommended] : [unfilled] min/wk activity recommended

## 2019-01-10 NOTE — REVIEW OF SYSTEMS
[Depression] : depression [Fever] : no fever [Chills] : no chills [Night Sweats] : no night sweats [Discharge] : no discharge [Pain] : no pain [Vision Problems] : no vision problems [Earache] : no earache [Hearing Loss] : no hearing loss [Nasal Discharge] : no nasal discharge [Sore Throat] : no sore throat [Chest Pain] : no chest pain [Palpitations] : no palpitations [Orthopnea] : no orthopnea [Shortness Of Breath] : no shortness of breath [Wheezing] : no wheezing [Cough] : no cough [Abdominal Pain] : no abdominal pain [Nausea] : no nausea [Vomiting] : no vomiting [Joint Pain] : no joint pain [Headache] : no headache [Suicidal] : not suicidal

## 2019-01-10 NOTE — PLAN
[FreeTextEntry1] : 86 year old female as above \par \par #stroke \par - s/p d/c from rehab at  \par - home care \par - continue aspirin\par - baclofen decreased to qd\par - losartan\par - pt ot speech therapy referral\par - barium swallow -> wants to follow up speech and swallow \par \par #depression\par - stable \par - continue fluoxetine\par \par rtc 1 month

## 2019-01-10 NOTE — PHYSICAL EXAM
[Well Developed] : well developed [No JVD] : no jugular venous distention [Supple] : supple [No Lymphadenopathy] : no lymphadenopathy [Clear to Auscultation] : lungs were clear to auscultation bilaterally [No Accessory Muscle Use] : no accessory muscle use [Normal S1, S2] : normal S1 and S2 [No Edema] : there was no peripheral edema [Soft] : abdomen soft [Non Tender] : non-tender [Non-distended] : non-distended [Normal Bowel Sounds] : normal bowel sounds [Normal Posterior Cervical Nodes] : no posterior cervical lymphadenopathy [Normal Anterior Cervical Nodes] : no anterior cervical lymphadenopathy [No Spinal Tenderness] : no spinal tenderness [de-identified] : wheelchair bound right sided weakness [de-identified] : 1/5 strength and motor on right side; 4/5 strength and motor on left side

## 2019-01-14 DIAGNOSIS — I63.9 CEREBRAL INFARCTION, UNSPECIFIED: ICD-10-CM

## 2019-02-19 ENCOUNTER — RX RENEWAL (OUTPATIENT)
Age: 84
End: 2019-02-19

## 2019-02-26 ENCOUNTER — EMERGENCY (EMERGENCY)
Facility: HOSPITAL | Age: 84
LOS: 1 days | Discharge: HOME | End: 2019-02-26
Attending: EMERGENCY MEDICINE
Payer: MEDICAID

## 2019-02-26 VITALS
SYSTOLIC BLOOD PRESSURE: 129 MMHG | DIASTOLIC BLOOD PRESSURE: 74 MMHG | RESPIRATION RATE: 20 BRPM | OXYGEN SATURATION: 98 % | TEMPERATURE: 98 F | HEART RATE: 70 BPM

## 2019-02-26 DIAGNOSIS — Z90.49 ACQUIRED ABSENCE OF OTHER SPECIFIED PARTS OF DIGESTIVE TRACT: Chronic | ICD-10-CM

## 2019-02-26 LAB
ALBUMIN SERPL ELPH-MCNC: 3.8 G/DL — SIGNIFICANT CHANGE UP (ref 3.3–5.2)
ALP SERPL-CCNC: 84 U/L — SIGNIFICANT CHANGE UP (ref 40–120)
ALT FLD-CCNC: 26 U/L — SIGNIFICANT CHANGE UP
ANION GAP SERPL CALC-SCNC: 11 MMOL/L — SIGNIFICANT CHANGE UP (ref 5–17)
APPEARANCE UR: CLEAR — SIGNIFICANT CHANGE UP
AST SERPL-CCNC: 19 U/L — SIGNIFICANT CHANGE UP
BACTERIA # UR AUTO: ABNORMAL
BASOPHILS # BLD AUTO: 0 K/UL — SIGNIFICANT CHANGE UP (ref 0–0.2)
BASOPHILS NFR BLD AUTO: 0.2 % — SIGNIFICANT CHANGE UP (ref 0–2)
BILIRUB SERPL-MCNC: 0.2 MG/DL — LOW (ref 0.4–2)
BILIRUB UR-MCNC: NEGATIVE — SIGNIFICANT CHANGE UP
BUN SERPL-MCNC: 21 MG/DL — HIGH (ref 8–20)
CALCIUM SERPL-MCNC: 10.3 MG/DL — HIGH (ref 8.6–10.2)
CHLORIDE SERPL-SCNC: 104 MMOL/L — SIGNIFICANT CHANGE UP (ref 98–107)
CO2 SERPL-SCNC: 24 MMOL/L — SIGNIFICANT CHANGE UP (ref 22–29)
COLOR SPEC: YELLOW — SIGNIFICANT CHANGE UP
CREAT SERPL-MCNC: 0.62 MG/DL — SIGNIFICANT CHANGE UP (ref 0.5–1.3)
DIFF PNL FLD: NEGATIVE — SIGNIFICANT CHANGE UP
EOSINOPHIL # BLD AUTO: 0 K/UL — SIGNIFICANT CHANGE UP (ref 0–0.5)
EOSINOPHIL NFR BLD AUTO: 0.9 % — SIGNIFICANT CHANGE UP (ref 0–6)
EPI CELLS # UR: SIGNIFICANT CHANGE UP
GLUCOSE SERPL-MCNC: 106 MG/DL — SIGNIFICANT CHANGE UP (ref 70–115)
GLUCOSE UR QL: NEGATIVE MG/DL — SIGNIFICANT CHANGE UP
HCT VFR BLD CALC: 34.1 % — LOW (ref 37–47)
HGB BLD-MCNC: 11.2 G/DL — LOW (ref 12–16)
KETONES UR-MCNC: NEGATIVE — SIGNIFICANT CHANGE UP
LEUKOCYTE ESTERASE UR-ACNC: ABNORMAL
LYMPHOCYTES # BLD AUTO: 1.3 K/UL — SIGNIFICANT CHANGE UP (ref 1–4.8)
LYMPHOCYTES # BLD AUTO: 22.9 % — SIGNIFICANT CHANGE UP (ref 20–55)
MCHC RBC-ENTMCNC: 31.1 PG — HIGH (ref 27–31)
MCHC RBC-ENTMCNC: 32.8 G/DL — SIGNIFICANT CHANGE UP (ref 32–36)
MCV RBC AUTO: 94.7 FL — SIGNIFICANT CHANGE UP (ref 81–99)
MONOCYTES # BLD AUTO: 0.4 K/UL — SIGNIFICANT CHANGE UP (ref 0–0.8)
MONOCYTES NFR BLD AUTO: 8 % — SIGNIFICANT CHANGE UP (ref 3–10)
NEUTROPHILS # BLD AUTO: 3.8 K/UL — SIGNIFICANT CHANGE UP (ref 1.8–8)
NEUTROPHILS NFR BLD AUTO: 67.8 % — SIGNIFICANT CHANGE UP (ref 37–73)
NITRITE UR-MCNC: NEGATIVE — SIGNIFICANT CHANGE UP
PH UR: 7 — SIGNIFICANT CHANGE UP (ref 5–8)
PLATELET # BLD AUTO: 222 K/UL — SIGNIFICANT CHANGE UP (ref 150–400)
POTASSIUM SERPL-MCNC: 3.9 MMOL/L — SIGNIFICANT CHANGE UP (ref 3.5–5.3)
POTASSIUM SERPL-SCNC: 3.9 MMOL/L — SIGNIFICANT CHANGE UP (ref 3.5–5.3)
PROT SERPL-MCNC: 6.8 G/DL — SIGNIFICANT CHANGE UP (ref 6.6–8.7)
PROT UR-MCNC: NEGATIVE MG/DL — SIGNIFICANT CHANGE UP
RBC # BLD: 3.6 M/UL — LOW (ref 4.4–5.2)
RBC # FLD: 13 % — SIGNIFICANT CHANGE UP (ref 11–15.6)
RBC CASTS # UR COMP ASSIST: SIGNIFICANT CHANGE UP /HPF (ref 0–4)
SODIUM SERPL-SCNC: 139 MMOL/L — SIGNIFICANT CHANGE UP (ref 135–145)
SP GR SPEC: 1.01 — SIGNIFICANT CHANGE UP (ref 1.01–1.02)
UROBILINOGEN FLD QL: NEGATIVE MG/DL — SIGNIFICANT CHANGE UP
WBC # BLD: 5.6 K/UL — SIGNIFICANT CHANGE UP (ref 4.8–10.8)
WBC # FLD AUTO: 5.6 K/UL — SIGNIFICANT CHANGE UP (ref 4.8–10.8)
WBC UR QL: ABNORMAL

## 2019-02-26 PROCEDURE — 80053 COMPREHEN METABOLIC PANEL: CPT

## 2019-02-26 PROCEDURE — 99284 EMERGENCY DEPT VISIT MOD MDM: CPT | Mod: 25

## 2019-02-26 PROCEDURE — 96374 THER/PROPH/DIAG INJ IV PUSH: CPT

## 2019-02-26 PROCEDURE — 36415 COLL VENOUS BLD VENIPUNCTURE: CPT

## 2019-02-26 PROCEDURE — 72131 CT LUMBAR SPINE W/O DYE: CPT | Mod: 26

## 2019-02-26 PROCEDURE — 99285 EMERGENCY DEPT VISIT HI MDM: CPT

## 2019-02-26 PROCEDURE — 81001 URINALYSIS AUTO W/SCOPE: CPT

## 2019-02-26 PROCEDURE — 72131 CT LUMBAR SPINE W/O DYE: CPT

## 2019-02-26 PROCEDURE — 85027 COMPLETE CBC AUTOMATED: CPT

## 2019-02-26 RX ORDER — ACETAMINOPHEN 500 MG
975 TABLET ORAL ONCE
Qty: 0 | Refills: 0 | Status: COMPLETED | OUTPATIENT
Start: 2019-02-26 | End: 2019-02-26

## 2019-02-26 RX ORDER — MORPHINE SULFATE 50 MG/1
2 CAPSULE, EXTENDED RELEASE ORAL ONCE
Qty: 0 | Refills: 0 | Status: DISCONTINUED | OUTPATIENT
Start: 2019-02-26 | End: 2019-02-26

## 2019-02-26 RX ADMIN — MORPHINE SULFATE 2 MILLIGRAM(S): 50 CAPSULE, EXTENDED RELEASE ORAL at 21:55

## 2019-02-26 RX ADMIN — MORPHINE SULFATE 2 MILLIGRAM(S): 50 CAPSULE, EXTENDED RELEASE ORAL at 22:57

## 2019-02-26 NOTE — ED ADULT TRIAGE NOTE - CHIEF COMPLAINT QUOTE
Pt BIBA c/o sudden onset back pain.  Family denies fall/injury.  As per family pt is mostly non-verbal at baseline s/p stroke in September 2018.  Pt also has residual right sided weakness from incident.  Pt is not able to ambulate without assistance. Pt is able to follow commands.

## 2019-02-26 NOTE — ED PROVIDER NOTE - CLINICAL SUMMARY MEDICAL DECISION MAKING FREE TEXT BOX
An 86 year old female pt presents to the ED c/o back pain. Will check labs, treat pain, CT back, and re-evaluate.

## 2019-02-26 NOTE — ED PROVIDER NOTE - CARDIAC, MLM
Normal rate, regular rhythm.  Heart sounds S1, S2.  No murmurs, rubs or gallops. Normal rate, regular rhythm.  2/6 systolic ejection murmur.

## 2019-02-26 NOTE — ED ADULT NURSE NOTE - OBJECTIVE STATEMENT
per family, family was placing pt into bed when pt had sudden onset of back pain. family denies injury to area. pt is mostly non-verbal, AO to self, and can answer yes/no questions. Residual right sided weakness from stroke in september 2018. Pt is wheelchair bound, can follow commands.

## 2019-02-26 NOTE — ED PROVIDER NOTE - OBJECTIVE STATEMENT
An 86 year old female pt with a hx of CVA last year, residual right sided weakness, non-verbal at baseline, presents to the ED c/o back pain. As per the pt's family, the pt has been c/o left lower back pain over the past few days. She has not had any trauma or falls. Pt is non-ambulatory at baseline and is essential bed-to chair. She has been taking Robaxin for pain but no other pain medication. No further complaints at this time. An 86 year old female pt with a hx of CVA last year, residual right sided weakness, non-verbal at baseline, presents to the ED c/o back pain. As per the pt's family, the pt has been c/o left lower back pain over the past few days. She has not had any trauma or falls. Pt is non-ambulatory at baseline and is essentially bed-to chair. She has been taking Robaxin for pain but no other pain medication. No further complaints at this time.

## 2019-02-26 NOTE — ED PROVIDER NOTE - PROGRESS NOTE DETAILS
Labs and CT results as noted.  Pt improved with pain meds.  Rx Percocet 5/325 mg 1/2 tab q4-6h with f/u HRH

## 2019-02-27 VITALS
HEART RATE: 80 BPM | DIASTOLIC BLOOD PRESSURE: 63 MMHG | TEMPERATURE: 98 F | RESPIRATION RATE: 18 BRPM | OXYGEN SATURATION: 99 % | SYSTOLIC BLOOD PRESSURE: 127 MMHG

## 2019-02-27 NOTE — ED ADULT NURSE REASSESSMENT NOTE - NS ED NURSE REASSESS COMMENT FT1
received pt from ongoing shift at this time. pt Kos[place] speaking at baseline mental status per family. pt was medicated prior to assignment and states relief of pain. pt unable to ambulate r/t hx of stroke. voiding via bedpan effortlessly. pending further tx plan. family at bedside. updated on plan of care, verbalize understanding. call bell in reach

## 2019-03-14 PROBLEM — I63.9 CEREBRAL INFARCTION, UNSPECIFIED: Chronic | Status: ACTIVE | Noted: 2019-02-26

## 2019-04-04 ENCOUNTER — OUTPATIENT (OUTPATIENT)
Dept: OUTPATIENT SERVICES | Facility: HOSPITAL | Age: 84
LOS: 1 days | End: 2019-04-04
Payer: MEDICAID

## 2019-04-04 DIAGNOSIS — Z90.49 ACQUIRED ABSENCE OF OTHER SPECIFIED PARTS OF DIGESTIVE TRACT: Chronic | ICD-10-CM

## 2019-04-04 DIAGNOSIS — R26.89 OTHER ABNORMALITIES OF GAIT AND MOBILITY: ICD-10-CM

## 2019-04-04 DIAGNOSIS — R27.9 UNSPECIFIED LACK OF COORDINATION: ICD-10-CM

## 2019-04-04 DIAGNOSIS — G89.29 OTHER CHRONIC PAIN: ICD-10-CM

## 2019-04-04 DIAGNOSIS — Z51.89 ENCOUNTER FOR OTHER SPECIFIED AFTERCARE: ICD-10-CM

## 2019-04-11 ENCOUNTER — APPOINTMENT (OUTPATIENT)
Dept: INTERNAL MEDICINE | Facility: CLINIC | Age: 84
End: 2019-04-11
Payer: MEDICAID

## 2019-04-11 VITALS — HEART RATE: 80 BPM | RESPIRATION RATE: 12 BRPM | SYSTOLIC BLOOD PRESSURE: 120 MMHG | DIASTOLIC BLOOD PRESSURE: 78 MMHG

## 2019-04-11 VITALS — BODY MASS INDEX: 30.24 KG/M2 | HEIGHT: 59 IN | WEIGHT: 150 LBS

## 2019-04-11 PROCEDURE — 99203 OFFICE O/P NEW LOW 30 MIN: CPT

## 2019-04-11 NOTE — ASSESSMENT
[FreeTextEntry1] : today constipation miralax\par will need OT/PT/SPeech pathology\par continue soft diet\par follow up one month\par needs transportation, needs hha, needs wheelchair with leg lifts\par patient not a [place] citizen

## 2019-04-11 NOTE — HISTORY OF PRESENT ILLNESS
[FreeTextEntry8] : New patient visit.  Patient has history of htn, was visiting from [place] and developed a edward mca stroke\par she has expressive aphasia, right hemiparesis.  She was in [place] but now has medicaid under the care of her daughter and grandson.  Patient is alert understands and has very limited answer one word replies.  SHe has right arm hemiplegia and right leg hemiparesis

## 2019-04-11 NOTE — PHYSICAL EXAM
[Well Nourished] : well nourished [No Acute Distress] : no acute distress [Well Developed] : well developed [Well-Appearing] : well-appearing [Normal Sclera/Conjunctiva] : normal sclera/conjunctiva [EOMI] : extraocular movements intact [PERRL] : pupils equal round and reactive to light [Normal Outer Ear/Nose] : the outer ears and nose were normal in appearance [No JVD] : no jugular venous distention [Normal Oropharynx] : the oropharynx was normal [No Lymphadenopathy] : no lymphadenopathy [Thyroid Normal, No Nodules] : the thyroid was normal and there were no nodules present [Supple] : supple [Clear to Auscultation] : lungs were clear to auscultation bilaterally [No Respiratory Distress] : no respiratory distress  [Normal Rate] : normal rate  [No Accessory Muscle Use] : no accessory muscle use [Regular Rhythm] : with a regular rhythm [No Murmur] : no murmur heard [Normal S1, S2] : normal S1 and S2 [No Abdominal Bruit] : a ~M bruit was not heard ~T in the abdomen [No Carotid Bruits] : no carotid bruits [No Varicosities] : no varicosities [No Edema] : there was no peripheral edema [Pedal Pulses Present] : the pedal pulses are present [No Extremity Clubbing/Cyanosis] : no extremity clubbing/cyanosis [No Palpable Aorta] : no palpable aorta [Soft] : abdomen soft [Non-distended] : non-distended [Non Tender] : non-tender [No HSM] : no HSM [No Masses] : no abdominal mass palpated [Normal Posterior Cervical Nodes] : no posterior cervical lymphadenopathy [Normal Bowel Sounds] : normal bowel sounds [No CVA Tenderness] : no CVA  tenderness [No Spinal Tenderness] : no spinal tenderness [Normal Anterior Cervical Nodes] : no anterior cervical lymphadenopathy [No Rash] : no rash [Deep Tendon Reflexes (DTR)] : deep tendon reflexes were 2+ and symmetric [Normal Affect] : the affect was normal [Normal Insight/Judgement] : insight and judgment were intact [de-identified] : right hemiplegia, right hemiparesis

## 2019-04-29 ENCOUNTER — MEDICATION RENEWAL (OUTPATIENT)
Age: 84
End: 2019-04-29

## 2019-05-03 ENCOUNTER — OTHER (OUTPATIENT)
Age: 84
End: 2019-05-03

## 2019-05-09 ENCOUNTER — APPOINTMENT (OUTPATIENT)
Dept: INTERNAL MEDICINE | Facility: CLINIC | Age: 84
End: 2019-05-09
Payer: MEDICAID

## 2019-05-09 VITALS
HEART RATE: 77 BPM | BODY MASS INDEX: 30.24 KG/M2 | RESPIRATION RATE: 14 BRPM | SYSTOLIC BLOOD PRESSURE: 110 MMHG | DIASTOLIC BLOOD PRESSURE: 78 MMHG | WEIGHT: 150 LBS | HEIGHT: 59 IN

## 2019-05-09 PROCEDURE — 99214 OFFICE O/P EST MOD 30 MIN: CPT

## 2019-05-09 NOTE — ASSESSMENT
[FreeTextEntry1] : continue supportive care\par aspiration risk\par follow up 2months\par will need more  for better insurance

## 2019-05-09 NOTE — PHYSICAL EXAM
[No Acute Distress] : no acute distress [Well Nourished] : well nourished [Well-Appearing] : well-appearing [Well Developed] : well developed [Normal Sclera/Conjunctiva] : normal sclera/conjunctiva [PERRL] : pupils equal round and reactive to light [Normal Outer Ear/Nose] : the outer ears and nose were normal in appearance [EOMI] : extraocular movements intact [Normal Oropharynx] : the oropharynx was normal [No Lymphadenopathy] : no lymphadenopathy [No JVD] : no jugular venous distention [Supple] : supple [Thyroid Normal, No Nodules] : the thyroid was normal and there were no nodules present [No Accessory Muscle Use] : no accessory muscle use [No Respiratory Distress] : no respiratory distress  [Clear to Auscultation] : lungs were clear to auscultation bilaterally [Regular Rhythm] : with a regular rhythm [Normal Rate] : normal rate  [No Murmur] : no murmur heard [Normal S1, S2] : normal S1 and S2 [No Carotid Bruits] : no carotid bruits [No Abdominal Bruit] : a ~M bruit was not heard ~T in the abdomen [No Varicosities] : no varicosities [No Edema] : there was no peripheral edema [No Extremity Clubbing/Cyanosis] : no extremity clubbing/cyanosis [Pedal Pulses Present] : the pedal pulses are present [No Palpable Aorta] : no palpable aorta [Soft] : abdomen soft [Non Tender] : non-tender [Non-distended] : non-distended [No Masses] : no abdominal mass palpated [No HSM] : no HSM [Normal Bowel Sounds] : normal bowel sounds [No CVA Tenderness] : no CVA  tenderness [Normal Anterior Cervical Nodes] : no anterior cervical lymphadenopathy [Normal Posterior Cervical Nodes] : no posterior cervical lymphadenopathy [No Spinal Tenderness] : no spinal tenderness [No Joint Swelling] : no joint swelling [Grossly Normal Strength/Tone] : grossly normal strength/tone [No Rash] : no rash [Normal Affect] : the affect was normal [Normal Insight/Judgement] : insight and judgment were intact [de-identified] : right hemiparesis

## 2019-05-09 NOTE — HISTORY OF PRESENT ILLNESS
[FreeTextEntry1] : s/p cva\par  [de-identified] : wheelchair bound\par s/p cva\par has started pt/ot and will soon be going to speech\par patient is alert but speech is impaired though seems to understand what is happening\par needs transportation and has not been set up yet

## 2019-05-28 ENCOUNTER — MEDICATION RENEWAL (OUTPATIENT)
Age: 84
End: 2019-05-28

## 2019-05-29 PROCEDURE — 97112 NEUROMUSCULAR REEDUCATION: CPT

## 2019-05-29 PROCEDURE — 97163 PT EVAL HIGH COMPLEX 45 MIN: CPT

## 2019-05-29 PROCEDURE — 97110 THERAPEUTIC EXERCISES: CPT

## 2019-05-29 PROCEDURE — 97116 GAIT TRAINING THERAPY: CPT

## 2019-06-07 ENCOUNTER — MEDICATION RENEWAL (OUTPATIENT)
Age: 84
End: 2019-06-07

## 2019-07-11 ENCOUNTER — APPOINTMENT (OUTPATIENT)
Dept: INTERNAL MEDICINE | Facility: CLINIC | Age: 84
End: 2019-07-11

## 2019-07-22 ENCOUNTER — RECORD ABSTRACTING (OUTPATIENT)
Age: 84
End: 2019-07-22

## 2019-07-26 ENCOUNTER — APPOINTMENT (OUTPATIENT)
Dept: NEUROLOGY | Facility: CLINIC | Age: 84
End: 2019-07-26
Payer: MEDICAID

## 2019-07-26 VITALS
DIASTOLIC BLOOD PRESSURE: 70 MMHG | HEIGHT: 59 IN | BODY MASS INDEX: 30.24 KG/M2 | WEIGHT: 150 LBS | SYSTOLIC BLOOD PRESSURE: 110 MMHG

## 2019-07-26 PROCEDURE — 99214 OFFICE O/P EST MOD 30 MIN: CPT

## 2019-09-10 NOTE — PROGRESS NOTE ADULT - CONSTITUTIONAL
General
Well-developed, well nourished
detailed exam

## 2019-11-12 ENCOUNTER — APPOINTMENT (OUTPATIENT)
Dept: INTERNAL MEDICINE | Facility: CLINIC | Age: 84
End: 2019-11-12
Payer: MEDICAID

## 2019-11-12 ENCOUNTER — LABORATORY RESULT (OUTPATIENT)
Age: 84
End: 2019-11-12

## 2019-11-12 VITALS — WEIGHT: 105 LBS | HEIGHT: 59 IN | BODY MASS INDEX: 21.17 KG/M2

## 2019-11-12 VITALS — RESPIRATION RATE: 12 BRPM | HEART RATE: 70 BPM | SYSTOLIC BLOOD PRESSURE: 118 MMHG | DIASTOLIC BLOOD PRESSURE: 78 MMHG

## 2019-11-12 DIAGNOSIS — Z23 ENCOUNTER FOR IMMUNIZATION: ICD-10-CM

## 2019-11-12 DIAGNOSIS — K59.01 SLOW TRANSIT CONSTIPATION: ICD-10-CM

## 2019-11-12 PROCEDURE — 90472 IMMUNIZATION ADMIN EACH ADD: CPT

## 2019-11-12 PROCEDURE — 99214 OFFICE O/P EST MOD 30 MIN: CPT | Mod: 25

## 2019-11-12 PROCEDURE — 90653 IIV ADJUVANT VACCINE IM: CPT

## 2019-11-12 PROCEDURE — G0009: CPT

## 2019-11-12 PROCEDURE — 90732 PPSV23 VACC 2 YRS+ SUBQ/IM: CPT

## 2019-11-12 NOTE — ASSESSMENT
[FreeTextEntry1] : flu and pneumovax\par needs nutritional support\par check labs\par trazadone for sleep

## 2019-11-12 NOTE — HISTORY OF PRESENT ILLNESS
[de-identified] : follow up cva\par has expressive aphasia, right hemiparesis\par needs nutritional supplements for her meals\par not currently being covered by [employer]\par patient understands but has expressive issues\par has no chest pain sob nvd or palpitations\par needs sleep aid [FreeTextEntry1] : follow up cva

## 2019-11-12 NOTE — PHYSICAL EXAM
[No Acute Distress] : no acute distress [Well Nourished] : well nourished [Well Developed] : well developed [Well-Appearing] : well-appearing [Normal Sclera/Conjunctiva] : normal sclera/conjunctiva [PERRL] : pupils equal round and reactive to light [EOMI] : extraocular movements intact [Normal Outer Ear/Nose] : the outer ears and nose were normal in appearance [Normal Oropharynx] : the oropharynx was normal [No JVD] : no jugular venous distention [No Lymphadenopathy] : no lymphadenopathy [Supple] : supple [Thyroid Normal, No Nodules] : the thyroid was normal and there were no nodules present [No Respiratory Distress] : no respiratory distress  [No Accessory Muscle Use] : no accessory muscle use [Regular Rhythm] : with a regular rhythm [Normal Rate] : normal rate  [Clear to Auscultation] : lungs were clear to auscultation bilaterally [No Murmur] : no murmur heard [Normal S1, S2] : normal S1 and S2 [No Abdominal Bruit] : a ~M bruit was not heard ~T in the abdomen [No Carotid Bruits] : no carotid bruits [No Edema] : there was no peripheral edema [No Varicosities] : no varicosities [Pedal Pulses Present] : the pedal pulses are present [No Palpable Aorta] : no palpable aorta [No Extremity Clubbing/Cyanosis] : no extremity clubbing/cyanosis [Soft] : abdomen soft [Non Tender] : non-tender [No HSM] : no HSM [No Masses] : no abdominal mass palpated [Non-distended] : non-distended [Normal Bowel Sounds] : normal bowel sounds [Normal Posterior Cervical Nodes] : no posterior cervical lymphadenopathy [Normal Anterior Cervical Nodes] : no anterior cervical lymphadenopathy [No CVA Tenderness] : no CVA  tenderness [No Spinal Tenderness] : no spinal tenderness [Grossly Normal Strength/Tone] : grossly normal strength/tone [No Joint Swelling] : no joint swelling [No Rash] : no rash [Normal Insight/Judgement] : insight and judgment were intact [Deep Tendon Reflexes (DTR)] : deep tendon reflexes were 2+ and symmetric [Normal Affect] : the affect was normal [de-identified] : expressive aphasia

## 2019-11-13 ENCOUNTER — CHART COPY (OUTPATIENT)
Age: 84
End: 2019-11-13

## 2019-11-13 ENCOUNTER — MEDICATION RENEWAL (OUTPATIENT)
Age: 84
End: 2019-11-13

## 2019-11-13 LAB
ALBUMIN SERPL ELPH-MCNC: 4.3 G/DL
ALP BLD-CCNC: 90 U/L
ALT SERPL-CCNC: 61 U/L
ANION GAP SERPL CALC-SCNC: 19 MMOL/L
AST SERPL-CCNC: 31 U/L
BASOPHILS # BLD AUTO: 0.03 K/UL
BASOPHILS NFR BLD AUTO: 0.9 %
BILIRUB SERPL-MCNC: 0.4 MG/DL
BUN SERPL-MCNC: 19 MG/DL
CALCIUM SERPL-MCNC: 10.7 MG/DL
CHLORIDE SERPL-SCNC: 109 MMOL/L
CHOLEST SERPL-MCNC: 190 MG/DL
CHOLEST/HDLC SERPL: 3.2 RATIO
CO2 SERPL-SCNC: 18 MMOL/L
CREAT SERPL-MCNC: 0.69 MG/DL
EOSINOPHIL # BLD AUTO: 0.06 K/UL
EOSINOPHIL NFR BLD AUTO: 1.7 %
ESTIMATED AVERAGE GLUCOSE: 105 MG/DL
GLUCOSE SERPL-MCNC: 146 MG/DL
HBA1C MFR BLD HPLC: 5.3 %
HCT VFR BLD CALC: 37.3 %
HDLC SERPL-MCNC: 59 MG/DL
HGB BLD-MCNC: 11.8 G/DL
IMM GRANULOCYTES NFR BLD AUTO: 0 %
LDLC SERPL CALC-MCNC: 108 MG/DL
LYMPHOCYTES # BLD AUTO: 1.25 K/UL
LYMPHOCYTES NFR BLD AUTO: 35.5 %
MAN DIFF?: NORMAL
MCHC RBC-ENTMCNC: 31.6 GM/DL
MCHC RBC-ENTMCNC: 33.7 PG
MCV RBC AUTO: 106.6 FL
MONOCYTES # BLD AUTO: 0.27 K/UL
MONOCYTES NFR BLD AUTO: 7.7 %
NEUTROPHILS # BLD AUTO: 1.91 K/UL
NEUTROPHILS NFR BLD AUTO: 54.2 %
PLATELET # BLD AUTO: 158 K/UL
POTASSIUM SERPL-SCNC: 4.6 MMOL/L
PROT SERPL-MCNC: 6.5 G/DL
RBC # BLD: 3.5 M/UL
RBC # FLD: 13.9 %
SODIUM SERPL-SCNC: 146 MMOL/L
TRIGL SERPL-MCNC: 115 MG/DL
TSH SERPL-ACNC: 5.14 UIU/ML
WBC # FLD AUTO: 3.52 K/UL

## 2019-12-07 ENCOUNTER — MEDICATION RENEWAL (OUTPATIENT)
Age: 84
End: 2019-12-07

## 2020-02-22 ENCOUNTER — TRANSCRIPTION ENCOUNTER (OUTPATIENT)
Age: 85
End: 2020-02-22

## 2020-02-22 ENCOUNTER — EMERGENCY (EMERGENCY)
Facility: HOSPITAL | Age: 85
LOS: 1 days | Discharge: HOME | End: 2020-02-22
Attending: EMERGENCY MEDICINE
Payer: COMMERCIAL

## 2020-02-22 VITALS
DIASTOLIC BLOOD PRESSURE: 83 MMHG | TEMPERATURE: 97 F | OXYGEN SATURATION: 100 % | SYSTOLIC BLOOD PRESSURE: 151 MMHG | HEART RATE: 97 BPM | HEIGHT: 60 IN | RESPIRATION RATE: 18 BRPM | WEIGHT: 110.01 LBS

## 2020-02-22 DIAGNOSIS — Z90.49 ACQUIRED ABSENCE OF OTHER SPECIFIED PARTS OF DIGESTIVE TRACT: Chronic | ICD-10-CM

## 2020-02-22 LAB
ALBUMIN SERPL ELPH-MCNC: 4.2 G/DL — SIGNIFICANT CHANGE UP (ref 3.3–5.2)
ALP SERPL-CCNC: 72 U/L — SIGNIFICANT CHANGE UP (ref 40–120)
ALT FLD-CCNC: 30 U/L — SIGNIFICANT CHANGE UP
ANION GAP SERPL CALC-SCNC: 11 MMOL/L — SIGNIFICANT CHANGE UP (ref 5–17)
APPEARANCE UR: CLEAR — SIGNIFICANT CHANGE UP
AST SERPL-CCNC: 44 U/L — HIGH
BACTERIA # UR AUTO: ABNORMAL
BASOPHILS # BLD AUTO: 0.02 K/UL — SIGNIFICANT CHANGE UP (ref 0–0.2)
BASOPHILS NFR BLD AUTO: 0.5 % — SIGNIFICANT CHANGE UP (ref 0–2)
BILIRUB SERPL-MCNC: 0.3 MG/DL — LOW (ref 0.4–2)
BILIRUB UR-MCNC: NEGATIVE — SIGNIFICANT CHANGE UP
BUN SERPL-MCNC: 21 MG/DL — HIGH (ref 8–20)
CALCIUM SERPL-MCNC: 10.1 MG/DL — SIGNIFICANT CHANGE UP (ref 8.6–10.2)
CHLORIDE SERPL-SCNC: 100 MMOL/L — SIGNIFICANT CHANGE UP (ref 98–107)
CO2 SERPL-SCNC: 26 MMOL/L — SIGNIFICANT CHANGE UP (ref 22–29)
COLOR SPEC: YELLOW — SIGNIFICANT CHANGE UP
CREAT SERPL-MCNC: 0.56 MG/DL — SIGNIFICANT CHANGE UP (ref 0.5–1.3)
DIFF PNL FLD: NEGATIVE — SIGNIFICANT CHANGE UP
EOSINOPHIL # BLD AUTO: 0.1 K/UL — SIGNIFICANT CHANGE UP (ref 0–0.5)
EOSINOPHIL NFR BLD AUTO: 2.6 % — SIGNIFICANT CHANGE UP (ref 0–6)
EPI CELLS # UR: SIGNIFICANT CHANGE UP
GLUCOSE SERPL-MCNC: 86 MG/DL — SIGNIFICANT CHANGE UP (ref 70–99)
GLUCOSE UR QL: NEGATIVE — SIGNIFICANT CHANGE UP
HCT VFR BLD CALC: 35.5 % — SIGNIFICANT CHANGE UP (ref 34.5–45)
HGB BLD-MCNC: 11.1 G/DL — LOW (ref 11.5–15.5)
IMM GRANULOCYTES NFR BLD AUTO: 0.3 % — SIGNIFICANT CHANGE UP (ref 0–1.5)
KETONES UR-MCNC: NEGATIVE — SIGNIFICANT CHANGE UP
LACTATE BLDV-MCNC: 1.3 MMOL/L — SIGNIFICANT CHANGE UP (ref 0.5–2)
LEUKOCYTE ESTERASE UR-ACNC: ABNORMAL
LYMPHOCYTES # BLD AUTO: 1.42 K/UL — SIGNIFICANT CHANGE UP (ref 1–3.3)
LYMPHOCYTES # BLD AUTO: 37.2 % — SIGNIFICANT CHANGE UP (ref 13–44)
MCHC RBC-ENTMCNC: 31.3 GM/DL — LOW (ref 32–36)
MCHC RBC-ENTMCNC: 32 PG — SIGNIFICANT CHANGE UP (ref 27–34)
MCV RBC AUTO: 102.3 FL — HIGH (ref 80–100)
MONOCYTES # BLD AUTO: 0.27 K/UL — SIGNIFICANT CHANGE UP (ref 0–0.9)
MONOCYTES NFR BLD AUTO: 7.1 % — SIGNIFICANT CHANGE UP (ref 2–14)
NEUTROPHILS # BLD AUTO: 2 K/UL — SIGNIFICANT CHANGE UP (ref 1.8–7.4)
NEUTROPHILS NFR BLD AUTO: 52.3 % — SIGNIFICANT CHANGE UP (ref 43–77)
NITRITE UR-MCNC: POSITIVE
PH UR: 7 — SIGNIFICANT CHANGE UP (ref 5–8)
PLATELET # BLD AUTO: 172 K/UL — SIGNIFICANT CHANGE UP (ref 150–400)
POTASSIUM SERPL-MCNC: 5.3 MMOL/L — SIGNIFICANT CHANGE UP (ref 3.5–5.3)
POTASSIUM SERPL-SCNC: 5.3 MMOL/L — SIGNIFICANT CHANGE UP (ref 3.5–5.3)
PROT SERPL-MCNC: 6.8 G/DL — SIGNIFICANT CHANGE UP (ref 6.6–8.7)
PROT UR-MCNC: NEGATIVE — SIGNIFICANT CHANGE UP
RBC # BLD: 3.47 M/UL — LOW (ref 3.8–5.2)
RBC # FLD: 13 % — SIGNIFICANT CHANGE UP (ref 10.3–14.5)
RBC CASTS # UR COMP ASSIST: NEGATIVE /HPF — SIGNIFICANT CHANGE UP (ref 0–4)
SODIUM SERPL-SCNC: 137 MMOL/L — SIGNIFICANT CHANGE UP (ref 135–145)
SP GR SPEC: 1 — LOW (ref 1.01–1.02)
UROBILINOGEN FLD QL: NEGATIVE — SIGNIFICANT CHANGE UP
WBC # BLD: 3.82 K/UL — SIGNIFICANT CHANGE UP (ref 3.8–10.5)
WBC # FLD AUTO: 3.82 K/UL — SIGNIFICANT CHANGE UP (ref 3.8–10.5)
WBC UR QL: SIGNIFICANT CHANGE UP

## 2020-02-22 PROCEDURE — 71045 X-RAY EXAM CHEST 1 VIEW: CPT | Mod: 26

## 2020-02-22 PROCEDURE — 73502 X-RAY EXAM HIP UNI 2-3 VIEWS: CPT | Mod: 26,LT

## 2020-02-22 PROCEDURE — 93010 ELECTROCARDIOGRAM REPORT: CPT

## 2020-02-22 PROCEDURE — 99285 EMERGENCY DEPT VISIT HI MDM: CPT

## 2020-02-22 RX ORDER — CYCLOBENZAPRINE HYDROCHLORIDE 10 MG/1
5 TABLET, FILM COATED ORAL ONCE
Refills: 0 | Status: COMPLETED | OUTPATIENT
Start: 2020-02-22 | End: 2020-02-22

## 2020-02-22 RX ORDER — IBUPROFEN 200 MG
400 TABLET ORAL ONCE
Refills: 0 | Status: COMPLETED | OUTPATIENT
Start: 2020-02-22 | End: 2020-02-22

## 2020-02-22 RX ADMIN — Medication 400 MILLIGRAM(S): at 20:42

## 2020-02-22 RX ADMIN — CYCLOBENZAPRINE HYDROCHLORIDE 5 MILLIGRAM(S): 10 TABLET, FILM COATED ORAL at 20:40

## 2020-02-22 RX ADMIN — Medication 400 MILLIGRAM(S): at 21:38

## 2020-02-22 NOTE — ED PROVIDER NOTE - PATIENT PORTAL LINK FT
You can access the FollowMyHealth Patient Portal offered by [facility] by registering at the following website: [email or web address]. By joining [employer]’s FollowMyHealth portal, you will also be able to view your health information using other applications (apps) compatible with our system.

## 2020-02-22 NOTE — ED PROVIDER NOTE - PHYSICAL EXAMINATION
General:     NAD, well-nourished, well-appearing  Head:     NC/AT, EOMI, oral mucosa moist  Neck:     trachea midline  Lungs:     CTA b/l, no w/r/r  CVS:     S1S2, RRR, no m/g/r  Abd:     +BS, s/nt/nd, no organomegaly. TTP left ischium full ROM   Ext:    2+ radial and pedal pulses, no c/c/e  Neuro: AAOx3, no sensory/motor deficits General:     NAD, well-nourished, well-appearing  Head:     NC/AT, EOMI, oral mucosa moist  Neck:     trachea midline  Lungs:     CTA b/l, no w/r/r  CVS:     S1S2, RRR, no m/g/r  Abd:     +BS, s/nt/nd, no organomegaly. TTP left ischium full ROM @hip/knee, ankle.  Ext:    2+ radial and pedal pulses, no c/c/e  Neuro: baseline right sided weakness(from prior CVA) General:     NAD, well-nourished, well-appearing  Head:     NC/AT, EOMI, oral mucosa moist  Neck:     trachea midline  Lungs:     CTA b/l, no w/r/r  CVS:     S1S2, RRR, no m/g/r  Abd:     +BS, s/nt/nd, no organomegaly. TTP left ischium full ROM @hip/knee, ankle.  BACK: nt midline c/t/l/s spine, ttp @ left lateral paraspinal muscles.   Ext:    2+ radial and pedal pulses, no c/c/e  Neuro: baseline right sided weakness(from prior CVA)

## 2020-02-22 NOTE — ED PROVIDER NOTE - CARE PLAN
Principal Discharge DX:	Constipation  Secondary Diagnosis:	Lumbosacral strain, initial encounter  Secondary Diagnosis:	UTI (urinary tract infection)

## 2020-02-22 NOTE — ED STATDOCS - PROGRESS NOTE DETAILS
Eleni BALTAZAR for ED attending, Dr. Bell. 88 y/o female with PMHx of CVA, and HTN presents to ED c/o buttock pain/injury. Patient states she has pain and feels a lump on the left buttock, unable to ambulate. Denies fall. Focused eval, protocol orders entered. Pt to be moved to main ED for further evaluation by another provider.

## 2020-02-22 NOTE — ED ADULT NURSE NOTE - OBJECTIVE STATEMENT
Pt is alert and oriented x3 with right sided deficits from prior stroke in 2018. Pt presents with left sided buttocks pain x1 week. Pt went to urgent care and was told to come to ED for further workup and eval. Pt c/o pain and tenderness to touch, a mass is felt a the site, no edema or redness noted. Pt has family at bedside and is German speaking.

## 2020-02-22 NOTE — ED PROVIDER NOTE - NS ED ROS FT
Constitutional: (-) fever  (-)chills  (-)sweats  Eyes/ENT: (-) blurry vision, (-) epistaxis  (-)rhinorrhea   (-) sore throat    Cardiovascular: (-) chest pain, (-) palpitations (-) edema   Respiratory: (-) cough, (-) shortness of breath   Gastrointestinal: (-)nausea  (-)vomiting, (-) diarrhea  (-) abdominal pain (+) constipation (+)left buttock pain  :  (-)dysuria, (-)frequency, (-)urgency, (-)hematuria  Musculoskeletal: (-) neck pain, (-) back pain, (-) joint pain  Integumentary: (-) rash, (-) edema  Neurological: (-) headache, (-) altered mental status  (-)LOC

## 2020-02-22 NOTE — ED PROVIDER NOTE - NSFOLLOWUPINSTRUCTIONS_ED_ALL_ED_FT
Urinary Tract Infection    A urinary tract infection (UTI) is an infection of any part of the urinary tract, which includes the kidneys, ureters, bladder, and urethra. Risk factors include ignoring your need to urinate, wiping back to front if female, being an uncircumcised male, and having diabetes or a weak immune system. Symptoms include frequent urination, pain or burning with urination, foul smelling urine, cloudy urine, pain in the lower abdomen, blood in the urine, and fever. If you were prescribed an antibiotic medicine, take it as told by your health care provider. Do not stop taking the antibiotic even if you start to feel better.    SEEK IMMEDIATE MEDICAL CARE IF YOU HAVE ANY OF THE FOLLOWING SYMPTOMS: severe back or abdominal pain, fever, inability to keep fluids or medicine down, dizziness/lightheadedness, or a change in mental status.    Back Pain    Back pain is very common in adults. The cause of back pain is rarely dangerous and the pain often gets better over time. The cause of your back pain may not be known and may include strain of muscles or ligaments, degeneration of the spinal disks, or arthritis. Occasionally the pain may radiate down your leg(s). Over-the-counter medicines to reduce pain and inflammation are often the most helpful. Stretching and remaining active frequently helps the healing process.     SEEK IMMEDIATE MEDICAL CARE IF YOU HAVE ANY OF THE FOLLOWING SYMPTOMS: bowel or bladder control problems, unusual weakness or numbness in your arms or legs, nausea or vomiting, abdominal pain, fever, dizziness/lightheadedness.    Constipation    Constipation is when a person has fewer than three bowel movements a week, has difficulty having a bowel movement, or has stools that are dry, hard, or larger than normal. Other symptoms can include abdominal pain or bloating. As people grow older, constipation is more common. A low-fiber diet, not taking in enough fluids, and taking certain medicines, including opioid painkillers, may make constipation worse. Treatment varies but may include dietary modifications (more fiber-rich foods), lifestyle modifications, and possible medications.     SEEK IMMEDIATE MEDICAL CARE IF YOU HAVE ANY OF THE FOLLOWING SYMPTOMS: bright red blood in your stool, constipation for longer than 4 days, abdominal or rectal pain, unexplained weight loss, or inability to pass gas.

## 2020-02-23 VITALS
DIASTOLIC BLOOD PRESSURE: 59 MMHG | RESPIRATION RATE: 18 BRPM | HEART RATE: 67 BPM | SYSTOLIC BLOOD PRESSURE: 116 MMHG | TEMPERATURE: 98 F | OXYGEN SATURATION: 98 %

## 2020-02-23 PROCEDURE — 74177 CT ABD & PELVIS W/CONTRAST: CPT | Mod: 26

## 2020-02-23 RX ORDER — CEFPODOXIME PROXETIL 100 MG
100 TABLET ORAL ONCE
Refills: 0 | Status: COMPLETED | OUTPATIENT
Start: 2020-02-23 | End: 2020-02-23

## 2020-02-23 RX ORDER — CYCLOBENZAPRINE HYDROCHLORIDE 10 MG/1
1 TABLET, FILM COATED ORAL
Qty: 20 | Refills: 0
Start: 2020-02-23

## 2020-02-23 RX ORDER — CEFTRIAXONE 500 MG/1
1000 INJECTION, POWDER, FOR SOLUTION INTRAMUSCULAR; INTRAVENOUS ONCE
Refills: 0 | Status: DISCONTINUED | OUTPATIENT
Start: 2020-02-23 | End: 2020-02-23

## 2020-02-23 RX ORDER — CEFPODOXIME PROXETIL 100 MG
1 TABLET ORAL
Qty: 14 | Refills: 0
Start: 2020-02-23 | End: 2020-02-29

## 2020-02-23 RX ADMIN — Medication 100 MILLIGRAM(S): at 03:55

## 2020-02-24 LAB
CULTURE RESULTS: SIGNIFICANT CHANGE UP
SPECIMEN SOURCE: SIGNIFICANT CHANGE UP

## 2020-02-27 NOTE — PROGRESS NOTE ADULT - ASSESSMENT
Rapid Response Nurse Chart Check     Chart check completed, abnormal VS noted. Tele called Rapid RN stated the patient is having runs of V. Tach. Per Bedside RN Rosa, no issues with the patient, no concerns verbalized at this time, instructed to call [phone] for further concerns or assistance.           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 Was restarted later, which may be source of elevated LFTs. Discussed with neuro 12/4 who is agreeable with stopping statin. LFTs IMPROVED 12/7, continue to monitor  -continue Fluoxetine  -Continue OT, PT, SLP services  -Recreational therapies  -resting hand splint ordered for tone  - -dcd tylenol for pain, added naproxen 12/1  -spasticity improved on balcofen 5 bid. monitor efficacy and LFTs stable, increased back to TID 12/7 as patient with good response, and LFTs improving likely statin-related    #Cardiovascular  - no PFO on ECHO, EF 74%, HTN  -Losartan.    #s/p Rx UTI, +Enterobacter on Cx  -completed Rx 11/29    #GI-  -dc tylenol with start of baclofen  -Colace and Senna changed from PRN to standing orders    #Elevated LFTs  -LFTs improved.  Tylenol discontinued    #Anemia  -F/U CBC    -# DIET  upgraded to mechanical soft and honey thickened liquids    #ecchymoses rght chest wall, no known trauma, non tender  -Xray negative for rib fx. continue to monitor      #Code Status: As per meeting with Palliative Team at [facility]  pt is DNR/DNI,  TRIAL OF NG TUBE IF BECOMES DYSPHASIC, IV FLUIDS AS NEEDED,  ANTIBIOTICS FOR INFECTION, RETURN TO HOSPITAL, FULL MEDICAL MANAGEMENT SHORT OF INTUBATION AND RESUSCITATION    # Case discussed in IDT rounds 12/4  Caregiver training in progress. patient requires 1-2 person assist for transfers , is non ambulatory, and currently working on medical follow up, equipment needs, home modifications and caregiver training in preparation for dc home. Extend dc date to address these issues from 12/7/18 to 12/12/18      LABS:  CBC, CMP 12/10

## 2020-02-27 NOTE — ED PROVIDER NOTE - TOBACCO USE
Chief Complaint   Patient presents with    Abdominal Pain       Pt preferred language for health care discussion is english. Is someone accompanying this pt? no    Is the patient using any DME equipment during [address]? no    Depression Screening:  3 most recent PHQ Screens 2/27/2020 2/27/2020 8/25/2016   Little interest or pleasure in doing things Not at all Not at all Not at all   Feeling down, depressed, irritable, or hopeless Not at all Not at all Not at all   Total Score PHQ 2 0 0 0       Learning Assessment:  No flowsheet data found. Health Maintenance reviewed and discussed per provider. Yes        Advance Directive:  1. Do you have an advance directive in place? Patient Reply:no    2. If not, would you like material regarding how to put one in place? Patient Reply: no    Coordination of Care:  1. Have you been to the ER, urgent care clinic since your last visit? Hospitalized since your last visit? no    2. Have you seen or consulted any other health care providers outside of the [address] [place] [address] since your last visit? Include any pap smears or colon screening.  on    Provider prefers to do his own med reconciliation Never smoker

## 2020-03-04 ENCOUNTER — APPOINTMENT (OUTPATIENT)
Dept: INTERNAL MEDICINE | Facility: CLINIC | Age: 85
End: 2020-03-04
Payer: MEDICAID

## 2020-03-04 VITALS
WEIGHT: 110 LBS | SYSTOLIC BLOOD PRESSURE: 116 MMHG | DIASTOLIC BLOOD PRESSURE: 78 MMHG | HEIGHT: 59 IN | BODY MASS INDEX: 22.18 KG/M2

## 2020-03-04 DIAGNOSIS — G47.9 SLEEP DISORDER, UNSPECIFIED: ICD-10-CM

## 2020-03-04 DIAGNOSIS — L89.310 PRESSURE ULCER OF RIGHT BUTTOCK, UNSTAGEABLE: ICD-10-CM

## 2020-03-04 PROCEDURE — 99214 OFFICE O/P EST MOD 30 MIN: CPT

## 2020-03-04 NOTE — PHYSICAL EXAM
[No Acute Distress] : no acute distress [Well Nourished] : well nourished [Well Developed] : well developed [Well-Appearing] : well-appearing [Normal Sclera/Conjunctiva] : normal sclera/conjunctiva [PERRL] : pupils equal round and reactive to light [EOMI] : extraocular movements intact [Normal Outer Ear/Nose] : the outer ears and nose were normal in appearance [Normal Oropharynx] : the oropharynx was normal [No Lymphadenopathy] : no lymphadenopathy [No JVD] : no jugular venous distention [Supple] : supple [Thyroid Normal, No Nodules] : the thyroid was normal and there were no nodules present [No Respiratory Distress] : no respiratory distress  [No Accessory Muscle Use] : no accessory muscle use [Clear to Auscultation] : lungs were clear to auscultation bilaterally [Normal Rate] : normal rate  [Regular Rhythm] : with a regular rhythm [Normal S1, S2] : normal S1 and S2 [No Murmur] : no murmur heard [No Carotid Bruits] : no carotid bruits [No Abdominal Bruit] : a ~M bruit was not heard ~T in the abdomen [No Varicosities] : no varicosities [Pedal Pulses Present] : the pedal pulses are present [No Edema] : there was no peripheral edema [No Palpable Aorta] : no palpable aorta [No Extremity Clubbing/Cyanosis] : no extremity clubbing/cyanosis [Soft] : abdomen soft [Non Tender] : non-tender [Non-distended] : non-distended [No Masses] : no abdominal mass palpated [No HSM] : no HSM [Normal Bowel Sounds] : normal bowel sounds [Normal Posterior Cervical Nodes] : no posterior cervical lymphadenopathy [No CVA Tenderness] : no CVA  tenderness [Normal Anterior Cervical Nodes] : no anterior cervical lymphadenopathy [No Spinal Tenderness] : no spinal tenderness [No Joint Swelling] : no joint swelling [Grossly Normal Strength/Tone] : grossly normal strength/tone [No Rash] : no rash [Coordination Grossly Intact] : coordination grossly intact [Normal Gait] : normal gait [Deep Tendon Reflexes (DTR)] : deep tendon reflexes were 2+ and symmetric [Normal Affect] : the affect was normal [Normal Insight/Judgement] : insight and judgment were intact [de-identified] : decubitus right buttocka [de-identified] : right hemirparesis,

## 2020-03-04 NOTE — ASSESSMENT
[FreeTextEntry1] : pressur injury right buttock no skin break down but must be rotated every 2 hours\par bp stable\par sleep issues increase trazadone to 100\par neurontin for cva related neuropathy\par follow up 3months

## 2020-03-04 NOTE — HISTORY OF PRESENT ILLNESS
[FreeTextEntry1] : follow up cva [de-identified] : follow up cva\par has been improving but feels arm burning pain right side \par her aphasia has improved and she is alert and oriented\par she has trouble sleeping but is able to swallow well\par she has no chst pain sob nvd or palpiatosn

## 2020-07-27 ENCOUNTER — LABORATORY RESULT (OUTPATIENT)
Age: 85
End: 2020-07-27

## 2020-07-27 ENCOUNTER — APPOINTMENT (OUTPATIENT)
Dept: INTERNAL MEDICINE | Facility: CLINIC | Age: 85
End: 2020-07-27
Payer: MEDICAID

## 2020-07-27 VITALS
RESPIRATION RATE: 14 BRPM | DIASTOLIC BLOOD PRESSURE: 68 MMHG | HEIGHT: 59 IN | BODY MASS INDEX: 22.18 KG/M2 | HEART RATE: 66 BPM | SYSTOLIC BLOOD PRESSURE: 110 MMHG | WEIGHT: 110 LBS

## 2020-07-27 VITALS
RESPIRATION RATE: 12 BRPM | HEART RATE: 66 BPM | HEIGHT: 59 IN | TEMPERATURE: 98.3 F | OXYGEN SATURATION: 98 % | DIASTOLIC BLOOD PRESSURE: 58 MMHG | SYSTOLIC BLOOD PRESSURE: 110 MMHG

## 2020-07-27 DIAGNOSIS — L03.90 CELLULITIS, UNSPECIFIED: ICD-10-CM

## 2020-07-27 PROCEDURE — 36415 COLL VENOUS BLD VENIPUNCTURE: CPT

## 2020-07-27 PROCEDURE — 99214 OFFICE O/P EST MOD 30 MIN: CPT | Mod: 25

## 2020-07-27 RX ORDER — MUPIROCIN 20 MG/G
2 OINTMENT TOPICAL 3 TIMES DAILY
Qty: 1 | Refills: 4 | Status: ACTIVE | COMMUNITY
Start: 2020-07-27 | End: 1900-01-01

## 2020-07-27 NOTE — HISTORY OF PRESENT ILLNESS
[FreeTextEntry1] : left back cellulitis [de-identified] : left back cellulitis\par has history of cva\par has a scab over the back\par likely from not being rotated\par patient has no chest pain sob nvd or palptiaotns

## 2020-07-27 NOTE — HEALTH RISK ASSESSMENT
[No] : No [No falls in past year] : Patient reported no falls in the past year [0] : 2) Feeling down, depressed, or hopeless: Not at all (0)

## 2020-07-27 NOTE — ASSESSMENT
[FreeTextEntry1] : cellulitis skin clindamyic, bactroban\par call if not better\par bp stable\par stroke stable\par follow up fall\par very pressure ulcer and needs to be rotated every two hours to prevent ulcerations

## 2020-07-27 NOTE — PHYSICAL EXAM
[No Acute Distress] : no acute distress [Well Nourished] : well nourished [Well Developed] : well developed [Normal Sclera/Conjunctiva] : normal sclera/conjunctiva [Well-Appearing] : well-appearing [PERRL] : pupils equal round and reactive to light [EOMI] : extraocular movements intact [Normal Outer Ear/Nose] : the outer ears and nose were normal in appearance [No JVD] : no jugular venous distention [Normal Oropharynx] : the oropharynx was normal [No Lymphadenopathy] : no lymphadenopathy [Supple] : supple [No Respiratory Distress] : no respiratory distress  [Thyroid Normal, No Nodules] : the thyroid was normal and there were no nodules present [No Accessory Muscle Use] : no accessory muscle use [Clear to Auscultation] : lungs were clear to auscultation bilaterally [Normal Rate] : normal rate  [Regular Rhythm] : with a regular rhythm [No Murmur] : no murmur heard [Normal S1, S2] : normal S1 and S2 [No Carotid Bruits] : no carotid bruits [No Abdominal Bruit] : a ~M bruit was not heard ~T in the abdomen [No Varicosities] : no varicosities [Pedal Pulses Present] : the pedal pulses are present [No Edema] : there was no peripheral edema [No Palpable Aorta] : no palpable aorta [Soft] : abdomen soft [No Extremity Clubbing/Cyanosis] : no extremity clubbing/cyanosis [Non Tender] : non-tender [No Masses] : no abdominal mass palpated [Non-distended] : non-distended [Normal Bowel Sounds] : normal bowel sounds [No HSM] : no HSM [Normal Anterior Cervical Nodes] : no anterior cervical lymphadenopathy [Normal Posterior Cervical Nodes] : no posterior cervical lymphadenopathy [No Spinal Tenderness] : no spinal tenderness [No CVA Tenderness] : no CVA  tenderness [Grossly Normal Strength/Tone] : grossly normal strength/tone [No Joint Swelling] : no joint swelling [Coordination Grossly Intact] : coordination grossly intact [No Focal Deficits] : no focal deficits [Normal Gait] : normal gait [Deep Tendon Reflexes (DTR)] : deep tendon reflexes were 2+ and symmetric [Normal Affect] : the affect was normal [Normal Insight/Judgement] : insight and judgment were intact [de-identified] : ulcerated red lesion left back with scab [de-identified] : right hemiparesis

## 2020-07-28 LAB
ALBUMIN SERPL ELPH-MCNC: 4.1 G/DL
ALP BLD-CCNC: 78 U/L
ALT SERPL-CCNC: 35 U/L
ANION GAP SERPL CALC-SCNC: 13 MMOL/L
AST SERPL-CCNC: 30 U/L
BASOPHILS # BLD AUTO: 0.02 K/UL
BASOPHILS NFR BLD AUTO: 0.5 %
BILIRUB SERPL-MCNC: 0.4 MG/DL
BUN SERPL-MCNC: 20 MG/DL
CALCIUM SERPL-MCNC: 10.3 MG/DL
CHLORIDE SERPL-SCNC: 109 MMOL/L
CHOLEST SERPL-MCNC: 155 MG/DL
CHOLEST/HDLC SERPL: 2.7 RATIO
CO2 SERPL-SCNC: 23 MMOL/L
CREAT SERPL-MCNC: 0.84 MG/DL
EOSINOPHIL # BLD AUTO: 0.06 K/UL
EOSINOPHIL NFR BLD AUTO: 1.6 %
ESTIMATED AVERAGE GLUCOSE: 105 MG/DL
GLUCOSE SERPL-MCNC: 141 MG/DL
HBA1C MFR BLD HPLC: 5.3 %
HCT VFR BLD CALC: 35.6 %
HDLC SERPL-MCNC: 58 MG/DL
HGB BLD-MCNC: 10.9 G/DL
IMM GRANULOCYTES NFR BLD AUTO: 0 %
LDLC SERPL CALC-MCNC: 76 MG/DL
LYMPHOCYTES # BLD AUTO: 1.18 K/UL
LYMPHOCYTES NFR BLD AUTO: 30.9 %
MAN DIFF?: NORMAL
MCHC RBC-ENTMCNC: 30.6 GM/DL
MCHC RBC-ENTMCNC: 32.4 PG
MCV RBC AUTO: 106 FL
MONOCYTES # BLD AUTO: 0.22 K/UL
MONOCYTES NFR BLD AUTO: 5.8 %
NEUTROPHILS # BLD AUTO: 2.34 K/UL
NEUTROPHILS NFR BLD AUTO: 61.2 %
PLATELET # BLD AUTO: 144 K/UL
POTASSIUM SERPL-SCNC: 4.3 MMOL/L
PROT SERPL-MCNC: 6.2 G/DL
RBC # BLD: 3.36 M/UL
RBC # FLD: 13.2 %
SODIUM SERPL-SCNC: 146 MMOL/L
TRIGL SERPL-MCNC: 104 MG/DL
TSH SERPL-ACNC: 3.96 UIU/ML
WBC # FLD AUTO: 3.82 K/UL

## 2020-08-06 ENCOUNTER — RESULT REVIEW (OUTPATIENT)
Age: 85
End: 2020-08-06

## 2020-08-06 ENCOUNTER — APPOINTMENT (OUTPATIENT)
Dept: DERMATOLOGY | Facility: CLINIC | Age: 85
End: 2020-08-06
Payer: MEDICAID

## 2020-08-06 PROCEDURE — 11104 PUNCH BX SKIN SINGLE LESION: CPT

## 2020-08-06 PROCEDURE — 11105 PUNCH BX SKIN EA SEP/ADDL: CPT

## 2020-08-06 PROCEDURE — 99202 OFFICE O/P NEW SF 15 MIN: CPT | Mod: 25

## 2020-08-06 PROCEDURE — 11103 TANGNTL BX SKIN EA SEP/ADDL: CPT

## 2020-08-13 ENCOUNTER — RX RENEWAL (OUTPATIENT)
Age: 85
End: 2020-08-13

## 2020-08-14 ENCOUNTER — RX RENEWAL (OUTPATIENT)
Age: 85
End: 2020-08-14

## 2020-08-19 ENCOUNTER — APPOINTMENT (OUTPATIENT)
Dept: DERMATOLOGY | Facility: CLINIC | Age: 85
End: 2020-08-19
Payer: MEDICAID

## 2020-08-19 PROCEDURE — 99214 OFFICE O/P EST MOD 30 MIN: CPT

## 2020-09-16 ENCOUNTER — RX RENEWAL (OUTPATIENT)
Age: 85
End: 2020-09-16

## 2020-10-28 DIAGNOSIS — Z86.19 PERSONAL HISTORY OF OTHER INFECTIOUS AND PARASITIC DISEASES: ICD-10-CM

## 2020-10-28 PROBLEM — Z00.00 ENCOUNTER FOR PREVENTIVE HEALTH EXAMINATION: Status: ACTIVE | Noted: 2020-10-28

## 2020-10-31 NOTE — ED ADULT TRIAGE NOTE - AS TEMP SITE
74 y/o female with PMHx of hypothyroidism presents to the ED s/p mechanical fall. Trauma imaging significant for superior rami displaced fracture. CT head negative      Mecha nical Fall c/b superior Pubic Rami Fracture   Ct imaging reviewed  plan:  WBAT  PT/OT consult  Pain management  check trops and ekg to ensure no occult cardiogenic event      Hx of Hypothyroidism:  c/w home synthroid    diet: regular  dvt ppx: lovenox       oral

## 2020-11-10 NOTE — ED PROVIDER NOTE - OBJECTIVE STATEMENT
88y/o F with PMHx significant for HTN and CVA presents to the ED, with son at bedside c/o left buttock pain which started a few days ago. Pain is aggravated when lying down. Assoc. sx of constipation, last BM 2 days ago. No use of NSAID for pain relief. Denies recent falls, injury. Denies fever, chills, sweats or urinary complaints.   PMHx: HTN; CVA   No tobacco/illicit substance use/socialEtOH 86y/o F with PMHx significant for HTN and CVA presents to the ED, with daughter and grandson at bedside c/o left buttock pain which started a few days ago. Pain is aggravated when lying down. Associated with constipation, last BM 2 days ago. No use of NSAID for pain relief. Denies recent falls, injury. Denies fever, chills, sweats or urinary complaints. denies vomiting. normal PO intake.    PMHx: HTN; CVA   No tobacco/illicit substance use/socialEtOH Detail Level: Simple

## 2020-11-20 ENCOUNTER — APPOINTMENT (OUTPATIENT)
Dept: INTERNAL MEDICINE | Facility: CLINIC | Age: 85
End: 2020-11-20

## 2020-12-23 PROBLEM — Z86.19 HISTORY OF CANDIDIASIS OF VAGINA: Status: RESOLVED | Noted: 2020-10-28 | Resolved: 2020-12-23

## 2021-01-23 ENCOUNTER — RX RENEWAL (OUTPATIENT)
Age: 86
End: 2021-01-23

## 2021-01-28 ENCOUNTER — APPOINTMENT (OUTPATIENT)
Dept: INTERNAL MEDICINE | Facility: CLINIC | Age: 86
End: 2021-01-28
Payer: MEDICAID

## 2021-01-28 VITALS — HEART RATE: 72 BPM | SYSTOLIC BLOOD PRESSURE: 114 MMHG | DIASTOLIC BLOOD PRESSURE: 78 MMHG | RESPIRATION RATE: 12 BRPM

## 2021-01-28 DIAGNOSIS — L89.90 PRESSURE ULCER OF UNSPECIFIED SITE, UNSPECIFIED STAGE: ICD-10-CM

## 2021-01-28 PROCEDURE — 99072 ADDL SUPL MATRL&STAF TM PHE: CPT

## 2021-01-28 PROCEDURE — 90662 IIV NO PRSV INCREASED AG IM: CPT

## 2021-01-28 PROCEDURE — 99214 OFFICE O/P EST MOD 30 MIN: CPT | Mod: 25

## 2021-01-28 PROCEDURE — G0008: CPT

## 2021-01-28 NOTE — HISTORY OF PRESENT ILLNESS
[FreeTextEntry1] : follow up [de-identified] : follow up \par needs letter to travel saying she is well to let her daughter make financial decisos\par history of htn\par has cva but has been well

## 2021-01-28 NOTE — PHYSICAL EXAM
[No Acute Distress] : no acute distress [Well Nourished] : well nourished [Well Developed] : well developed [Well-Appearing] : well-appearing [Normal Sclera/Conjunctiva] : normal sclera/conjunctiva [PERRL] : pupils equal round and reactive to light [EOMI] : extraocular movements intact [Normal Outer Ear/Nose] : the outer ears and nose were normal in appearance [Normal Oropharynx] : the oropharynx was normal [No JVD] : no jugular venous distention [No Lymphadenopathy] : no lymphadenopathy [Supple] : supple [Thyroid Normal, No Nodules] : the thyroid was normal and there were no nodules present [No Respiratory Distress] : no respiratory distress  [No Accessory Muscle Use] : no accessory muscle use [Clear to Auscultation] : lungs were clear to auscultation bilaterally [Normal Rate] : normal rate  [Regular Rhythm] : with a regular rhythm [Normal S1, S2] : normal S1 and S2 [No Murmur] : no murmur heard [No Carotid Bruits] : no carotid bruits [No Abdominal Bruit] : a ~M bruit was not heard ~T in the abdomen [No Varicosities] : no varicosities [Pedal Pulses Present] : the pedal pulses are present [No Edema] : there was no peripheral edema [No Palpable Aorta] : no palpable aorta [No Extremity Clubbing/Cyanosis] : no extremity clubbing/cyanosis [Soft] : abdomen soft [Non Tender] : non-tender [Non-distended] : non-distended [No Masses] : no abdominal mass palpated [No HSM] : no HSM [Normal Bowel Sounds] : normal bowel sounds [Normal Posterior Cervical Nodes] : no posterior cervical lymphadenopathy [Normal Anterior Cervical Nodes] : no anterior cervical lymphadenopathy [No CVA Tenderness] : no CVA  tenderness [No Spinal Tenderness] : no spinal tenderness [No Joint Swelling] : no joint swelling [Grossly Normal Strength/Tone] : grossly normal strength/tone [No Rash] : no rash [Coordination Grossly Intact] : coordination grossly intact [No Focal Deficits] : no focal deficits [Normal Gait] : normal gait [Deep Tendon Reflexes (DTR)] : deep tendon reflexes were 2+ and symmetric [Normal Affect] : the affect was normal [Normal Insight/Judgement] : insight and judgment were intact [de-identified] : aphasis

## 2021-01-28 NOTE — ASSESSMENT
[FreeTextEntry1] : ulcer buttocks\par silvadene\par bp stble\par continue meds\par flu vaccine given\par follow up prn

## 2021-01-29 ENCOUNTER — NON-APPOINTMENT (OUTPATIENT)
Age: 86
End: 2021-01-29

## 2021-01-29 LAB
ALBUMIN SERPL ELPH-MCNC: 4.6 G/DL
ALP BLD-CCNC: 98 U/L
ALT SERPL-CCNC: 47 U/L
ANION GAP SERPL CALC-SCNC: 22 MMOL/L
AST SERPL-CCNC: 43 U/L
BASOPHILS # BLD AUTO: 0.02 K/UL
BASOPHILS NFR BLD AUTO: 0.4 %
BILIRUB SERPL-MCNC: 0.3 MG/DL
BUN SERPL-MCNC: 22 MG/DL
CALCIUM SERPL-MCNC: 10.1 MG/DL
CHLORIDE SERPL-SCNC: 112 MMOL/L
CHOLEST SERPL-MCNC: 175 MG/DL
CO2 SERPL-SCNC: 16 MMOL/L
CREAT SERPL-MCNC: 1.02 MG/DL
EOSINOPHIL # BLD AUTO: 0.1 K/UL
EOSINOPHIL NFR BLD AUTO: 2 %
ESTIMATED AVERAGE GLUCOSE: 108 MG/DL
GLUCOSE SERPL-MCNC: 101 MG/DL
HBA1C MFR BLD HPLC: 5.4 %
HCT VFR BLD CALC: 41 %
HDLC SERPL-MCNC: 64 MG/DL
HGB BLD-MCNC: 12.6 G/DL
IMM GRANULOCYTES NFR BLD AUTO: 0.2 %
LDLC SERPL CALC-MCNC: 92 MG/DL
LYMPHOCYTES # BLD AUTO: 1.57 K/UL
LYMPHOCYTES NFR BLD AUTO: 32.2 %
MAN DIFF?: NORMAL
MCHC RBC-ENTMCNC: 30.7 GM/DL
MCHC RBC-ENTMCNC: 32.2 PG
MCV RBC AUTO: 104.9 FL
MONOCYTES # BLD AUTO: 0.5 K/UL
MONOCYTES NFR BLD AUTO: 10.2 %
NEUTROPHILS # BLD AUTO: 2.68 K/UL
NEUTROPHILS NFR BLD AUTO: 55 %
NONHDLC SERPL-MCNC: 110 MG/DL
PLATELET # BLD AUTO: 137 K/UL
POTASSIUM SERPL-SCNC: 4.6 MMOL/L
PROT SERPL-MCNC: 6.8 G/DL
RBC # BLD: 3.91 M/UL
RBC # FLD: 13.7 %
SODIUM SERPL-SCNC: 149 MMOL/L
TRIGL SERPL-MCNC: 93 MG/DL
WBC # FLD AUTO: 4.88 K/UL

## 2021-05-19 ENCOUNTER — APPOINTMENT (OUTPATIENT)
Dept: INTERNAL MEDICINE | Facility: CLINIC | Age: 86
End: 2021-05-19
Payer: MEDICAID

## 2021-05-19 VITALS
BODY MASS INDEX: 22.18 KG/M2 | WEIGHT: 110 LBS | HEART RATE: 68 BPM | HEIGHT: 59 IN | RESPIRATION RATE: 14 BRPM | DIASTOLIC BLOOD PRESSURE: 78 MMHG | SYSTOLIC BLOOD PRESSURE: 130 MMHG

## 2021-05-19 DIAGNOSIS — L84 CORNS AND CALLOSITIES: ICD-10-CM

## 2021-05-19 DIAGNOSIS — M54.30 SCIATICA, UNSPECIFIED SIDE: ICD-10-CM

## 2021-05-19 PROCEDURE — 99214 OFFICE O/P EST MOD 30 MIN: CPT

## 2021-05-19 RX ORDER — CLINDAMYCIN HYDROCHLORIDE 300 MG/1
300 CAPSULE ORAL EVERY 8 HOURS
Qty: 30 | Refills: 0 | Status: DISCONTINUED | COMMUNITY
Start: 2020-07-27 | End: 2021-05-19

## 2021-05-19 NOTE — ASSESSMENT
[FreeTextEntry1] : SCIATICA VS NEUROPATHIC PAIN TRY STEROIDS\par INCREASE NEURONTIN TO 300MG \par SEE PODIATRYS FOR CALLOUS\par BP STBLE\par STROKE UNCHANGED\par RECIEVED COVID VACCINE

## 2021-05-19 NOTE — HISTORY OF PRESENT ILLNESS
[Musculoskeletal Symptoms Legs] : leg [Musculoskeletal Symptoms Knees] : knee [Musculoskeletal Symptoms Hips] : hip [___ Weeks ago] : [unfilled] weeks ago [Episodic] : episodic [Moderate] : moderate [At Night] : at night [Worsening] : worsening [de-identified] : RIGHT [FreeTextEntry8] : RIGHT BACK GIULIANA\par RIGHT LEG PAIN\par RIGHT HIP PAIN\par PAITENT WITH CVA HAS RIGHT HEMIPARESIS LOWER, RIGHT HEMIPLEGIA UPPER\par HAS PAIN WORST AT NIGHT\par DURING THE DAY FUNCTIONS BETTER\par HAS NO CHEST PAIN SOB NVD OR PALPITATONS\par IS ABLE TO SWALLOW\par ALSO HAS PAINFUL CALLOUS OF HER FOOT LIKES TO WEAR SHOES A LOT THOUGH WHEELCHAIR BOUND

## 2021-05-19 NOTE — PHYSICAL EXAM
[No Acute Distress] : no acute distress [Well Nourished] : well nourished [Well Developed] : well developed [Well-Appearing] : well-appearing [Normal Sclera/Conjunctiva] : normal sclera/conjunctiva [PERRL] : pupils equal round and reactive to light [EOMI] : extraocular movements intact [Normal Outer Ear/Nose] : the outer ears and nose were normal in appearance [Normal Oropharynx] : the oropharynx was normal [No JVD] : no jugular venous distention [No Lymphadenopathy] : no lymphadenopathy [Supple] : supple [Thyroid Normal, No Nodules] : the thyroid was normal and there were no nodules present [No Respiratory Distress] : no respiratory distress  [Clear to Auscultation] : lungs were clear to auscultation bilaterally [No Accessory Muscle Use] : no accessory muscle use [Normal Rate] : normal rate  [Regular Rhythm] : with a regular rhythm [Normal S1, S2] : normal S1 and S2 [No Murmur] : no murmur heard [No Carotid Bruits] : no carotid bruits [No Abdominal Bruit] : a ~M bruit was not heard ~T in the abdomen [No Varicosities] : no varicosities [Pedal Pulses Present] : the pedal pulses are present [No Edema] : there was no peripheral edema [No Palpable Aorta] : no palpable aorta [No Extremity Clubbing/Cyanosis] : no extremity clubbing/cyanosis [Soft] : abdomen soft [Non Tender] : non-tender [Non-distended] : non-distended [No Masses] : no abdominal mass palpated [No HSM] : no HSM [Normal Bowel Sounds] : normal bowel sounds [Normal Posterior Cervical Nodes] : no posterior cervical lymphadenopathy [Normal Anterior Cervical Nodes] : no anterior cervical lymphadenopathy [No CVA Tenderness] : no CVA  tenderness [No Spinal Tenderness] : no spinal tenderness [No Rash] : no rash [Normal Affect] : the affect was normal [Normal Insight/Judgement] : insight and judgment were intact [de-identified] : RIGHT KNEE LEG HIP AND RIGHT LOWER BACK PAIN , WEAKNESS RIGHT LEG, HEMIPLEGIA RIGHT ARM [de-identified] : WHEELCHAIR BOUND

## 2021-06-23 ENCOUNTER — RX RENEWAL (OUTPATIENT)
Age: 86
End: 2021-06-23

## 2021-06-25 ENCOUNTER — RX RENEWAL (OUTPATIENT)
Age: 86
End: 2021-06-25

## 2021-06-25 RX ORDER — ASPIRIN 81 MG/1
81 TABLET, CHEWABLE ORAL
Qty: 30 | Refills: 8 | Status: ACTIVE | COMMUNITY
Start: 2021-06-25 | End: 1900-01-01

## 2021-09-29 ENCOUNTER — RX RENEWAL (OUTPATIENT)
Age: 86
End: 2021-09-29

## 2022-04-06 ENCOUNTER — RX RENEWAL (OUTPATIENT)
Age: 87
End: 2022-04-06

## 2022-04-11 ENCOUNTER — NON-APPOINTMENT (OUTPATIENT)
Age: 87
End: 2022-04-11

## 2022-04-11 ENCOUNTER — APPOINTMENT (OUTPATIENT)
Dept: INTERNAL MEDICINE | Facility: CLINIC | Age: 87
End: 2022-04-11
Payer: MEDICAID

## 2022-04-11 VITALS — SYSTOLIC BLOOD PRESSURE: 102 MMHG | DIASTOLIC BLOOD PRESSURE: 64 MMHG

## 2022-04-11 PROCEDURE — 93000 ELECTROCARDIOGRAM COMPLETE: CPT

## 2022-04-11 PROCEDURE — 99397 PER PM REEVAL EST PAT 65+ YR: CPT | Mod: 25

## 2022-04-11 RX ORDER — METHYLPREDNISOLONE 4 MG/1
4 TABLET ORAL
Qty: 1 | Refills: 1 | Status: DISCONTINUED | COMMUNITY
Start: 2021-05-19 | End: 2022-04-11

## 2022-04-11 NOTE — PHYSICAL EXAM
[No Acute Distress] : no acute distress [Well Nourished] : well nourished [Well Developed] : well developed [Well-Appearing] : well-appearing [Normal Sclera/Conjunctiva] : normal sclera/conjunctiva [PERRL] : pupils equal round and reactive to light [EOMI] : extraocular movements intact [Normal Outer Ear/Nose] : the outer ears and nose were normal in appearance [Normal Oropharynx] : the oropharynx was normal [No JVD] : no jugular venous distention [No Lymphadenopathy] : no lymphadenopathy [Supple] : supple [Thyroid Normal, No Nodules] : the thyroid was normal and there were no nodules present [No Respiratory Distress] : no respiratory distress  [No Accessory Muscle Use] : no accessory muscle use [Clear to Auscultation] : lungs were clear to auscultation bilaterally [Normal Rate] : normal rate  [Regular Rhythm] : with a regular rhythm [Normal S1, S2] : normal S1 and S2 [No Murmur] : no murmur heard [No Carotid Bruits] : no carotid bruits [No Abdominal Bruit] : a ~M bruit was not heard ~T in the abdomen [No Varicosities] : no varicosities [Pedal Pulses Present] : the pedal pulses are present [No Edema] : there was no peripheral edema [No Palpable Aorta] : no palpable aorta [No Extremity Clubbing/Cyanosis] : no extremity clubbing/cyanosis [Soft] : abdomen soft [Non Tender] : non-tender [Non-distended] : non-distended [No Masses] : no abdominal mass palpated [No HSM] : no HSM [Normal Bowel Sounds] : normal bowel sounds [Normal Posterior Cervical Nodes] : no posterior cervical lymphadenopathy [Normal Anterior Cervical Nodes] : no anterior cervical lymphadenopathy [No CVA Tenderness] : no CVA  tenderness [No Spinal Tenderness] : no spinal tenderness [No Joint Swelling] : no joint swelling [Grossly Normal Strength/Tone] : grossly normal strength/tone [No Rash] : no rash [Coordination Grossly Intact] : coordination grossly intact [No Focal Deficits] : no focal deficits [Normal Gait] : normal gait [Deep Tendon Reflexes (DTR)] : deep tendon reflexes were 2+ and symmetric [Normal Affect] : the affect was normal [Normal Insight/Judgement] : insight and judgment were intact [de-identified] : right hemiparesis

## 2022-04-11 NOTE — HISTORY OF PRESENT ILLNESS
[FreeTextEntry1] : For CPE [de-identified] : For CPE\par history of htn, hld, s/p right hemiparesis ex[ressive aphasia

## 2022-04-11 NOTE — ASSESSMENT
[FreeTextEntry1] : cva stable\par aphasia stable\par bp stable\par contnue meds\par folllow up october

## 2022-04-11 NOTE — HEALTH RISK ASSESSMENT
[Good] : ~his/her~  mood as  good [Never] : Never [No] : No [No falls in past year] : Patient reported no falls in the past year [0] : 2) Feeling down, depressed, or hopeless: Not at all (0) [PHQ-2 Negative - No further assessment needed] : PHQ-2 Negative - No further assessment needed [[id number]] : 0 [HIV test declined] : HIV test declined [Hepatitis C test declined] : Hepatitis C test declined [Change in mental status noted] : No change in mental status noted [Language] : denies difficulty with language [Behavior] : denies difficulty with behavior [Learning/Retaining New Information] : denies difficulty learning/retaining new information [Handling Complex Tasks] : denies difficulty handling complex tasks [Reasoning] : denies difficulty with reasoning [Spatial Ability and Orientation] : denies difficulty with spatial ability and orientation [None] : None [With Significant Other] : lives with significant other [Employed] : employed [] :  [Sexually Active] : not sexually active [High Risk Behavior] : no high risk behavior [Feels Safe at Home] : Feels safe at home [Fully functional (bathing, dressing, toileting, transferring, walking, feeding)] : Fully functional (bathing, dressing, toileting, transferring, walking, feeding) [Fully functional (using the telephone, shopping, preparing meals, housekeeping, doing laundry, using] : Fully functional and needs no help or supervision to perform IADLs (using the telephone, shopping, preparing meals, housekeeping, doing laundry, using transportation, managing medications and managing finances) [Reports changes in hearing] : Reports no changes in hearing [Reports changes in vision] : Reports no changes in vision [Reports normal functional visual acuity (ie: able to read med bottle)] : Reports poor functional visual acuity.  [Reports changes in dental health] : Reports no changes in dental health [Smoke Detector] : smoke detector [Carbon Monoxide Detector] : carbon monoxide detector [Guns at Home] : no guns at home [Safety elements used in home] : safety elements used in home [Seat Belt] :  uses seat belt [Sunscreen] : does not use sunscreen [Travel to Developing Areas] : does not  travel to developing areas [TB Exposure] : is not being exposed to tuberculosis [Caregiver Concerns] : does not have caregiver concerns

## 2022-04-12 ENCOUNTER — NON-APPOINTMENT (OUTPATIENT)
Age: 87
End: 2022-04-12

## 2022-04-12 LAB
25(OH)D3 SERPL-MCNC: 41.5 NG/ML
ALBUMIN SERPL ELPH-MCNC: 4.1 G/DL
ALP BLD-CCNC: 92 U/L
ALT SERPL-CCNC: 37 U/L
ANION GAP SERPL CALC-SCNC: 10 MMOL/L
AST SERPL-CCNC: 36 U/L
BASOPHILS # BLD AUTO: 0.02 K/UL
BASOPHILS NFR BLD AUTO: 0.5 %
BILIRUB SERPL-MCNC: 0.2 MG/DL
BUN SERPL-MCNC: 19 MG/DL
CALCIUM SERPL-MCNC: 10.2 MG/DL
CHLORIDE SERPL-SCNC: 106 MMOL/L
CHOLEST SERPL-MCNC: 172 MG/DL
CO2 SERPL-SCNC: 26 MMOL/L
CREAT SERPL-MCNC: 0.84 MG/DL
EGFR: 66 ML/MIN/1.73M2
EOSINOPHIL # BLD AUTO: 0.08 K/UL
EOSINOPHIL NFR BLD AUTO: 1.8 %
ESTIMATED AVERAGE GLUCOSE: 105 MG/DL
GLUCOSE SERPL-MCNC: 117 MG/DL
HBA1C MFR BLD HPLC: 5.3 %
HCT VFR BLD CALC: 33.1 %
HDLC SERPL-MCNC: 59 MG/DL
HGB BLD-MCNC: 10.6 G/DL
IMM GRANULOCYTES NFR BLD AUTO: 0.5 %
LDLC SERPL CALC-MCNC: 98 MG/DL
LYMPHOCYTES # BLD AUTO: 1.31 K/UL
LYMPHOCYTES NFR BLD AUTO: 29.6 %
MAN DIFF?: NORMAL
MCHC RBC-ENTMCNC: 32 GM/DL
MCHC RBC-ENTMCNC: 33.4 PG
MCV RBC AUTO: 104.4 FL
MONOCYTES # BLD AUTO: 0.5 K/UL
MONOCYTES NFR BLD AUTO: 11.3 %
NEUTROPHILS # BLD AUTO: 2.5 K/UL
NEUTROPHILS NFR BLD AUTO: 56.3 %
NONHDLC SERPL-MCNC: 114 MG/DL
PLATELET # BLD AUTO: 111 K/UL
POTASSIUM SERPL-SCNC: 4.3 MMOL/L
PROT SERPL-MCNC: 6.3 G/DL
RBC # BLD: 3.17 M/UL
RBC # FLD: 13.9 %
SODIUM SERPL-SCNC: 142 MMOL/L
T4 FREE SERPL-MCNC: 0.9 NG/DL
TRIGL SERPL-MCNC: 78 MG/DL
TSH SERPL-ACNC: 3.81 UIU/ML
WBC # FLD AUTO: 4.43 K/UL

## 2022-05-03 ENCOUNTER — RX RENEWAL (OUTPATIENT)
Age: 87
End: 2022-05-03

## 2022-05-24 DIAGNOSIS — M25.552 PAIN IN LEFT HIP: ICD-10-CM

## 2022-05-27 RX ORDER — DICLOFENAC SODIUM 3 G/100G
3 GEL TOPICAL
Qty: 1 | Refills: 0 | Status: ACTIVE | COMMUNITY
Start: 2022-05-24

## 2022-07-12 NOTE — ED ADULT TRIAGE NOTE - AS O2 DELIVERY
A Stent [employer] 3mm 38mm Radopq 1 Acc Port Infl was deployed in the right coronary artery. The stent was deployed at 11 lele for 12 seconds at 7/14/2022 1:06 PM . Stent balloon used for 2nd inflation at 11 lele for 5 seconds. Stent balloon used for 3rd inflation at 11 lele for 10 seconds. Stent balloon used for 4th inflation at 11 lele for 10 seconds. supplemental O2

## 2022-07-27 ENCOUNTER — APPOINTMENT (OUTPATIENT)
Dept: INTERNAL MEDICINE | Facility: CLINIC | Age: 87
End: 2022-07-27

## 2022-07-27 VITALS — DIASTOLIC BLOOD PRESSURE: 78 MMHG | SYSTOLIC BLOOD PRESSURE: 136 MMHG | RESPIRATION RATE: 12 BRPM | HEART RATE: 72 BPM

## 2022-07-27 DIAGNOSIS — M26.609 UNSPECIFIED TEMPOROMANDIBULAR JOINT DISORDER: ICD-10-CM

## 2022-07-27 DIAGNOSIS — H92.09 OTALGIA, UNSPECIFIED EAR: ICD-10-CM

## 2022-07-27 PROCEDURE — 99214 OFFICE O/P EST MOD 30 MIN: CPT

## 2022-07-27 NOTE — HISTORY OF PRESENT ILLNESS
[FreeTextEntry1] : left ear pain [de-identified] : left ear pain \par pain on and off\par pain in her jaw

## 2022-07-27 NOTE — ASSESSMENT
[FreeTextEntry1] : left ear pain \par otalgia possilby arthritis or tmj\par warm soaks\par try steroids\par see ent if not better\par stroke not verbal but understands\par follow up October\par

## 2022-08-24 RX ORDER — ASPIRIN 81 MG/1
81 TABLET, CHEWABLE ORAL
Qty: 90 | Refills: 2 | Status: ACTIVE | COMMUNITY
Start: 2018-12-12 | End: 1900-01-01

## 2022-10-07 ENCOUNTER — RX RENEWAL (OUTPATIENT)
Age: 87
End: 2022-10-07

## 2022-10-18 ENCOUNTER — APPOINTMENT (OUTPATIENT)
Dept: INTERNAL MEDICINE | Facility: CLINIC | Age: 87
End: 2022-10-18

## 2022-10-18 VITALS
RESPIRATION RATE: 16 BRPM | SYSTOLIC BLOOD PRESSURE: 128 MMHG | DIASTOLIC BLOOD PRESSURE: 78 MMHG | HEART RATE: 78 BPM | HEIGHT: 59 IN | WEIGHT: 110 LBS | BODY MASS INDEX: 22.18 KG/M2

## 2022-10-18 PROCEDURE — G0008: CPT

## 2022-10-18 PROCEDURE — 99214 OFFICE O/P EST MOD 30 MIN: CPT | Mod: 25

## 2022-10-18 PROCEDURE — 90662 IIV NO PRSV INCREASED AG IM: CPT

## 2022-10-18 RX ORDER — KRILL/OM-3/DHA/EPA/PHOSPHO/AST 1000-230MG
81 CAPSULE ORAL DAILY
Qty: 90 | Refills: 3 | Status: ACTIVE | COMMUNITY
Start: 2022-10-18 | End: 1900-01-01

## 2022-10-18 NOTE — PHYSICAL EXAM
[No Acute Distress] : no acute distress [Well Developed] : well developed [Well Nourished] : well nourished [Well-Appearing] : well-appearing [Normal Sclera/Conjunctiva] : normal sclera/conjunctiva [PERRL] : pupils equal round and reactive to light [EOMI] : extraocular movements intact [Normal Outer Ear/Nose] : the outer ears and nose were normal in appearance [Normal Oropharynx] : the oropharynx was normal [No JVD] : no jugular venous distention [No Lymphadenopathy] : no lymphadenopathy [Supple] : supple [Thyroid Normal, No Nodules] : the thyroid was normal and there were no nodules present [No Respiratory Distress] : no respiratory distress  [No Accessory Muscle Use] : no accessory muscle use [Clear to Auscultation] : lungs were clear to auscultation bilaterally [Normal Rate] : normal rate  [Regular Rhythm] : with a regular rhythm [Normal S1, S2] : normal S1 and S2 [No Murmur] : no murmur heard [No Carotid Bruits] : no carotid bruits [No Abdominal Bruit] : a ~M bruit was not heard ~T in the abdomen [No Varicosities] : no varicosities [Pedal Pulses Present] : the pedal pulses are present [No Edema] : there was no peripheral edema [No Palpable Aorta] : no palpable aorta [No Extremity Clubbing/Cyanosis] : no extremity clubbing/cyanosis [Soft] : abdomen soft [Non Tender] : non-tender [Non-distended] : non-distended [No Masses] : no abdominal mass palpated [No HSM] : no HSM [Normal Bowel Sounds] : normal bowel sounds [Normal Posterior Cervical Nodes] : no posterior cervical lymphadenopathy [Normal Anterior Cervical Nodes] : no anterior cervical lymphadenopathy [No CVA Tenderness] : no CVA  tenderness [No Spinal Tenderness] : no spinal tenderness [No Rash] : no rash [Coordination Grossly Intact] : coordination grossly intact [No Focal Deficits] : no focal deficits [Normal Gait] : normal gait [Deep Tendon Reflexes (DTR)] : deep tendon reflexes were 2+ and symmetric [Normal Affect] : the affect was normal [Normal Insight/Judgement] : insight and judgment were intact [de-identified] : right hemiparesis right upper greater than right lower

## 2022-10-18 NOTE — ASSESSMENT
[FreeTextEntry1] : stroke unchanged continue with thick liquid\par bp stable\par labs last visit ok\par continue asa \par sent thick water and thick powder to help thicken food\par follow up 6months cpe\par flu vaccine given

## 2022-10-18 NOTE — HISTORY OF PRESENT ILLNESS
[FreeTextEntry1] : follow up  [de-identified] : follow up stroke\par has swallowing issues, takes everything thick\par no fever no cough\par not verbal but follows instructions\par has no chest pain sob nvd

## 2022-10-20 RX ORDER — STARCH
POWDER (GRAM) ORAL
Qty: 2 | Refills: 3 | Status: ACTIVE | COMMUNITY
Start: 2022-10-18 | End: 1900-01-01

## 2023-01-04 RX ORDER — ASPIRIN/CALCIUM CARB/MAGNESIUM 324 MG
1 TABLET ORAL
Qty: 0 | Refills: 0 | DISCHARGE

## 2023-01-04 RX ORDER — LOSARTAN POTASSIUM 100 MG/1
100 TABLET, FILM COATED ORAL
Qty: 0 | Refills: 0 | DISCHARGE

## 2023-01-04 RX ORDER — BACLOFEN 100 %
0 POWDER (GRAM) MISCELLANEOUS
Qty: 0 | Refills: 0 | DISCHARGE

## 2023-04-26 ENCOUNTER — LABORATORY RESULT (OUTPATIENT)
Age: 88
End: 2023-04-26

## 2023-04-26 ENCOUNTER — APPOINTMENT (OUTPATIENT)
Dept: INTERNAL MEDICINE | Facility: CLINIC | Age: 88
End: 2023-04-26
Payer: MEDICAID

## 2023-04-26 ENCOUNTER — NON-APPOINTMENT (OUTPATIENT)
Age: 88
End: 2023-04-26

## 2023-04-26 VITALS — RESPIRATION RATE: 16 BRPM | HEART RATE: 72 BPM | DIASTOLIC BLOOD PRESSURE: 72 MMHG | SYSTOLIC BLOOD PRESSURE: 132 MMHG

## 2023-04-26 DIAGNOSIS — Z00.00 ENCOUNTER FOR GENERAL ADULT MEDICAL EXAMINATION W/OUT ABNORMAL FINDINGS: ICD-10-CM

## 2023-04-26 DIAGNOSIS — R47.01 APHASIA: ICD-10-CM

## 2023-04-26 PROCEDURE — 99397 PER PM REEVAL EST PAT 65+ YR: CPT | Mod: 25

## 2023-04-26 PROCEDURE — 93000 ELECTROCARDIOGRAM COMPLETE: CPT

## 2023-04-26 RX ORDER — TERCONAZOLE 8 MG/G
0.8 CREAM VAGINAL
Qty: 1 | Refills: 1 | Status: DISCONTINUED | COMMUNITY
Start: 2020-10-28 | End: 2023-04-26

## 2023-04-26 RX ORDER — SILVER SULFADIAZINE 10 MG/G
1 CREAM TOPICAL TWICE DAILY
Qty: 1 | Refills: 3 | Status: ACTIVE | COMMUNITY
Start: 2021-01-28 | End: 1900-01-01

## 2023-04-26 RX ORDER — BACLOFEN 5 MG/1
5 TABLET ORAL
Qty: 30 | Refills: 5 | Status: DISCONTINUED | COMMUNITY
Start: 2018-12-12 | End: 2023-04-26

## 2023-04-26 RX ORDER — FLUOXETINE HYDROCHLORIDE 20 MG/1
20 CAPSULE ORAL
Qty: 90 | Refills: 2 | Status: DISCONTINUED | COMMUNITY
Start: 2018-12-12 | End: 2023-04-26

## 2023-04-26 NOTE — ASSESSMENT
[FreeTextEntry1] : cva right hemiplegia\par needs pedro lift\par continue meds \par check labs\par dysphagia persistent\par follow up October

## 2023-04-26 NOTE — PHYSICAL EXAM
[No Acute Distress] : no acute distress [Well Nourished] : well nourished [Well Developed] : well developed [Well-Appearing] : well-appearing [Normal Sclera/Conjunctiva] : normal sclera/conjunctiva [PERRL] : pupils equal round and reactive to light [EOMI] : extraocular movements intact [Normal Outer Ear/Nose] : the outer ears and nose were normal in appearance [Normal Oropharynx] : the oropharynx was normal [No JVD] : no jugular venous distention [No Lymphadenopathy] : no lymphadenopathy [Supple] : supple [Thyroid Normal, No Nodules] : the thyroid was normal and there were no nodules present [No Respiratory Distress] : no respiratory distress  [No Accessory Muscle Use] : no accessory muscle use [Clear to Auscultation] : lungs were clear to auscultation bilaterally [Normal Rate] : normal rate  [Regular Rhythm] : with a regular rhythm [Normal S1, S2] : normal S1 and S2 [No Murmur] : no murmur heard [No Carotid Bruits] : no carotid bruits [No Abdominal Bruit] : a ~M bruit was not heard ~T in the abdomen [No Varicosities] : no varicosities [Pedal Pulses Present] : the pedal pulses are present [No Edema] : there was no peripheral edema [No Palpable Aorta] : no palpable aorta [No Extremity Clubbing/Cyanosis] : no extremity clubbing/cyanosis [Soft] : abdomen soft [Non Tender] : non-tender [Non-distended] : non-distended [No Masses] : no abdominal mass palpated [No HSM] : no HSM [Normal Bowel Sounds] : normal bowel sounds [Normal Posterior Cervical Nodes] : no posterior cervical lymphadenopathy [Normal Anterior Cervical Nodes] : no anterior cervical lymphadenopathy [No CVA Tenderness] : no CVA  tenderness [No Spinal Tenderness] : no spinal tenderness [No Joint Swelling] : no joint swelling [Grossly Normal Strength/Tone] : grossly normal strength/tone [No Rash] : no rash [Coordination Grossly Intact] : coordination grossly intact [Normal Affect] : the affect was normal [Normal Insight/Judgement] : insight and judgment were intact [de-identified] : right hemiplegia

## 2023-04-26 NOTE — HEALTH RISK ASSESSMENT
[No] : No [No falls in past year] : Patient reported no falls in the past year [0] : 2) Feeling down, depressed, or hopeless: Not at all (0) [PHQ-2 Negative - No further assessment needed] : PHQ-2 Negative - No further assessment needed [[id number]] : 0 [HIV test declined] : HIV test declined [Hepatitis C test declined] : Hepatitis C test declined [Change in mental status noted] : No change in mental status noted [Language] : difficulty with language [Behavior] : difficulty with behavior [Learning/Retaining New Information] : difficulty learning/retaining new information [Reasoning] : difficulty with reasoning [Handling Complex Tasks] : difficulty handling complex tasks [None] : None [With Significant Other] : lives with significant other [With Family] : lives with family [High School] : high school [] :  [Never] : Never

## 2023-04-26 NOTE — HISTORY OF PRESENT ILLNESS
[FreeTextEntry1] : s/p; cva [de-identified] : massive cva right hemiplegia\par unable to swallow is on mechanical soft diet\par has been well\par and is wheelchair\par daughter does the heavy lifting may need pedro lift to get in and out of bed

## 2023-04-27 ENCOUNTER — NON-APPOINTMENT (OUTPATIENT)
Age: 88
End: 2023-04-27

## 2023-04-27 PROBLEM — Z78.9 OTHER SPECIFIED HEALTH STATUS: Chronic | Status: ACTIVE | Noted: 2020-02-22

## 2023-04-27 LAB
ALBUMIN SERPL ELPH-MCNC: 4.1 G/DL
ALP BLD-CCNC: 108 U/L
ALT SERPL-CCNC: 21 U/L
ANION GAP SERPL CALC-SCNC: 12 MMOL/L
AST SERPL-CCNC: 28 U/L
BASOPHILS # BLD AUTO: 0.02 K/UL
BASOPHILS NFR BLD AUTO: 0.5 %
BILIRUB SERPL-MCNC: 0.2 MG/DL
BUN SERPL-MCNC: 22 MG/DL
CALCIUM SERPL-MCNC: 10.4 MG/DL
CHLORIDE SERPL-SCNC: 107 MMOL/L
CHOLEST SERPL-MCNC: 177 MG/DL
CO2 SERPL-SCNC: 26 MMOL/L
CREAT SERPL-MCNC: 0.77 MG/DL
EGFR: 73 ML/MIN/1.73M2
EOSINOPHIL # BLD AUTO: 0.13 K/UL
EOSINOPHIL NFR BLD AUTO: 3.1 %
ESTIMATED AVERAGE GLUCOSE: 105 MG/DL
GLUCOSE SERPL-MCNC: 95 MG/DL
HBA1C MFR BLD HPLC: 5.3 %
HCT VFR BLD CALC: 34.3 %
HDLC SERPL-MCNC: 65 MG/DL
HGB BLD-MCNC: 10.5 G/DL
IMM GRANULOCYTES NFR BLD AUTO: 0.2 %
LDLC SERPL CALC-MCNC: 97 MG/DL
LYMPHOCYTES # BLD AUTO: 1.3 K/UL
LYMPHOCYTES NFR BLD AUTO: 30.9 %
MAN DIFF?: NORMAL
MCHC RBC-ENTMCNC: 30.6 GM/DL
MCHC RBC-ENTMCNC: 32.4 PG
MCV RBC AUTO: 105.9 FL
MONOCYTES # BLD AUTO: 0.36 K/UL
MONOCYTES NFR BLD AUTO: 8.6 %
NEUTROPHILS # BLD AUTO: 2.39 K/UL
NEUTROPHILS NFR BLD AUTO: 56.7 %
NONHDLC SERPL-MCNC: 112 MG/DL
PLATELET # BLD AUTO: 170 K/UL
POTASSIUM SERPL-SCNC: 4.4 MMOL/L
PROT SERPL-MCNC: 6.7 G/DL
RBC # BLD: 3.24 M/UL
RBC # FLD: 13.5 %
SODIUM SERPL-SCNC: 145 MMOL/L
T4 FREE SERPL-MCNC: 0.9 NG/DL
TRIGL SERPL-MCNC: 75 MG/DL
TSH SERPL-ACNC: 7.63 UIU/ML
WBC # FLD AUTO: 4.21 K/UL

## 2023-05-08 NOTE — H&P ADULT - HISTORY OF PRESENT ILLNESS
2cc/1% lidocaine 85 yo right-handed Estonian speaking woman who presents to [facility] as a transfer from [facility] for new and acute right-sided weakness and language disturbance (LWK 5-530pm, 11/2/18). ROS also revealed non-verbal, unresponsiveness. Otherwise limited due to patient's current cognitive state. Pertinent hx includes HTN and "heart condition" (unspecified w/ family at bedside). Patient's initial NIHSS score worsened to 28 upon arrival to [facility], was given tPA (1859pm, 11/2/18) and subsequently transferred for possible endovascular intervention. Patient's current NIHSS 22, pre-MRS 0, ASPECT > 6. 87 yo right-handed Thai speaking woman who presents to [facility] as a transfer from [facility] for new and acute right-sided weakness and language disturbance (LWK 5-530pm, 11/2/18). ROS also revealed non-verbal, unresponsiveness. Otherwise limited due to patient's current cognitive state. Pertinent hx includes HTN and "heart condition" (unspecified w/ family at bedside). Patient's initial NIHSS score worsened to 28 upon arrival to [facility], was given tPA (1859pm, 11/2/18) and subsequently transferred for possible endovascular intervention. Patient's current NIHSS 22, pre-MRS 0, no M1 occlusion.

## 2023-05-15 NOTE — PATIENT PROFILE ADULT - LIVES WITH
adult child(alanna) Spironolactone Pregnancy And Lactation Text: This medication can cause feminization of the male fetus and should be avoided during pregnancy. The active metabolite is also found in breast milk.

## 2023-06-07 DIAGNOSIS — M25.512 PAIN IN LEFT SHOULDER: ICD-10-CM

## 2023-06-07 RX ORDER — METHYLPREDNISOLONE 4 MG/1
4 TABLET ORAL
Qty: 1 | Refills: 1 | Status: ACTIVE | COMMUNITY
Start: 2022-07-27 | End: 1900-01-01

## 2023-08-07 RX ORDER — LOSARTAN POTASSIUM 100 MG/1
100 TABLET, FILM COATED ORAL
Qty: 90 | Refills: 1 | Status: ACTIVE | COMMUNITY
Start: 2018-12-12 | End: 1900-01-01

## 2023-09-13 ENCOUNTER — APPOINTMENT (OUTPATIENT)
Dept: INTERNAL MEDICINE | Facility: CLINIC | Age: 88
End: 2023-09-13
Payer: MEDICAID

## 2023-09-13 DIAGNOSIS — Z23 ENCOUNTER FOR IMMUNIZATION: ICD-10-CM

## 2023-09-13 PROCEDURE — 99214 OFFICE O/P EST MOD 30 MIN: CPT

## 2023-09-13 RX ORDER — STARCH
POWDER (GRAM) ORAL
Qty: 2 | Refills: 3 | Status: ACTIVE | COMMUNITY
Start: 2022-11-04 | End: 1900-01-01

## 2023-09-14 LAB
ALBUMIN SERPL ELPH-MCNC: 4.3 G/DL
ALP BLD-CCNC: 84 U/L
ALT SERPL-CCNC: 23 U/L
ANION GAP SERPL CALC-SCNC: 11 MMOL/L
AST SERPL-CCNC: 26 U/L
BILIRUB SERPL-MCNC: 0.2 MG/DL
BUN SERPL-MCNC: 24 MG/DL
CALCIUM SERPL-MCNC: 10.3 MG/DL
CHLORIDE SERPL-SCNC: 106 MMOL/L
CHOLEST SERPL-MCNC: 176 MG/DL
CO2 SERPL-SCNC: 25 MMOL/L
CREAT SERPL-MCNC: 0.92 MG/DL
EGFR: 59 ML/MIN/1.73M2
GLUCOSE SERPL-MCNC: 101 MG/DL
HDLC SERPL-MCNC: 59 MG/DL
LDLC SERPL CALC-MCNC: 104 MG/DL
NONHDLC SERPL-MCNC: 117 MG/DL
POTASSIUM SERPL-SCNC: 4.3 MMOL/L
PROT SERPL-MCNC: 6.5 G/DL
SODIUM SERPL-SCNC: 141 MMOL/L
T4 FREE SERPL-MCNC: 1.2 NG/DL
TRIGL SERPL-MCNC: 70 MG/DL
TSH SERPL-ACNC: 2.52 UIU/ML

## 2024-01-11 RX ORDER — TRAZODONE HYDROCHLORIDE 100 MG/1
100 TABLET ORAL
Qty: 90 | Refills: 3 | Status: ACTIVE | COMMUNITY
Start: 2019-11-12 | End: 1900-01-01

## 2024-02-03 NOTE — PROGRESS NOTE ADULT - SUBJECTIVE AND OBJECTIVE BOX
HPI    Pt is a 87 yo F admitted to acute rehab for left ICA CVA  Pt was seen and examined in bed     feels better no co    no chest pain no sob     ros all others are negative     ICU Vital Signs Last 24 Hrs  T(C): 36.4 (25 Nov 2018 07:29), Max: 36.5 (24 Nov 2018 20:31)  T(F): 97.5 (25 Nov 2018 07:29), Max: 97.7 (24 Nov 2018 20:31)  HR: 75 (25 Nov 2018 07:29) (68 - 79)  BP: 113/71 (25 Nov 2018 07:29) (113/71 - 135/69)  BP(mean): --  ABP: --  ABP(mean): --  RR: 15 (25 Nov 2018 07:29) (15 - 15)  SpO2: 98% (25 Nov 2018 07:29) (96% - 98%)    MEDICATIONS  (STANDING):  aspirin  chewable 81 milliGRAM(s) Oral daily  atorvastatin 40 milliGRAM(s) Oral at bedtime  bisacodyl 5 milliGRAM(s) Oral at bedtime  ciprofloxacin     Tablet 250 milliGRAM(s) Oral every 12 hours  docusate sodium 100 milliGRAM(s) Oral three times a day  enoxaparin Injectable 40 milliGRAM(s) SubCutaneous daily  FLUoxetine 20 milliGRAM(s) Oral daily  losartan 100 milliGRAM(s) Oral daily  nystatin    Suspension 134928 Unit(s) Oral three times a day  polyethylene glycol 3350 17 Gram(s) Oral daily  saccharomyces boulardii 250 milliGRAM(s) Oral two times a day  senna 2 Tablet(s) Oral at bedtime    MEDICATIONS  (PRN):  acetaminophen   Tablet .. 650 milliGRAM(s) Oral every 6 hours PRN Temp greater or equal to 38C (100.4F), Mild Pain (1 - 3)  bisacodyl Suppository 10 milliGRAM(s) Rectal daily PRN Constipation  phenol 1.4% (CHLORASEPTIC) Oral Spray 1 Spray(s) Topical every 4 hours PRN sore throat pain                          11.5   4.8   )-----------( 259      ( 23 Nov 2018 06:15 )             34.6     11-23    140  |  105  |  16  ----------------------------<  101<H>  4.1   |  25  |  0.76    Ca    9.7      23 Nov 2018 06:15    TPro  6.4  /  Alb  3.4  /  TBili  0.4  /  DBili  x   /  AST  60<H>  /  ALT  83<H>  /  AlkPhos  77  11-23        LABS: All Labs Reviewed:                        11.6   4.9   )-----------( 250      ( 19 Nov 2018 06:15 )             34.5     11-19    138  |  106  |  21  ----------------------------<  110<H>  4.0   |  27  |  0.82    Ca    9.7      19 Nov 2018 06:15    TPro  6.4  /  Alb  3.2<L>  /  TBili  0.4  /  DBili  x   /  AST  42<H>  /  ALT  51<H>  /  AlkPhos  80  11-19          Blood Culture:     RADIOLOGY/EKG:    DVT PPX:    ADVANCED DIRECTIVE:    DISPOSITION: Ambulatory

## 2024-03-05 RX ORDER — GABAPENTIN 100 MG/1
100 CAPSULE ORAL
Qty: 90 | Refills: 3 | Status: ACTIVE | COMMUNITY
Start: 2020-03-04 | End: 1900-01-01

## 2024-03-15 ENCOUNTER — APPOINTMENT (OUTPATIENT)
Dept: INTERNAL MEDICINE | Facility: CLINIC | Age: 89
End: 2024-03-15
Payer: MEDICAID

## 2024-03-15 ENCOUNTER — NON-APPOINTMENT (OUTPATIENT)
Age: 89
End: 2024-03-15

## 2024-03-15 ENCOUNTER — APPOINTMENT (OUTPATIENT)
Dept: INTERNAL MEDICINE | Facility: CLINIC | Age: 89
End: 2024-03-15

## 2024-03-15 DIAGNOSIS — I63.9 CEREBRAL INFARCTION, UNSPECIFIED: ICD-10-CM

## 2024-03-15 DIAGNOSIS — G62.9 POLYNEUROPATHY, UNSPECIFIED: ICD-10-CM

## 2024-03-15 DIAGNOSIS — I69.359 HEMIPLEGIA AND HEMIPARESIS FOLLOWING CEREBRAL INFARCTION AFFECTING UNSPECIFIED SIDE: ICD-10-CM

## 2024-03-15 DIAGNOSIS — I10 ESSENTIAL (PRIMARY) HYPERTENSION: ICD-10-CM

## 2024-03-15 DIAGNOSIS — E03.9 HYPOTHYROIDISM, UNSPECIFIED: ICD-10-CM

## 2024-03-15 PROCEDURE — 99214 OFFICE O/P EST MOD 30 MIN: CPT | Mod: 95

## 2024-03-15 RX ORDER — POLYETHYLENE GLYCOL 3350 17 G/17G
17 POWDER, FOR SOLUTION ORAL
Qty: 1 | Refills: 4 | Status: DISCONTINUED | COMMUNITY
Start: 2019-04-11 | End: 2024-03-15

## 2024-03-15 RX ORDER — LEVOTHYROXINE SODIUM 0.05 MG/1
50 TABLET ORAL DAILY
Qty: 90 | Refills: 2 | Status: DISCONTINUED | COMMUNITY
Start: 2023-04-27 | End: 2024-03-15

## 2024-03-15 NOTE — HISTORY OF PRESENT ILLNESS
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  BM are regular and good urinary output. Drinks papaya juice daily.  She follows a soft-pureed diet.  Medications reviewed with daughter, not taking levoT   Patient feels well and denies any acute complaints.

## 2024-03-15 NOTE — ASSESSMENT
[FreeTextEntry1] : Bernadette is a 89 yo F w pmhX HTN, HLD, hypothyroidism, CVA, hemiplegia  Daughter- Sydney Saldana assisting in history taking as patient has dysarthria 2/2 CVA 6 years ago.  BM are regular and good urinary output. Drinks papaya juice daily.  She follows a soft-pureed diet.  Medications reviewed with daughter, not taking levoT   Patient feels well and denies any acute complaints.   Hypothyroidism  Will arrange home blood draw  fuf TFTs  remains off levoT   HTN Advised low salt diet Diet and exercise discussed. 20 % of weight loss associated to better bp control Decrease alcohol intake Patient has pending office appt for bp check  remains on losartan   HLD/Hx CVA  Educated eating whole grain foods rich in soluble fiber such as oats and Omega 3 rich fish such as salmon, tout, sardines and bean based meals such as kidney beans, chickpeas and lentils. Small protions of nuts. Limit sugars and alcohol. Remains on ASA   Insomnia  stable on trazadone 100 mg qd   Neuropathy  Stable on gabapentin   f/u Dr. Vallejo

## 2024-03-21 DIAGNOSIS — R32 UNSPECIFIED URINARY INCONTINENCE: ICD-10-CM

## 2024-05-06 RX ORDER — KRILL/OM-3/DHA/EPA/PHOSPHO/AST 1000-230MG
81 CAPSULE ORAL
Qty: 90 | Refills: 3 | Status: ACTIVE | COMMUNITY
Start: 2020-09-16 | End: 1900-01-01

## 2024-05-29 NOTE — ED ADULT NURSE NOTE - [facility] TRG CHIEF CMPLNT
Health Maintenance       Diabetes Foot Exam (Yearly)  Never done    Shingles Vaccine (1 of 2)  Never done    DTaP/Tdap/Td Vaccine (1 - Tdap)  Overdue since 9/22/2009    Diabetes Eye Exam (Yearly)  Overdue since 9/24/2020    Osteoporosis Screening (Once)  Overdue since 3/29/2022    COVID-19 Vaccine (6 - 2023-24 season)  Overdue since 9/1/2023    Colorectal Cancer Risk - Colonoscopy (Every 3 Years)  Ordered on 2/21/2024    Traditional Medicare- Medicare Wellness Visit (Yearly)  Due soon on 7/1/2024           Following review of the above:  Patient is not proceeding with:All the above.    Note: Refer to final orders and clinician documentation.       syncope

## 2024-07-03 ENCOUNTER — NON-APPOINTMENT (OUTPATIENT)
Age: 89
End: 2024-07-03

## 2024-07-03 ENCOUNTER — LABORATORY RESULT (OUTPATIENT)
Age: 89
End: 2024-07-03

## 2024-07-03 ENCOUNTER — APPOINTMENT (OUTPATIENT)
Dept: INTERNAL MEDICINE | Facility: CLINIC | Age: 89
End: 2024-07-03
Payer: MEDICAID

## 2024-07-03 VITALS
BODY MASS INDEX: 22.18 KG/M2 | SYSTOLIC BLOOD PRESSURE: 138 MMHG | HEIGHT: 59 IN | HEART RATE: 78 BPM | WEIGHT: 110 LBS | DIASTOLIC BLOOD PRESSURE: 82 MMHG | RESPIRATION RATE: 12 BRPM

## 2024-07-03 DIAGNOSIS — E03.9 HYPOTHYROIDISM, UNSPECIFIED: ICD-10-CM

## 2024-07-03 DIAGNOSIS — I69.359 HEMIPLEGIA AND HEMIPARESIS FOLLOWING CEREBRAL INFARCTION AFFECTING UNSPECIFIED SIDE: ICD-10-CM

## 2024-07-03 DIAGNOSIS — R60.9 EDEMA, UNSPECIFIED: ICD-10-CM

## 2024-07-03 DIAGNOSIS — Z00.00 ENCOUNTER FOR GENERAL ADULT MEDICAL EXAMINATION W/OUT ABNORMAL FINDINGS: ICD-10-CM

## 2024-07-03 DIAGNOSIS — I10 ESSENTIAL (PRIMARY) HYPERTENSION: ICD-10-CM

## 2024-07-03 DIAGNOSIS — R47.01 APHASIA: ICD-10-CM

## 2024-07-03 PROCEDURE — 99213 OFFICE O/P EST LOW 20 MIN: CPT | Mod: 25

## 2024-07-03 PROCEDURE — 93000 ELECTROCARDIOGRAM COMPLETE: CPT | Mod: 59

## 2024-07-03 PROCEDURE — 99397 PER PM REEVAL EST PAT 65+ YR: CPT | Mod: 25

## 2024-07-03 PROCEDURE — G0444 DEPRESSION SCREEN ANNUAL: CPT | Mod: 59

## 2024-07-03 RX ORDER — FUROSEMIDE 20 MG/1
20 TABLET ORAL
Qty: 90 | Refills: 1 | Status: ACTIVE | COMMUNITY
Start: 2024-07-03 | End: 1900-01-01

## 2024-07-04 LAB
ALBUMIN SERPL ELPH-MCNC: 4.1 G/DL
ALP BLD-CCNC: 91 U/L
ALT SERPL-CCNC: 24 U/L
ANION GAP SERPL CALC-SCNC: 11 MMOL/L
AST SERPL-CCNC: 26 U/L
BILIRUB SERPL-MCNC: 0.3 MG/DL
BUN SERPL-MCNC: 23 MG/DL
CALCIUM SERPL-MCNC: 10.1 MG/DL
CHLORIDE SERPL-SCNC: 108 MMOL/L
CHOLEST SERPL-MCNC: 173 MG/DL
CO2 SERPL-SCNC: 25 MMOL/L
CREAT SERPL-MCNC: 0.93 MG/DL
EGFR: 58 ML/MIN/1.73M2
ESTIMATED AVERAGE GLUCOSE: 111 MG/DL
GLUCOSE SERPL-MCNC: 117 MG/DL
HBA1C MFR BLD HPLC: 5.5 %
HDLC SERPL-MCNC: 59 MG/DL
LDLC SERPL CALC-MCNC: 97 MG/DL
NONHDLC SERPL-MCNC: 113 MG/DL
POTASSIUM SERPL-SCNC: 4.3 MMOL/L
PROT SERPL-MCNC: 6.5 G/DL
SODIUM SERPL-SCNC: 144 MMOL/L
T4 FREE SERPL-MCNC: 0.9 NG/DL
TRIGL SERPL-MCNC: 89 MG/DL
TSH SERPL-ACNC: 7.41 UIU/ML

## 2024-07-05 LAB
BASOPHILS # BLD AUTO: 0.02 K/UL
BASOPHILS NFR BLD AUTO: 0.4 %
EOSINOPHIL # BLD AUTO: 0.13 K/UL
EOSINOPHIL NFR BLD AUTO: 2.8 %
HCT VFR BLD CALC: 36.9 %
HGB BLD-MCNC: 11.7 G/DL
IMM GRANULOCYTES NFR BLD AUTO: 0.6 %
LYMPHOCYTES # BLD AUTO: 1.53 K/UL
LYMPHOCYTES NFR BLD AUTO: 32.5 %
MAN DIFF?: NORMAL
MCHC RBC-ENTMCNC: 31.7 GM/DL
MCHC RBC-ENTMCNC: 33.4 PG
MCV RBC AUTO: 105.4 FL
MONOCYTES # BLD AUTO: 0.48 K/UL
MONOCYTES NFR BLD AUTO: 10.2 %
NEUTROPHILS # BLD AUTO: 2.52 K/UL
NEUTROPHILS NFR BLD AUTO: 53.5 %
PLATELET # BLD AUTO: 138 K/UL
RBC # BLD: 3.5 M/UL
RBC # FLD: 14 %
WBC # FLD AUTO: 4.71 K/UL

## 2024-07-08 ENCOUNTER — APPOINTMENT (OUTPATIENT)
Dept: ULTRASOUND IMAGING | Facility: CLINIC | Age: 89
End: 2024-07-08
Payer: MEDICAID

## 2024-07-08 ENCOUNTER — OUTPATIENT (OUTPATIENT)
Dept: OUTPATIENT SERVICES | Facility: HOSPITAL | Age: 89
LOS: 1 days | End: 2024-07-08
Payer: MEDICAID

## 2024-07-08 DIAGNOSIS — R60.9 EDEMA, UNSPECIFIED: ICD-10-CM

## 2024-07-08 DIAGNOSIS — Z90.49 ACQUIRED ABSENCE OF OTHER SPECIFIED PARTS OF DIGESTIVE TRACT: Chronic | ICD-10-CM

## 2024-07-08 PROCEDURE — 93971 EXTREMITY STUDY: CPT

## 2024-07-08 PROCEDURE — 93971 EXTREMITY STUDY: CPT | Mod: 26,RT

## 2024-12-26 ENCOUNTER — RX RENEWAL (OUTPATIENT)
Age: 88
End: 2024-12-26

## 2025-01-08 ENCOUNTER — APPOINTMENT (OUTPATIENT)
Dept: INTERNAL MEDICINE | Facility: CLINIC | Age: 89
End: 2025-01-08

## 2025-03-24 ENCOUNTER — APPOINTMENT (OUTPATIENT)
Dept: INTERNAL MEDICINE | Facility: CLINIC | Age: 89
End: 2025-03-24

## 2025-03-28 ENCOUNTER — APPOINTMENT (OUTPATIENT)
Dept: INTERNAL MEDICINE | Facility: CLINIC | Age: 89
End: 2025-03-28
Payer: MEDICAID

## 2025-03-28 VITALS — SYSTOLIC BLOOD PRESSURE: 132 MMHG | HEART RATE: 82 BPM | DIASTOLIC BLOOD PRESSURE: 60 MMHG | RESPIRATION RATE: 12 BRPM

## 2025-03-28 DIAGNOSIS — E03.9 HYPOTHYROIDISM, UNSPECIFIED: ICD-10-CM

## 2025-03-28 DIAGNOSIS — I69.359 HEMIPLEGIA AND HEMIPARESIS FOLLOWING CEREBRAL INFARCTION AFFECTING UNSPECIFIED SIDE: ICD-10-CM

## 2025-03-28 DIAGNOSIS — L89.310 PRESSURE ULCER OF RIGHT BUTTOCK, UNSTAGEABLE: ICD-10-CM

## 2025-03-28 DIAGNOSIS — I10 ESSENTIAL (PRIMARY) HYPERTENSION: ICD-10-CM

## 2025-03-28 DIAGNOSIS — R47.01 APHASIA: ICD-10-CM

## 2025-03-28 PROCEDURE — 99214 OFFICE O/P EST MOD 30 MIN: CPT | Mod: 25

## 2025-03-29 LAB
ALBUMIN SERPL ELPH-MCNC: 4.1 G/DL
ALP BLD-CCNC: 130 U/L
ALT SERPL-CCNC: 94 U/L
ANION GAP SERPL CALC-SCNC: 14 MMOL/L
AST SERPL-CCNC: 54 U/L
BASOPHILS # BLD AUTO: 0.02 K/UL
BASOPHILS NFR BLD AUTO: 0.4 %
BILIRUB SERPL-MCNC: 0.3 MG/DL
BUN SERPL-MCNC: 26 MG/DL
CALCIUM SERPL-MCNC: 10.8 MG/DL
CHLORIDE SERPL-SCNC: 117 MMOL/L
CO2 SERPL-SCNC: 24 MMOL/L
CREAT SERPL-MCNC: 0.94 MG/DL
EGFRCR SERPLBLD CKD-EPI 2021: 57 ML/MIN/1.73M2
EOSINOPHIL # BLD AUTO: 0.04 K/UL
EOSINOPHIL NFR BLD AUTO: 0.7 %
ESTIMATED AVERAGE GLUCOSE: 111 MG/DL
GLUCOSE SERPL-MCNC: 93 MG/DL
HBA1C MFR BLD HPLC: 5.5 %
HCT VFR BLD CALC: 39.6 %
HGB BLD-MCNC: 12.6 G/DL
IMM GRANULOCYTES NFR BLD AUTO: 0.2 %
LYMPHOCYTES # BLD AUTO: 1.2 K/UL
LYMPHOCYTES NFR BLD AUTO: 22.1 %
MAN DIFF?: NORMAL
MCHC RBC-ENTMCNC: 31.8 G/DL
MCHC RBC-ENTMCNC: 34.1 PG
MCV RBC AUTO: 107 FL
MONOCYTES # BLD AUTO: 0.51 K/UL
MONOCYTES NFR BLD AUTO: 9.4 %
NEUTROPHILS # BLD AUTO: 3.65 K/UL
NEUTROPHILS NFR BLD AUTO: 67.2 %
PLATELET # BLD AUTO: 166 K/UL
POTASSIUM SERPL-SCNC: 4.1 MMOL/L
PROT SERPL-MCNC: 6.8 G/DL
RBC # BLD: 3.7 M/UL
RBC # FLD: 13.6 %
SODIUM SERPL-SCNC: 154 MMOL/L
T3FREE SERPL-MCNC: 2.35 PG/ML
T4 FREE SERPL-MCNC: 1.1 NG/DL
TSH SERPL-ACNC: 3.29 UIU/ML
WBC # FLD AUTO: 5.43 K/UL

## 2025-06-19 ENCOUNTER — TRANSCRIPTION ENCOUNTER (OUTPATIENT)
Age: 89
End: 2025-06-19

## 2025-06-25 ENCOUNTER — APPOINTMENT (OUTPATIENT)
Dept: INTERNAL MEDICINE | Facility: CLINIC | Age: 89
End: 2025-06-25

## 2025-07-11 ENCOUNTER — APPOINTMENT (OUTPATIENT)
Dept: INTERNAL MEDICINE | Facility: CLINIC | Age: 89
End: 2025-07-11